# Patient Record
Sex: FEMALE | Race: WHITE | Employment: PART TIME | URBAN - METROPOLITAN AREA
[De-identification: names, ages, dates, MRNs, and addresses within clinical notes are randomized per-mention and may not be internally consistent; named-entity substitution may affect disease eponyms.]

---

## 2017-11-01 PROBLEM — E04.9 THYROID ENLARGEMENT: Status: ACTIVE | Noted: 2017-11-01

## 2018-08-23 ENCOUNTER — TELEPHONE (OUTPATIENT)
Dept: INTERNAL MEDICINE | Age: 23
End: 2018-08-23
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
## 2018-10-02 ENCOUNTER — HOSPITAL ENCOUNTER (EMERGENCY)
Age: 23
Discharge: HOME | End: 2018-10-02

## 2018-10-02 VITALS
DIASTOLIC BLOOD PRESSURE: 75 MMHG | TEMPERATURE: 97.2 F | BODY MASS INDEX: 39.93 KG/M2 | HEIGHT: 62 IN | WEIGHT: 217 LBS | RESPIRATION RATE: 16 BRPM | HEART RATE: 91 BPM | OXYGEN SATURATION: 98 % | SYSTOLIC BLOOD PRESSURE: 145 MMHG

## 2018-10-02 DIAGNOSIS — T14.8XXA MUSCLE STRAIN: ICD-10-CM

## 2018-10-02 DIAGNOSIS — M79.604 RIGHT LEG PAIN: Primary | ICD-10-CM

## 2018-10-02 PROCEDURE — 6370000000 HC RX 637 (ALT 250 FOR IP): Performed by: NURSE PRACTITIONER

## 2018-10-02 PROCEDURE — 99282 EMERGENCY DEPT VISIT SF MDM: CPT

## 2018-10-02 RX ORDER — CYCLOBENZAPRINE HCL 10 MG
10 TABLET ORAL ONCE
Status: COMPLETED | OUTPATIENT
Start: 2018-10-02 | End: 2018-10-02

## 2018-10-02 RX ORDER — ORPHENADRINE CITRATE 100 MG/1
100 TABLET, EXTENDED RELEASE ORAL 2 TIMES DAILY
Qty: 20 TABLET | Refills: 0 | Status: SHIPPED | OUTPATIENT
Start: 2018-10-02 | End: 2018-10-12

## 2018-10-02 RX ORDER — NAPROXEN 500 MG/1
500 TABLET ORAL 2 TIMES DAILY PRN
Qty: 28 TABLET | Refills: 0 | Status: SHIPPED | OUTPATIENT
Start: 2018-10-02 | End: 2019-01-06

## 2018-10-02 RX ORDER — OXYCODONE HYDROCHLORIDE AND ACETAMINOPHEN 5; 325 MG/1; MG/1
1 TABLET ORAL ONCE
Status: COMPLETED | OUTPATIENT
Start: 2018-10-02 | End: 2018-10-02

## 2018-10-02 RX ADMIN — OXYCODONE AND ACETAMINOPHEN 1 TABLET: 5; 325 TABLET ORAL at 19:27

## 2018-10-02 RX ADMIN — CYCLOBENZAPRINE HYDROCHLORIDE 10 MG: 10 TABLET, FILM COATED ORAL at 19:25

## 2018-10-02 ASSESSMENT — PAIN SCALES - GENERAL
PAINLEVEL_OUTOF10: 6
PAINLEVEL_OUTOF10: 5

## 2018-10-02 ASSESSMENT — PAIN DESCRIPTION - DESCRIPTORS: DESCRIPTORS: ACHING

## 2018-10-02 ASSESSMENT — PAIN DESCRIPTION - PAIN TYPE: TYPE: ACUTE PAIN

## 2018-10-02 ASSESSMENT — PAIN DESCRIPTION - LOCATION: LOCATION: LEG

## 2018-10-02 ASSESSMENT — PAIN DESCRIPTION - ORIENTATION: ORIENTATION: RIGHT

## 2018-10-09 ENCOUNTER — HOSPITAL ENCOUNTER (EMERGENCY)
Age: 23
Discharge: HOME | End: 2018-10-09

## 2018-10-09 VITALS
WEIGHT: 217 LBS | SYSTOLIC BLOOD PRESSURE: 159 MMHG | RESPIRATION RATE: 16 BRPM | BODY MASS INDEX: 39.93 KG/M2 | DIASTOLIC BLOOD PRESSURE: 97 MMHG | HEIGHT: 62 IN | HEART RATE: 122 BPM | OXYGEN SATURATION: 98 % | TEMPERATURE: 98.6 F

## 2018-10-09 DIAGNOSIS — J06.9 ACUTE UPPER RESPIRATORY INFECTION: ICD-10-CM

## 2018-10-09 DIAGNOSIS — R11.10 POST-TUSSIVE EMESIS: ICD-10-CM

## 2018-10-09 DIAGNOSIS — J02.9 ACUTE PHARYNGITIS, UNSPECIFIED ETIOLOGY: Primary | ICD-10-CM

## 2018-10-09 LAB — STREP GRP A PCR: NEGATIVE

## 2018-10-09 PROCEDURE — 6370000000 HC RX 637 (ALT 250 FOR IP): Performed by: NURSE PRACTITIONER

## 2018-10-09 PROCEDURE — 87880 STREP A ASSAY W/OPTIC: CPT

## 2018-10-09 PROCEDURE — 99282 EMERGENCY DEPT VISIT SF MDM: CPT

## 2018-10-09 PROCEDURE — 6360000002 HC RX W HCPCS: Performed by: NURSE PRACTITIONER

## 2018-10-09 RX ORDER — IBUPROFEN 800 MG/1
800 TABLET ORAL EVERY 6 HOURS PRN
Qty: 16 TABLET | Refills: 0 | Status: SHIPPED | OUTPATIENT
Start: 2018-10-09 | End: 2019-01-06

## 2018-10-09 RX ORDER — AZITHROMYCIN 250 MG/1
TABLET, FILM COATED ORAL
Qty: 1 PACKET | Refills: 0 | Status: SHIPPED | OUTPATIENT
Start: 2018-10-09 | End: 2018-10-19

## 2018-10-09 RX ORDER — ONDANSETRON 4 MG/1
4 TABLET, ORALLY DISINTEGRATING ORAL ONCE
Status: COMPLETED | OUTPATIENT
Start: 2018-10-09 | End: 2018-10-09

## 2018-10-09 RX ORDER — AZITHROMYCIN 250 MG/1
TABLET, FILM COATED ORAL
Qty: 1 PACKET | Refills: 0 | Status: SHIPPED | OUTPATIENT
Start: 2018-10-09 | End: 2018-10-09

## 2018-10-09 RX ORDER — IBUPROFEN 800 MG/1
800 TABLET ORAL EVERY 6 HOURS PRN
Qty: 16 TABLET | Refills: 0 | Status: SHIPPED | OUTPATIENT
Start: 2018-10-09 | End: 2018-10-09

## 2018-10-09 RX ORDER — ONDANSETRON 4 MG/1
4 TABLET, ORALLY DISINTEGRATING ORAL EVERY 8 HOURS PRN
Qty: 10 TABLET | Refills: 0 | Status: SHIPPED | OUTPATIENT
Start: 2018-10-09 | End: 2019-01-06

## 2018-10-09 RX ORDER — ONDANSETRON 4 MG/1
4 TABLET, ORALLY DISINTEGRATING ORAL EVERY 8 HOURS PRN
Qty: 10 TABLET | Refills: 0 | Status: SHIPPED | OUTPATIENT
Start: 2018-10-09 | End: 2018-10-09

## 2018-10-09 RX ADMIN — ONDANSETRON 4 MG: 4 TABLET, ORALLY DISINTEGRATING ORAL at 14:16

## 2018-10-09 RX ADMIN — IBUPROFEN 800 MG: 200 SUSPENSION ORAL at 14:15

## 2018-10-09 ASSESSMENT — PAIN DESCRIPTION - PROGRESSION: CLINICAL_PROGRESSION: GRADUALLY WORSENING

## 2018-10-09 ASSESSMENT — PAIN DESCRIPTION - LOCATION: LOCATION: THROAT

## 2018-10-09 ASSESSMENT — PAIN DESCRIPTION - PAIN TYPE: TYPE: ACUTE PAIN

## 2018-10-09 ASSESSMENT — PAIN SCALES - GENERAL: PAINLEVEL_OUTOF10: 9

## 2018-10-09 ASSESSMENT — PAIN DESCRIPTION - FREQUENCY: FREQUENCY: CONTINUOUS

## 2018-10-09 ASSESSMENT — PAIN DESCRIPTION - DESCRIPTORS: DESCRIPTORS: ACHING

## 2019-01-06 ENCOUNTER — HOSPITAL ENCOUNTER (EMERGENCY)
Age: 24
Discharge: HOME | End: 2019-01-06
Payer: COMMERCIAL

## 2019-01-06 VITALS
DIASTOLIC BLOOD PRESSURE: 71 MMHG | WEIGHT: 200 LBS | RESPIRATION RATE: 14 BRPM | HEART RATE: 95 BPM | OXYGEN SATURATION: 98 % | BODY MASS INDEX: 36.8 KG/M2 | SYSTOLIC BLOOD PRESSURE: 119 MMHG | TEMPERATURE: 98.2 F | HEIGHT: 62 IN

## 2019-01-06 DIAGNOSIS — N39.0 URINARY TRACT INFECTION WITHOUT HEMATURIA, SITE UNSPECIFIED: ICD-10-CM

## 2019-01-06 DIAGNOSIS — Z32.01 PREGNANCY TEST POSITIVE: Primary | ICD-10-CM

## 2019-01-06 LAB
BACTERIA: ABNORMAL /HPF
BILIRUBIN URINE: NEGATIVE
BLOOD, URINE: NEGATIVE
CLARITY: ABNORMAL
COLOR: YELLOW
EPITHELIAL CELLS, UA: ABNORMAL /HPF
GLUCOSE URINE: NEGATIVE MG/DL
HCG(URINE) PREGNANCY TEST: POSITIVE
KETONES, URINE: NEGATIVE MG/DL
LEUKOCYTE ESTERASE, URINE: ABNORMAL
NITRITE, URINE: NEGATIVE
PH UA: 6.5 (ref 5–9)
PROTEIN UA: NEGATIVE MG/DL
RBC UA: ABNORMAL /HPF (ref 0–2)
SPECIFIC GRAVITY UA: 1.02 (ref 1–1.03)
UROBILINOGEN, URINE: 0.2 E.U./DL
WBC UA: >20 /HPF (ref 0–5)

## 2019-01-06 PROCEDURE — 6370000000 HC RX 637 (ALT 250 FOR IP): Performed by: PHYSICIAN ASSISTANT

## 2019-01-06 PROCEDURE — 81025 URINE PREGNANCY TEST: CPT

## 2019-01-06 PROCEDURE — 81001 URINALYSIS AUTO W/SCOPE: CPT

## 2019-01-06 PROCEDURE — 99284 EMERGENCY DEPT VISIT MOD MDM: CPT

## 2019-01-06 RX ORDER — CEPHALEXIN 500 MG/1
500 CAPSULE ORAL ONCE
Status: COMPLETED | OUTPATIENT
Start: 2019-01-06 | End: 2019-01-06

## 2019-01-06 RX ORDER — CEPHALEXIN 500 MG/1
500 CAPSULE ORAL 3 TIMES DAILY
Qty: 30 CAPSULE | Refills: 0 | Status: SHIPPED | OUTPATIENT
Start: 2019-01-06 | End: 2019-01-16

## 2019-01-06 RX ADMIN — CEPHALEXIN 500 MG: 500 CAPSULE ORAL at 18:41

## 2019-01-06 ASSESSMENT — PAIN SCALES - GENERAL: PAINLEVEL_OUTOF10: 4

## 2019-01-06 ASSESSMENT — PAIN DESCRIPTION - LOCATION: LOCATION: ABDOMEN

## 2019-01-06 ASSESSMENT — PAIN DESCRIPTION - PAIN TYPE: TYPE: ACUTE PAIN

## 2019-01-06 ASSESSMENT — PAIN DESCRIPTION - DESCRIPTORS: DESCRIPTORS: ACHING

## 2019-01-06 ASSESSMENT — PAIN DESCRIPTION - FREQUENCY: FREQUENCY: CONTINUOUS

## 2019-02-04 ENCOUNTER — HOSPITAL ENCOUNTER (OUTPATIENT)
Age: 24
Discharge: HOME | End: 2019-02-06
Payer: COMMERCIAL

## 2019-02-04 ENCOUNTER — INITIAL PRENATAL (OUTPATIENT)
Dept: OBGYN | Age: 24
End: 2019-02-04
Payer: COMMERCIAL

## 2019-02-04 VITALS
DIASTOLIC BLOOD PRESSURE: 72 MMHG | SYSTOLIC BLOOD PRESSURE: 121 MMHG | HEART RATE: 103 BPM | WEIGHT: 208 LBS | BODY MASS INDEX: 38.04 KG/M2

## 2019-02-04 DIAGNOSIS — Z34.91 PRENATAL CARE IN FIRST TRIMESTER: ICD-10-CM

## 2019-02-04 DIAGNOSIS — Z34.91 PRENATAL CARE IN FIRST TRIMESTER: Primary | ICD-10-CM

## 2019-02-04 LAB
AMPHETAMINE SCREEN, URINE: NOT DETECTED
BARBITURATE SCREEN URINE: NOT DETECTED
BENZODIAZEPINE SCREEN, URINE: NOT DETECTED
CANNABINOID SCREEN URINE: NOT DETECTED
COCAINE METABOLITE SCREEN URINE: NOT DETECTED
CONTROL: NORMAL
HCT VFR BLD CALC: 34.7 % (ref 34–48)
HEMOGLOBIN: 11.1 G/DL (ref 11.5–15.5)
MCH RBC QN AUTO: 28.3 PG (ref 26–35)
MCHC RBC AUTO-ENTMCNC: 32 % (ref 32–34.5)
MCV RBC AUTO: 88.5 FL (ref 80–99.9)
METHADONE SCREEN, URINE: NOT DETECTED
OPIATE SCREEN URINE: NOT DETECTED
PDW BLD-RTO: 12.8 FL (ref 11.5–15)
PHENCYCLIDINE SCREEN URINE: NOT DETECTED
PLATELET # BLD: 83 E9/L (ref 130–450)
PLATELET CONFIRMATION: NORMAL
PMV BLD AUTO: 12.5 FL (ref 7–12)
PREGNANCY TEST URINE, POC: NORMAL
PROPOXYPHENE SCREEN: NOT DETECTED
RBC # BLD: 3.92 E12/L (ref 3.5–5.5)
WBC # BLD: 6.1 E9/L (ref 4.5–11.5)

## 2019-02-04 PROCEDURE — 86592 SYPHILIS TEST NON-TREP QUAL: CPT

## 2019-02-04 PROCEDURE — 83020 HEMOGLOBIN ELECTROPHORESIS: CPT

## 2019-02-04 PROCEDURE — 81220 CFTR GENE COM VARIANTS: CPT

## 2019-02-04 PROCEDURE — 99203 OFFICE O/P NEW LOW 30 MIN: CPT | Performed by: NURSE PRACTITIONER

## 2019-02-04 PROCEDURE — 86901 BLOOD TYPING SEROLOGIC RH(D): CPT

## 2019-02-04 PROCEDURE — 86850 RBC ANTIBODY SCREEN: CPT

## 2019-02-04 PROCEDURE — 87088 URINE BACTERIA CULTURE: CPT

## 2019-02-04 PROCEDURE — 36415 COLL VENOUS BLD VENIPUNCTURE: CPT | Performed by: NURSE PRACTITIONER

## 2019-02-04 PROCEDURE — 83021 HEMOGLOBIN CHROMOTOGRAPHY: CPT

## 2019-02-04 PROCEDURE — 80307 DRUG TEST PRSMV CHEM ANLYZR: CPT

## 2019-02-04 PROCEDURE — 85027 COMPLETE CBC AUTOMATED: CPT

## 2019-02-04 PROCEDURE — G0123 SCREEN CERV/VAG THIN LAYER: HCPCS

## 2019-02-04 PROCEDURE — 86762 RUBELLA ANTIBODY: CPT

## 2019-02-04 PROCEDURE — 86703 HIV-1/HIV-2 1 RESULT ANTBDY: CPT

## 2019-02-04 PROCEDURE — 81025 URINE PREGNANCY TEST: CPT | Performed by: NURSE PRACTITIONER

## 2019-02-04 PROCEDURE — 86900 BLOOD TYPING SEROLOGIC ABO: CPT

## 2019-02-04 PROCEDURE — 87340 HEPATITIS B SURFACE AG IA: CPT

## 2019-02-04 PROCEDURE — 36415 COLL VENOUS BLD VENIPUNCTURE: CPT

## 2019-02-04 PROCEDURE — 87624 HPV HI-RISK TYP POOLED RSLT: CPT

## 2019-02-05 LAB
ABO/RH: NORMAL
ANTIBODY SCREEN: NORMAL
HEPATITIS B SURFACE ANTIGEN INTERPRETATION: NORMAL
HIV-1 AND HIV-2 ANTIBODIES: NORMAL
RPR: NORMAL
RUBELLA ANTIBODY IGG: NORMAL
URINE CULTURE, ROUTINE: NORMAL

## 2019-02-05 PROCEDURE — 87491 CHLMYD TRACH DNA AMP PROBE: CPT

## 2019-02-05 PROCEDURE — 87591 N.GONORRHOEAE DNA AMP PROB: CPT

## 2019-02-07 ENCOUNTER — HOSPITAL ENCOUNTER (OUTPATIENT)
Dept: ULTRASOUND IMAGING | Age: 24
Discharge: HOME | End: 2019-02-09
Payer: COMMERCIAL

## 2019-02-07 DIAGNOSIS — Z34.90 PREGNANCY, UNSPECIFIED GESTATIONAL AGE: ICD-10-CM

## 2019-02-07 DIAGNOSIS — Z34.90 PREGNANCY, UNSPECIFIED GESTATIONAL AGE: Primary | ICD-10-CM

## 2019-02-07 DIAGNOSIS — Z34.91 PRENATAL CARE IN FIRST TRIMESTER: ICD-10-CM

## 2019-02-07 LAB
N GONORRHOEAE AMPLIFIED DET: ABNORMAL
ORGANISM: ABNORMAL

## 2019-02-07 PROCEDURE — 76805 OB US >/= 14 WKS SNGL FETUS: CPT

## 2019-02-07 PROCEDURE — 76817 TRANSVAGINAL US OBSTETRIC: CPT

## 2019-02-11 PROBLEM — R87.612 LOW GRADE SQUAMOUS INTRAEPITHELIAL LESION (LGSIL) AT RISK FOR HIGH GRADE SQUAMOUS INTRAEPITHELIAL LESION (HGSIL) ON CYTOLOGIC SMEAR OF CERVIX: Status: ACTIVE | Noted: 2019-02-11

## 2019-02-12 ENCOUNTER — ROUTINE PRENATAL (OUTPATIENT)
Dept: OBGYN | Age: 24
End: 2019-02-12
Payer: COMMERCIAL

## 2019-02-12 VITALS
HEART RATE: 97 BPM | WEIGHT: 211 LBS | DIASTOLIC BLOOD PRESSURE: 74 MMHG | SYSTOLIC BLOOD PRESSURE: 124 MMHG | BODY MASS INDEX: 38.59 KG/M2

## 2019-02-12 DIAGNOSIS — Z34.92 PRENATAL CARE IN SECOND TRIMESTER: ICD-10-CM

## 2019-02-12 DIAGNOSIS — A64 STD (FEMALE): Primary | ICD-10-CM

## 2019-02-12 LAB
CYSTIC FIBROSIS 165 VARIANTS INTERP: NORMAL
CYSTIC FIBROSIS 5T VARIANT: NORMAL
CYSTIC FIBROSIS ALLELE 1: NEGATIVE
CYSTIC FIBROSIS ALLELE 2: NEGATIVE
GLUCOSE URINE, POC: NORMAL
PROTEIN UA: NEGATIVE

## 2019-02-12 PROCEDURE — 81002 URINALYSIS NONAUTO W/O SCOPE: CPT | Performed by: NURSE PRACTITIONER

## 2019-02-12 PROCEDURE — 99213 OFFICE O/P EST LOW 20 MIN: CPT | Performed by: NURSE PRACTITIONER

## 2019-02-12 PROCEDURE — 6370000000 HC RX 637 (ALT 250 FOR IP)

## 2019-02-12 RX ORDER — AZITHROMYCIN 250 MG/1
1000 TABLET, FILM COATED ORAL ONCE
Status: COMPLETED | OUTPATIENT
Start: 2019-02-12 | End: 2019-02-12

## 2019-02-12 RX ADMIN — AZITHROMYCIN 1000 MG: 250 TABLET, FILM COATED ORAL at 10:00

## 2019-02-13 LAB
Lab: NORMAL
REPORT: NORMAL
THIS TEST SENT TO: NORMAL

## 2019-02-14 LAB
CORRESPONDING PAP CASE #: NORMAL
HPV, HIGH RISK: POSITIVE

## 2019-02-16 ENCOUNTER — HOSPITAL ENCOUNTER (EMERGENCY)
Age: 24
Discharge: HOME | End: 2019-02-16
Attending: EMERGENCY MEDICINE
Payer: COMMERCIAL

## 2019-02-16 ENCOUNTER — APPOINTMENT (OUTPATIENT)
Dept: ULTRASOUND IMAGING | Age: 24
End: 2019-02-16
Payer: COMMERCIAL

## 2019-02-16 VITALS
OXYGEN SATURATION: 94 % | SYSTOLIC BLOOD PRESSURE: 103 MMHG | WEIGHT: 200 LBS | HEIGHT: 62 IN | HEART RATE: 95 BPM | RESPIRATION RATE: 16 BRPM | TEMPERATURE: 98.5 F | DIASTOLIC BLOOD PRESSURE: 51 MMHG | BODY MASS INDEX: 36.8 KG/M2

## 2019-02-16 DIAGNOSIS — R30.0 DYSURIA: ICD-10-CM

## 2019-02-16 DIAGNOSIS — O23.42 URINARY TRACT INFECTION IN MOTHER DURING SECOND TRIMESTER OF PREGNANCY: Primary | ICD-10-CM

## 2019-02-16 DIAGNOSIS — R10.9 BILATERAL FLANK PAIN: ICD-10-CM

## 2019-02-16 LAB
ALBUMIN SERPL-MCNC: 3.6 G/DL (ref 3.5–5.2)
ALP BLD-CCNC: 77 U/L (ref 35–104)
ALT SERPL-CCNC: 28 U/L (ref 0–32)
ANION GAP SERPL CALCULATED.3IONS-SCNC: 10 MMOL/L (ref 7–16)
AST SERPL-CCNC: 24 U/L (ref 0–31)
BACTERIA: ABNORMAL /HPF
BASOPHILS ABSOLUTE: 0.01 E9/L (ref 0–0.2)
BASOPHILS RELATIVE PERCENT: 0.1 % (ref 0–2)
BILIRUB SERPL-MCNC: 0.6 MG/DL (ref 0–1.2)
BILIRUBIN URINE: NEGATIVE
BLOOD, URINE: NEGATIVE
BUN BLDV-MCNC: 8 MG/DL (ref 6–20)
CALCIUM SERPL-MCNC: 8.9 MG/DL (ref 8.6–10.2)
CHLORIDE BLD-SCNC: 102 MMOL/L (ref 98–107)
CLARITY: CLEAR
CO2: 22 MMOL/L (ref 22–29)
COLOR: YELLOW
CREAT SERPL-MCNC: 0.4 MG/DL (ref 0.5–1)
EOSINOPHILS ABSOLUTE: 0.05 E9/L (ref 0.05–0.5)
EOSINOPHILS RELATIVE PERCENT: 0.5 % (ref 0–6)
EPITHELIAL CELLS, UA: ABNORMAL /HPF
GFR AFRICAN AMERICAN: >60
GFR NON-AFRICAN AMERICAN: >60 ML/MIN/1.73
GLUCOSE BLD-MCNC: 114 MG/DL (ref 74–99)
GLUCOSE URINE: NEGATIVE MG/DL
HCT VFR BLD CALC: 33.2 % (ref 34–48)
HEMOGLOBIN: 11.1 G/DL (ref 11.5–15.5)
IMMATURE GRANULOCYTES #: 0.02 E9/L
IMMATURE GRANULOCYTES %: 0.2 % (ref 0–5)
KETONES, URINE: NEGATIVE MG/DL
LACTIC ACID: 1.1 MMOL/L (ref 0.5–2.2)
LEUKOCYTE ESTERASE, URINE: ABNORMAL
LYMPHOCYTES ABSOLUTE: 2.39 E9/L (ref 1.5–4)
LYMPHOCYTES RELATIVE PERCENT: 26.3 % (ref 20–42)
MCH RBC QN AUTO: 29.3 PG (ref 26–35)
MCHC RBC AUTO-ENTMCNC: 33.4 % (ref 32–34.5)
MCV RBC AUTO: 87.6 FL (ref 80–99.9)
MONOCYTES ABSOLUTE: 0.65 E9/L (ref 0.1–0.95)
MONOCYTES RELATIVE PERCENT: 7.1 % (ref 2–12)
NEUTROPHILS ABSOLUTE: 5.98 E9/L (ref 1.8–7.3)
NEUTROPHILS RELATIVE PERCENT: 65.8 % (ref 43–80)
NITRITE, URINE: NEGATIVE
PDW BLD-RTO: 12.7 FL (ref 11.5–15)
PH UA: 7.5 (ref 5–9)
PLATELET # BLD: 175 E9/L (ref 130–450)
PMV BLD AUTO: 11.7 FL (ref 7–12)
POTASSIUM SERPL-SCNC: 4.1 MMOL/L (ref 3.5–5)
PROTEIN UA: NEGATIVE MG/DL
RBC # BLD: 3.79 E12/L (ref 3.5–5.5)
RBC UA: ABNORMAL /HPF (ref 0–2)
SODIUM BLD-SCNC: 134 MMOL/L (ref 132–146)
SPECIFIC GRAVITY UA: 1.01 (ref 1–1.03)
TOTAL PROTEIN: 6.6 G/DL (ref 6.4–8.3)
UROBILINOGEN, URINE: 1 E.U./DL
WBC # BLD: 9.1 E9/L (ref 4.5–11.5)
WBC UA: ABNORMAL /HPF (ref 0–5)

## 2019-02-16 PROCEDURE — 6360000002 HC RX W HCPCS: Performed by: NURSE PRACTITIONER

## 2019-02-16 PROCEDURE — 99283 EMERGENCY DEPT VISIT LOW MDM: CPT

## 2019-02-16 PROCEDURE — 96375 TX/PRO/DX INJ NEW DRUG ADDON: CPT

## 2019-02-16 PROCEDURE — 87040 BLOOD CULTURE FOR BACTERIA: CPT

## 2019-02-16 PROCEDURE — 96365 THER/PROPH/DIAG IV INF INIT: CPT

## 2019-02-16 PROCEDURE — 81001 URINALYSIS AUTO W/SCOPE: CPT

## 2019-02-16 PROCEDURE — 85025 COMPLETE CBC W/AUTO DIFF WBC: CPT

## 2019-02-16 PROCEDURE — 87088 URINE BACTERIA CULTURE: CPT

## 2019-02-16 PROCEDURE — 2580000003 HC RX 258: Performed by: NURSE PRACTITIONER

## 2019-02-16 PROCEDURE — 83605 ASSAY OF LACTIC ACID: CPT

## 2019-02-16 PROCEDURE — 76775 US EXAM ABDO BACK WALL LIM: CPT

## 2019-02-16 PROCEDURE — 80053 COMPREHEN METABOLIC PANEL: CPT

## 2019-02-16 RX ORDER — METOCLOPRAMIDE HYDROCHLORIDE 5 MG/ML
10 INJECTION INTRAMUSCULAR; INTRAVENOUS ONCE
Status: COMPLETED | OUTPATIENT
Start: 2019-02-16 | End: 2019-02-16

## 2019-02-16 RX ORDER — 0.9 % SODIUM CHLORIDE 0.9 %
30 INTRAVENOUS SOLUTION INTRAVENOUS ONCE
Status: COMPLETED | OUTPATIENT
Start: 2019-02-16 | End: 2019-02-16

## 2019-02-16 RX ORDER — CEPHALEXIN 500 MG/1
500 CAPSULE ORAL 3 TIMES DAILY
Qty: 21 CAPSULE | Refills: 0 | Status: SHIPPED | OUTPATIENT
Start: 2019-02-16 | End: 2019-02-23

## 2019-02-16 RX ORDER — ACETAMINOPHEN 500 MG
500 TABLET ORAL EVERY 6 HOURS PRN
Qty: 20 TABLET | Refills: 0 | Status: SHIPPED | OUTPATIENT
Start: 2019-02-16 | End: 2019-03-14

## 2019-02-16 RX ADMIN — SODIUM CHLORIDE 2721 ML: 9 INJECTION, SOLUTION INTRAVENOUS at 17:55

## 2019-02-16 RX ADMIN — METOCLOPRAMIDE 10 MG: 5 INJECTION, SOLUTION INTRAMUSCULAR; INTRAVENOUS at 18:27

## 2019-02-16 RX ADMIN — CEFTRIAXONE 1 G: 1 INJECTION, POWDER, FOR SOLUTION INTRAMUSCULAR; INTRAVENOUS at 18:31

## 2019-02-16 ASSESSMENT — PAIN SCALES - GENERAL: PAINLEVEL_OUTOF10: 7

## 2019-02-18 LAB — URINE CULTURE, ROUTINE: NORMAL

## 2019-02-21 LAB
BLOOD CULTURE, ROUTINE: NORMAL
CULTURE, BLOOD 2: NORMAL

## 2019-03-04 ENCOUNTER — ROUTINE PRENATAL (OUTPATIENT)
Dept: OBGYN | Age: 24
End: 2019-03-04
Payer: COMMERCIAL

## 2019-03-04 VITALS
TEMPERATURE: 97.9 F | SYSTOLIC BLOOD PRESSURE: 115 MMHG | WEIGHT: 209 LBS | BODY MASS INDEX: 38.46 KG/M2 | DIASTOLIC BLOOD PRESSURE: 70 MMHG | HEIGHT: 62 IN | HEART RATE: 100 BPM | RESPIRATION RATE: 14 BRPM

## 2019-03-04 DIAGNOSIS — Z34.92 PRENATAL CARE IN SECOND TRIMESTER: Primary | ICD-10-CM

## 2019-03-04 LAB
GLUCOSE URINE, POC: NEGATIVE
PROTEIN UA: NEGATIVE

## 2019-03-04 PROCEDURE — 81002 URINALYSIS NONAUTO W/O SCOPE: CPT | Performed by: NURSE PRACTITIONER

## 2019-03-04 PROCEDURE — 99212 OFFICE O/P EST SF 10 MIN: CPT | Performed by: NURSE PRACTITIONER

## 2019-03-14 ENCOUNTER — ROUTINE PRENATAL (OUTPATIENT)
Dept: OBGYN CLINIC | Age: 24
End: 2019-03-14
Payer: COMMERCIAL

## 2019-03-14 VITALS
DIASTOLIC BLOOD PRESSURE: 70 MMHG | HEIGHT: 62 IN | WEIGHT: 207 LBS | BODY MASS INDEX: 38.09 KG/M2 | SYSTOLIC BLOOD PRESSURE: 116 MMHG | HEART RATE: 81 BPM

## 2019-03-14 DIAGNOSIS — O44.42 LOW LYING PLACENTA NOS OR WITHOUT HEMORRHAGE, SECOND TRIMESTER: ICD-10-CM

## 2019-03-14 DIAGNOSIS — Z03.75 SUSPECTED SHORTENING OF CERVIX NOT FOUND: ICD-10-CM

## 2019-03-14 DIAGNOSIS — O99.212 OBESITY AFFECTING PREGNANCY IN SECOND TRIMESTER: Primary | ICD-10-CM

## 2019-03-14 DIAGNOSIS — Z36.89 ENCOUNTER FOR FETAL ANATOMIC SURVEY: ICD-10-CM

## 2019-03-14 DIAGNOSIS — O98.812 CHLAMYDIA INFECTION COMPLICATING PREGNANCY, SECOND TRIMESTER: ICD-10-CM

## 2019-03-14 DIAGNOSIS — Z13.79 ENCOUNTER FOR OTHER SCREENING FOR GENETIC AND CHROMOSOMAL ANOMALIES: ICD-10-CM

## 2019-03-14 DIAGNOSIS — Z3A.20 20 WEEKS GESTATION OF PREGNANCY: ICD-10-CM

## 2019-03-14 DIAGNOSIS — A74.9 CHLAMYDIA INFECTION COMPLICATING PREGNANCY, SECOND TRIMESTER: ICD-10-CM

## 2019-03-14 LAB
GLUCOSE URINE, POC: NORMAL
PROTEIN UA: NEGATIVE

## 2019-03-14 PROCEDURE — 81002 URINALYSIS NONAUTO W/O SCOPE: CPT | Performed by: OBSTETRICS & GYNECOLOGY

## 2019-03-14 PROCEDURE — 99201 HC NEW PT, E/M LEVEL 1: CPT | Performed by: OBSTETRICS & GYNECOLOGY

## 2019-03-14 PROCEDURE — 76817 TRANSVAGINAL US OBSTETRIC: CPT | Performed by: OBSTETRICS & GYNECOLOGY

## 2019-03-14 PROCEDURE — G8427 DOCREV CUR MEDS BY ELIG CLIN: HCPCS | Performed by: OBSTETRICS & GYNECOLOGY

## 2019-03-14 PROCEDURE — G8417 CALC BMI ABV UP PARAM F/U: HCPCS | Performed by: OBSTETRICS & GYNECOLOGY

## 2019-03-14 PROCEDURE — 99213 OFFICE O/P EST LOW 20 MIN: CPT | Performed by: OBSTETRICS & GYNECOLOGY

## 2019-03-14 PROCEDURE — 1036F TOBACCO NON-USER: CPT | Performed by: OBSTETRICS & GYNECOLOGY

## 2019-03-14 PROCEDURE — G8484 FLU IMMUNIZE NO ADMIN: HCPCS | Performed by: OBSTETRICS & GYNECOLOGY

## 2019-03-14 PROCEDURE — 76811 OB US DETAILED SNGL FETUS: CPT | Performed by: OBSTETRICS & GYNECOLOGY

## 2019-03-14 PROCEDURE — 36415 COLL VENOUS BLD VENIPUNCTURE: CPT

## 2019-03-25 ENCOUNTER — TELEPHONE (OUTPATIENT)
Dept: OBGYN CLINIC | Age: 24
End: 2019-03-25

## 2019-04-05 ENCOUNTER — TELEPHONE (OUTPATIENT)
Dept: OBGYN | Age: 24
End: 2019-04-05

## 2019-04-08 ENCOUNTER — ROUTINE PRENATAL (OUTPATIENT)
Dept: OBGYN | Age: 24
End: 2019-04-08
Payer: COMMERCIAL

## 2019-04-08 VITALS
BODY MASS INDEX: 38.96 KG/M2 | HEART RATE: 121 BPM | WEIGHT: 213 LBS | DIASTOLIC BLOOD PRESSURE: 75 MMHG | SYSTOLIC BLOOD PRESSURE: 111 MMHG

## 2019-04-08 DIAGNOSIS — O99.019 ANEMIA DURING PREGNANCY: ICD-10-CM

## 2019-04-08 DIAGNOSIS — Z34.92 PRENATAL CARE IN SECOND TRIMESTER: Primary | ICD-10-CM

## 2019-04-08 LAB
GLUCOSE URINE, POC: NEGATIVE
PROTEIN UA: ABNORMAL

## 2019-04-08 PROCEDURE — 81002 URINALYSIS NONAUTO W/O SCOPE: CPT | Performed by: NURSE PRACTITIONER

## 2019-04-08 PROCEDURE — 99212 OFFICE O/P EST SF 10 MIN: CPT | Performed by: NURSE PRACTITIONER

## 2019-04-08 RX ORDER — FERROUS SULFATE 325(65) MG
325 TABLET ORAL
Qty: 30 TABLET | Refills: 5 | Status: SHIPPED | OUTPATIENT
Start: 2019-04-08 | End: 2019-08-30 | Stop reason: ALTCHOICE

## 2019-04-08 NOTE — PROGRESS NOTES
Pt attended CenteringPregnancy session 1. Topics discussed included harmful things in pregnancy, pregnancy pains including morning sickness. PT scored a 0 on the [place]. Made Provider, Nurse, and LISW aware of score. PT said this was better than expected and happy to be at [facility]. Pt denied any needs or concerns at present. Encouraged to contact this worker as needed.

## 2019-04-08 NOTE — PROGRESS NOTES
Centering Pregnancy Session    CC: Centering Pregnancy Visit    George Lagunas is a 21 y.o. female who presents today for a prenatal visit at 19w6d with Centering Pregnancy Session 1.    OB History    Para Term  AB Living   1             SAB TAB Ectopic Molar Multiple Live Births                    # Outcome Date GA Lbr Chava/2nd Weight Sex Delivery Anes PTL Lv   1 Current                Subjective:    Patient is doing well. She denies   - vaginal bleeding  - gush of fluid  - regular uterine contractions  - abdominal pain    She states that fetal movement has been sufficient     Patient is taking prenatal vitamin. Complaints include: none    Other medications include:   Current Outpatient Medications   Medication Sig Dispense Refill    ferrous sulfate 325 (65 Fe) MG tablet Take 1 tablet by mouth daily (with breakfast) 30 tablet 5    Prenatal Multivit-Min-Fe-FA (PRE-RENEE PO) Take by mouth daily       No current facility-administered medications for this visit. Objective: Well appearing, friendly  Fetal Heart Rate: 160 pbm  Fundal Height: 23 cm      Assessment:    1. George Lagunas is a 21 y.o. female  2.   3. 23w5d  4. Anemia in pregnancy: Hb 11.1 g/dL 19    Plan:    1. Centering Session 1 topics discussed included  - Nutrition during pregnancy  - Common discomforts of pregnancy  - Hazards during pregnancy  - Second trimester warning signs    2. Pregnancy in the second trimester  - POCT urine for glucose and protein     3. Anemia in pregnancy  - ferrous sulfate 325 mg daily       Patient advised to return for next centering pregnancy session in 2 weeks, earlier if needed. She was instructed to call if she has any increased vaginal discharge, vaginal bleeding, contractions, abdominal pain, back pain or any new significant symptoms prior to her next visit. Phone number given.      Lizett Treviño M.D.   PGY - 2   5:43 PM   19

## 2019-04-18 ENCOUNTER — TELEPHONE (OUTPATIENT)
Dept: OBGYN | Age: 24
End: 2019-04-18
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
## 2019-04-22 ENCOUNTER — ROUTINE PRENATAL (OUTPATIENT)
Dept: OBGYN | Age: 24
End: 2019-04-22
Payer: COMMERCIAL

## 2019-04-22 VITALS
HEART RATE: 116 BPM | SYSTOLIC BLOOD PRESSURE: 130 MMHG | BODY MASS INDEX: 39.51 KG/M2 | DIASTOLIC BLOOD PRESSURE: 81 MMHG | WEIGHT: 216 LBS

## 2019-04-22 DIAGNOSIS — O99.019 ANEMIA DURING PREGNANCY: ICD-10-CM

## 2019-04-22 DIAGNOSIS — Z34.92 PRENATAL CARE IN SECOND TRIMESTER: Primary | ICD-10-CM

## 2019-04-22 LAB
GLUCOSE URINE, POC: NEGATIVE
PROTEIN UA: NEGATIVE

## 2019-04-22 PROCEDURE — 81002 URINALYSIS NONAUTO W/O SCOPE: CPT | Performed by: NURSE PRACTITIONER

## 2019-04-22 PROCEDURE — 99212 OFFICE O/P EST SF 10 MIN: CPT | Performed by: NURSE PRACTITIONER
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
## 2019-05-06 ENCOUNTER — ROUTINE PRENATAL (OUTPATIENT)
Dept: OBGYN | Age: 24
End: 2019-05-06
Payer: COMMERCIAL

## 2019-05-06 ENCOUNTER — HOSPITAL ENCOUNTER (OUTPATIENT)
Age: 24
Discharge: HOME | End: 2019-05-08
Payer: COMMERCIAL

## 2019-05-06 VITALS
SYSTOLIC BLOOD PRESSURE: 131 MMHG | DIASTOLIC BLOOD PRESSURE: 86 MMHG | WEIGHT: 218 LBS | BODY MASS INDEX: 39.87 KG/M2 | HEART RATE: 120 BPM

## 2019-05-06 DIAGNOSIS — Z34.92 PRENATAL CARE IN SECOND TRIMESTER: ICD-10-CM

## 2019-05-06 DIAGNOSIS — O98.812: ICD-10-CM

## 2019-05-06 DIAGNOSIS — Z34.92 PRENATAL CARE IN SECOND TRIMESTER: Primary | ICD-10-CM

## 2019-05-06 DIAGNOSIS — A74.9: ICD-10-CM

## 2019-05-06 LAB
HCT VFR BLD CALC: 34.7 % (ref 34–48)
HEMOGLOBIN: 11.5 G/DL (ref 11.5–15.5)
MCH RBC QN AUTO: 29.2 PG (ref 26–35)
MCHC RBC AUTO-ENTMCNC: 33.1 % (ref 32–34.5)
MCV RBC AUTO: 88.1 FL (ref 80–99.9)
PDW BLD-RTO: 12.7 FL (ref 11.5–15)
PLATELET # BLD: 184 E9/L (ref 130–450)
PMV BLD AUTO: 11.5 FL (ref 7–12)
RBC # BLD: 3.94 E12/L (ref 3.5–5.5)
WBC # BLD: 7.2 E9/L (ref 4.5–11.5)

## 2019-05-06 PROCEDURE — 36415 COLL VENOUS BLD VENIPUNCTURE: CPT

## 2019-05-06 PROCEDURE — 36415 COLL VENOUS BLD VENIPUNCTURE: CPT | Performed by: NURSE PRACTITIONER

## 2019-05-06 PROCEDURE — 87591 N.GONORRHOEAE DNA AMP PROB: CPT

## 2019-05-06 PROCEDURE — 87491 CHLMYD TRACH DNA AMP PROBE: CPT

## 2019-05-06 PROCEDURE — 90715 TDAP VACCINE 7 YRS/> IM: CPT

## 2019-05-06 PROCEDURE — G8427 DOCREV CUR MEDS BY ELIG CLIN: HCPCS | Performed by: NURSE PRACTITIONER

## 2019-05-06 PROCEDURE — 99212 OFFICE O/P EST SF 10 MIN: CPT | Performed by: NURSE PRACTITIONER

## 2019-05-06 PROCEDURE — 85027 COMPLETE CBC AUTOMATED: CPT

## 2019-05-06 PROCEDURE — 1036F TOBACCO NON-USER: CPT | Performed by: NURSE PRACTITIONER

## 2019-05-06 PROCEDURE — 6360000002 HC RX W HCPCS

## 2019-05-06 PROCEDURE — 86850 RBC ANTIBODY SCREEN: CPT

## 2019-05-06 PROCEDURE — 90471 IMMUNIZATION ADMIN: CPT

## 2019-05-06 PROCEDURE — G8417 CALC BMI ABV UP PARAM F/U: HCPCS | Performed by: NURSE PRACTITIONER

## 2019-05-06 NOTE — PROGRESS NOTES
Centering Pregnancy Session    CC: Centering Pregnancy Visit    George Lagunas is a 21 y.o. female who presents today for a prenatal visit at 33w11d with Centering Pregnancy Session #3. OB History    Para Term  AB Living   1             SAB TAB Ectopic Molar Multiple Live Births                    # Outcome Date GA Lbr Chava/2nd Weight Sex Delivery Anes PTL Lv   1 Current                Subjective:    Patient is doing well. She denies   - vaginal bleeding  - gush of fluid  - regular uterine contractions  - abdominal pain    She states that fetal movement has been sufficient     Patient is taking prenatal vitamin. Complaints include: none    Other medications include:   Current Outpatient Medications   Medication Sig Dispense Refill    ferrous sulfate 325 (65 Fe) MG tablet Take 1 tablet by mouth daily (with breakfast) 30 tablet 5    Prenatal Multivit-Min-Fe-FA (PRE- PO) Take by mouth daily       No current facility-administered medications for this visit. Objective:    Patient appears well, friendly  Fetal Heart Rate: 140  Fundal Height: 30.5      Assessment:    1. George Lagunas is a 21 y.o. female  2.   3. 27w5d          Plan:    1. Centering Pregnancy Session #3 ; topics discussed included  - breastfeeding    2. Pregnancy in the 2nd trimester  - Tdap vaccine  - CBC, Reji, 1 h GTT, Gc/chlamydia cultures    Patient advised to return for next centering pregnancy session in 2 weeks, earlier if needed. She was instructed to call if she has any increased vaginal discharge, vaginal bleeding, contractions, abdominal pain, back pain or any new significant symptoms prior to her next visit. Phone number given.        Zuleima Arevalo M.D.   PGY - 2    5:55 PM   19

## 2019-05-07 LAB — ANTIBODY SCREEN: NORMAL
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
## 2019-05-10 LAB
CHLAMYDIA TRACHOMATIS AMPLIFIED DET: NORMAL
N GONORRHOEAE AMPLIFIED DET: NORMAL

## 2019-05-17 ENCOUNTER — TELEPHONE (OUTPATIENT)
Dept: OBGYN | Age: 24
End: 2019-05-17

## 2019-05-20 ENCOUNTER — ROUTINE PRENATAL (OUTPATIENT)
Dept: OBGYN | Age: 24
End: 2019-05-20
Payer: COMMERCIAL

## 2019-05-20 DIAGNOSIS — Z34.93 PRENATAL CARE IN THIRD TRIMESTER: Primary | ICD-10-CM

## 2019-05-20 DIAGNOSIS — O99.019 ANEMIA DURING PREGNANCY: ICD-10-CM

## 2019-05-20 LAB
GLUCOSE URINE, POC: NEGATIVE
PROTEIN UA: ABNORMAL

## 2019-05-20 PROCEDURE — G8417 CALC BMI ABV UP PARAM F/U: HCPCS | Performed by: NURSE PRACTITIONER

## 2019-05-20 PROCEDURE — 99212 OFFICE O/P EST SF 10 MIN: CPT | Performed by: NURSE PRACTITIONER

## 2019-05-20 PROCEDURE — G8427 DOCREV CUR MEDS BY ELIG CLIN: HCPCS | Performed by: NURSE PRACTITIONER

## 2019-05-20 PROCEDURE — 81002 URINALYSIS NONAUTO W/O SCOPE: CPT | Performed by: NURSE PRACTITIONER

## 2019-05-20 PROCEDURE — 1036F TOBACCO NON-USER: CPT | Performed by: NURSE PRACTITIONER
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
## 2019-05-21 VITALS
BODY MASS INDEX: 40.24 KG/M2 | DIASTOLIC BLOOD PRESSURE: 87 MMHG | WEIGHT: 220 LBS | HEART RATE: 106 BPM | SYSTOLIC BLOOD PRESSURE: 124 MMHG

## 2019-05-29 ENCOUNTER — ROUTINE PRENATAL (OUTPATIENT)
Dept: OBGYN CLINIC | Age: 24
End: 2019-05-29
Payer: COMMERCIAL

## 2019-05-29 VITALS
SYSTOLIC BLOOD PRESSURE: 118 MMHG | DIASTOLIC BLOOD PRESSURE: 69 MMHG | WEIGHT: 221 LBS | HEART RATE: 103 BPM | BODY MASS INDEX: 40.42 KG/M2

## 2019-05-29 DIAGNOSIS — Z3A.31 31 WEEKS GESTATION OF PREGNANCY: ICD-10-CM

## 2019-05-29 DIAGNOSIS — O44.42 LOW LYING PLACENTA NOS OR WITHOUT HEMORRHAGE, SECOND TRIMESTER: ICD-10-CM

## 2019-05-29 DIAGNOSIS — A74.9 CHLAMYDIA INFECTION AFFECTING PREGNANCY IN THIRD TRIMESTER: ICD-10-CM

## 2019-05-29 DIAGNOSIS — Z03.72 SUSPECTED PLACENTAL PROBLEM NOT FOUND: ICD-10-CM

## 2019-05-29 DIAGNOSIS — E66.01 MATERNAL MORBID OBESITY IN THIRD TRIMESTER, ANTEPARTUM (HCC): Primary | ICD-10-CM

## 2019-05-29 DIAGNOSIS — O99.213 MATERNAL MORBID OBESITY IN THIRD TRIMESTER, ANTEPARTUM (HCC): Primary | ICD-10-CM

## 2019-05-29 DIAGNOSIS — Z36.89 ENCOUNTER FOR FETAL ANATOMIC SURVEY: ICD-10-CM

## 2019-05-29 DIAGNOSIS — O98.813 CHLAMYDIA INFECTION AFFECTING PREGNANCY IN THIRD TRIMESTER: ICD-10-CM

## 2019-05-29 LAB
GLUCOSE URINE, POC: NORMAL
PROTEIN UA: POSITIVE

## 2019-05-29 PROCEDURE — 76805 OB US >/= 14 WKS SNGL FETUS: CPT | Performed by: OBSTETRICS & GYNECOLOGY

## 2019-05-29 PROCEDURE — 76819 FETAL BIOPHYS PROFIL W/O NST: CPT | Performed by: OBSTETRICS & GYNECOLOGY

## 2019-05-29 PROCEDURE — 81002 URINALYSIS NONAUTO W/O SCOPE: CPT | Performed by: OBSTETRICS & GYNECOLOGY

## 2019-05-29 PROCEDURE — 99213 OFFICE O/P EST LOW 20 MIN: CPT | Performed by: OBSTETRICS & GYNECOLOGY

## 2019-05-29 PROCEDURE — G8427 DOCREV CUR MEDS BY ELIG CLIN: HCPCS | Performed by: OBSTETRICS & GYNECOLOGY

## 2019-05-29 PROCEDURE — G8417 CALC BMI ABV UP PARAM F/U: HCPCS | Performed by: OBSTETRICS & GYNECOLOGY

## 2019-05-29 PROCEDURE — 76817 TRANSVAGINAL US OBSTETRIC: CPT | Performed by: OBSTETRICS & GYNECOLOGY

## 2019-05-29 PROCEDURE — 99211 OFF/OP EST MAY X REQ PHY/QHP: CPT | Performed by: OBSTETRICS & GYNECOLOGY

## 2019-05-29 PROCEDURE — 1036F TOBACCO NON-USER: CPT | Performed by: OBSTETRICS & GYNECOLOGY

## 2019-05-29 NOTE — PROGRESS NOTES
No c/o. Good fertal movement. Kick counts encouraged. Denies bleeding,.lof,ctx. All questions answered+ information confirmed by pt
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
## 2019-05-29 NOTE — PATIENT INSTRUCTIONS
Call your primary obstetrician with bleeding, leaking of fluid, abdominal tenderness, headache, blurry vision, epigastric pain and increased urinary frequency. Any questions contact Arnold at [phone]. If you are experiencing an emergency and need immediate help, call 911 or go to go emergency room or labor and delivery. Do kick counts after dinner. Call your primary obstetrician if less than 10 kicks in 2 hours after dinner. Call your primary obstetrician with bleeding, leaking of fluid, abdominal tenderness, headache, blurry vision, epigastric pain and increased urinary frequency. if you are sick, not feeling well or have an infectious process going on please reschedule your appointment by calling [phone]. Also if any family members are not feeling well, please do not bring them to your appointment. We appreciate your cooperation. We are doing this in order to protect our pregnant mothers+ their babies. Patient Education        Weeks 30 to 28 of Your Pregnancy: Care Instructions  Your Care Instructions    You have made it to the final months of your pregnancy. By now, your baby is really starting to look like a baby, with hair and plump skin. As you enter the final weeks of pregnancy, the reality of having a baby may start to set in. This is the time to settle on a name, get your household in order, set up a safe nursery, and find quality  if needed. Doing these things in advance will allow you to focus on caring for and enjoying your new baby. You may also want to have a tour of your hospital's labor and delivery unit to get a better idea of what to expect while you are in the hospital.  During these last months, it is very important to take good care of yourself and pay attention to what your body needs. If your doctor says it is okay for you to work, don't push yourself too hard. Use the tips provided in this care sheet to ease heartburn and care for varicose veins.   If you haven't already had the Tdap shot during this pregnancy, talk to your doctor about getting it. It will help protect your  against pertussis infection. Follow-up care is a key part of your treatment and safety. Be sure to make and go to all appointments, and call your doctor if you are having problems. It's also a good idea to know your test results and keep a list of the medicines you take. How can you care for yourself at home? Pay attention to your baby's movements  · You should feel your baby move several times every day. · Your baby now turns less, and kicks and jabs more. · Your baby sleeps 20 to 45 minutes at a time and is more active at certain times of day. · If your doctor wants you to count your baby's kicks:  ? Empty your bladder, and lie on your side or relax in a comfortable chair. ? Write down your start time. ? Pay attention only to your baby's movements. Count any movement except hiccups. ? After you have counted 10 movements, write down your stop time. ? Write down how many minutes it took for your baby to move 10 times. ? If an hour goes by and you have not recorded 10 movements, have something to eat or drink and then count for another hour. If you do not record 10 movements in either hour, call your doctor. Ease heartburn  · Eat small, frequent meals. · Do not eat chocolate, peppermint, or very spicy foods. Avoid drinks with caffeine, such as coffee, tea, and sodas. · Avoid bending over or lying down after meals. · Talk a short walk after you eat. · If heartburn is a problem at night, do not eat for 2 hours before bedtime. · Take antacids like Mylanta, Maalox, Rolaids, or Tums. Do not take antacids that have sodium bicarbonate. Care for varicose veins  · Varicose veins are blood vessels that stretch out with the extra blood during pregnancy. Your legs may ache or throb. Most varicose veins will go away after the birth. · Avoid standing for long periods of time.  Sit with your legs feet.  ? New vision problems (such as dimness or blurring). ? A severe headache. · You have a sudden release of fluid from your vagina. (You think your water broke.)  · You think that you may be in labor. This means that you've had at least 4 contractions within 20 minutes or at least 8 contractions in an hour. · You notice that your baby has stopped moving or is moving much less than normal.  · You have symptoms of a urinary tract infection. These may include:  ? Pain or burning when you urinate. ? A frequent need to urinate without being able to pass much urine. ? Pain in the flank, which is just below the rib cage and above the waist on either side of the back. ? Blood in your urine. Watch closely for changes in your health, and be sure to contact your doctor if:  · You have vaginal discharge that smells bad. · You have skin changes, such as:  ? A rash. ? Itching. ? Yellow color to your skin. · You have other concerns about your pregnancy. If you have labor signs at 37 weeks or more  If you have signs of labor at 37 weeks or more, your doctor may tell you to call when your labor becomes more active. Symptoms of active labor include:  · Contractions that are regular. · Contractions that are less than 5 minutes apart. · Contractions that are hard to talk through. Follow-up care is a key part of your treatment and safety. Be sure to make and go to all appointments, and call your doctor if you are having problems. It's also a good idea to know your test results and keep a list of the medicines you take. Where can you learn more? Go to https://amy.[employer]. org and sign in to your [employer] account. Enter  in the [employer] box to learn more about \"Learning About When to Call Your Doctor During Pregnancy (After 20 Weeks). \"     If you do not have an account, please click on the \"Sign Up Now\" link.   Current as of: September 5, 2018  Content Version: 12.0  © 0706-7079 Healthwise, Incorporated. Care instructions adapted under license by [facility] ([facility]). If you have questions about a medical condition or this instruction, always ask your healthcare professional. [address] any warranty or liability for your use of this information. Patient Education        Counting Your Baby's Kicks: Care Instructions  Your Care Instructions    Counting your baby's kicks is one way your doctor can tell that your baby is healthy. Most women--especially in a first pregnancy--feel their baby move for the first time between 16 and 22 weeks. The movement may feel like flutters rather than kicks. Your baby may move more at certain times of the day. When you are active, you may notice less kicking than when you are resting. At your prenatal visits, your doctor will ask whether the baby is active. In your last trimester, your doctor may ask you to count the number of times you feel your baby move. Follow-up care is a key part of your treatment and safety. Be sure to make and go to all appointments, and call your doctor if you are having problems. It's also a good idea to know your test results and keep a list of the medicines you take. How do you count fetal kicks? · A common method of checking your baby's movement is to count the number of kicks or moves you feel in 1 hour. Ten movements (such as kicks, flutters, or rolls) in 1 hour are normal. Some doctors suggest that you count in the morning until you get to 10 movements. Then you can quit for that day and start again the next day. · Pick your baby's most active time of day to count. This may be any time from morning to evening. · If you do not feel 10 movements in an hour, your baby may be sleeping. Wait for the next hour and count again. When should you call for help?   Call your doctor now or seek immediate medical care if:    · You noticed that your baby has stopped moving or is moving much less than normal.
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
## 2019-05-30 ENCOUNTER — TELEPHONE (OUTPATIENT)
Dept: OBGYN | Age: 24
End: 2019-05-30

## 2019-05-30 NOTE — TELEPHONE ENCOUNTER
Attempted to call patient to ask about her 1 hour GTT test.  Need to know if and when she had this completed.

## 2019-05-31 ENCOUNTER — TELEPHONE (OUTPATIENT)
Dept: OBGYN | Age: 24
End: 2019-05-31

## 2019-05-31 NOTE — TELEPHONE ENCOUNTER
Reminded Pt of centering appointment Monday. Pt had asked if centering was at normal site or new site. This CHW confirmed it will be at our normal site.

## 2019-06-03 ENCOUNTER — ROUTINE PRENATAL (OUTPATIENT)
Dept: OBGYN | Age: 24
End: 2019-06-03
Payer: COMMERCIAL

## 2019-06-03 ENCOUNTER — APPOINTMENT (OUTPATIENT)
Dept: ULTRASOUND IMAGING | Age: 24
End: 2019-06-03
Payer: COMMERCIAL

## 2019-06-03 ENCOUNTER — HOSPITAL ENCOUNTER (EMERGENCY)
Age: 24
Discharge: HOME | End: 2019-06-03
Attending: EMERGENCY MEDICINE
Payer: COMMERCIAL

## 2019-06-03 VITALS
SYSTOLIC BLOOD PRESSURE: 130 MMHG | WEIGHT: 218 LBS | HEART RATE: 100 BPM | DIASTOLIC BLOOD PRESSURE: 85 MMHG | BODY MASS INDEX: 39.87 KG/M2

## 2019-06-03 VITALS
TEMPERATURE: 98.3 F | HEIGHT: 63 IN | SYSTOLIC BLOOD PRESSURE: 104 MMHG | RESPIRATION RATE: 18 BRPM | OXYGEN SATURATION: 98 % | HEART RATE: 98 BPM | BODY MASS INDEX: 38.62 KG/M2 | DIASTOLIC BLOOD PRESSURE: 66 MMHG | WEIGHT: 218 LBS

## 2019-06-03 DIAGNOSIS — Z3A.31 31 WEEKS GESTATION OF PREGNANCY: ICD-10-CM

## 2019-06-03 DIAGNOSIS — E05.90 HYPERTHYROIDISM AFFECTING PREGNANCY IN THIRD TRIMESTER: ICD-10-CM

## 2019-06-03 DIAGNOSIS — E05.90 HYPERTHYROIDISM: ICD-10-CM

## 2019-06-03 DIAGNOSIS — E04.1 THYROID NODULE: Primary | ICD-10-CM

## 2019-06-03 DIAGNOSIS — Z34.93 PRENATAL CARE IN THIRD TRIMESTER: Primary | ICD-10-CM

## 2019-06-03 DIAGNOSIS — O99.283 HYPERTHYROIDISM AFFECTING PREGNANCY IN THIRD TRIMESTER: ICD-10-CM

## 2019-06-03 LAB
ANION GAP SERPL CALCULATED.3IONS-SCNC: 14 MMOL/L (ref 7–16)
BUN BLDV-MCNC: 9 MG/DL (ref 6–20)
CALCIUM SERPL-MCNC: 8.9 MG/DL (ref 8.6–10.2)
CHLORIDE BLD-SCNC: 98 MMOL/L (ref 98–107)
CO2: 22 MMOL/L (ref 22–29)
CREAT SERPL-MCNC: 0.4 MG/DL (ref 0.5–1)
GFR AFRICAN AMERICAN: >60
GFR NON-AFRICAN AMERICAN: >60 ML/MIN/1.73
GLUCOSE BLD-MCNC: 90 MG/DL (ref 74–99)
GLUCOSE URINE, POC: NORMAL
HCT VFR BLD CALC: 35.7 % (ref 34–48)
HEMOGLOBIN: 11.9 G/DL (ref 11.5–15.5)
MCH RBC QN AUTO: 29.2 PG (ref 26–35)
MCHC RBC AUTO-ENTMCNC: 33.3 % (ref 32–34.5)
MCV RBC AUTO: 87.5 FL (ref 80–99.9)
PDW BLD-RTO: 12.8 FL (ref 11.5–15)
PLATELET # BLD: 205 E9/L (ref 130–450)
PMV BLD AUTO: 11.1 FL (ref 7–12)
POTASSIUM REFLEX MAGNESIUM: 4.1 MMOL/L (ref 3.5–5)
PROTEIN UA: NORMAL
RBC # BLD: 4.08 E12/L (ref 3.5–5.5)
SODIUM BLD-SCNC: 134 MMOL/L (ref 132–146)
T4 TOTAL: 17.2 MCG/DL (ref 4.5–11.7)
TSH SERPL DL<=0.05 MIU/L-ACNC: <0.005 UIU/ML (ref 0.27–4.2)
WBC # BLD: 11.6 E9/L (ref 4.5–11.5)

## 2019-06-03 PROCEDURE — 99283 EMERGENCY DEPT VISIT LOW MDM: CPT

## 2019-06-03 PROCEDURE — 84443 ASSAY THYROID STIM HORMONE: CPT

## 2019-06-03 PROCEDURE — 99213 OFFICE O/P EST LOW 20 MIN: CPT | Performed by: NURSE PRACTITIONER

## 2019-06-03 PROCEDURE — 99212 OFFICE O/P EST SF 10 MIN: CPT | Performed by: NURSE PRACTITIONER

## 2019-06-03 PROCEDURE — 2580000003 HC RX 258: Performed by: STUDENT IN AN ORGANIZED HEALTH CARE EDUCATION/TRAINING PROGRAM

## 2019-06-03 PROCEDURE — 76536 US EXAM OF HEAD AND NECK: CPT

## 2019-06-03 PROCEDURE — 80048 BASIC METABOLIC PNL TOTAL CA: CPT

## 2019-06-03 PROCEDURE — G8417 CALC BMI ABV UP PARAM F/U: HCPCS | Performed by: NURSE PRACTITIONER

## 2019-06-03 PROCEDURE — 84436 ASSAY OF TOTAL THYROXINE: CPT

## 2019-06-03 PROCEDURE — G8427 DOCREV CUR MEDS BY ELIG CLIN: HCPCS | Performed by: NURSE PRACTITIONER

## 2019-06-03 PROCEDURE — 85027 COMPLETE CBC AUTOMATED: CPT

## 2019-06-03 PROCEDURE — 81002 URINALYSIS NONAUTO W/O SCOPE: CPT | Performed by: NURSE PRACTITIONER

## 2019-06-03 PROCEDURE — 1036F TOBACCO NON-USER: CPT | Performed by: NURSE PRACTITIONER

## 2019-06-03 RX ORDER — 0.9 % SODIUM CHLORIDE 0.9 %
1000 INTRAVENOUS SOLUTION INTRAVENOUS ONCE
Status: COMPLETED | OUTPATIENT
Start: 2019-06-03 | End: 2019-06-03

## 2019-06-03 RX ADMIN — SODIUM CHLORIDE 1000 ML: 9 INJECTION, SOLUTION INTRAVENOUS at 16:38

## 2019-06-03 ASSESSMENT — PAIN DESCRIPTION - FREQUENCY: FREQUENCY: CONTINUOUS

## 2019-06-03 ASSESSMENT — ENCOUNTER SYMPTOMS
WHEEZING: 0
TROUBLE SWALLOWING: 0
EYE REDNESS: 0
VOMITING: 0
BACK PAIN: 0
ABDOMINAL PAIN: 0
NAUSEA: 0
SORE THROAT: 0
COUGH: 0
SHORTNESS OF BREATH: 0
STRIDOR: 0
DIARRHEA: 0

## 2019-06-03 ASSESSMENT — PAIN DESCRIPTION - PAIN TYPE: TYPE: ACUTE PAIN

## 2019-06-03 ASSESSMENT — PAIN DESCRIPTION - ORIENTATION: ORIENTATION: RIGHT

## 2019-06-03 ASSESSMENT — PAIN - FUNCTIONAL ASSESSMENT: PAIN_FUNCTIONAL_ASSESSMENT: ACTIVITIES ARE NOT PREVENTED

## 2019-06-03 ASSESSMENT — PAIN SCALES - GENERAL: PAINLEVEL_OUTOF10: 7

## 2019-06-03 ASSESSMENT — PAIN DESCRIPTION - DESCRIPTORS: DESCRIPTORS: STABBING;PRESSURE

## 2019-06-03 ASSESSMENT — PAIN DESCRIPTION - LOCATION: LOCATION: NECK

## 2019-06-03 ASSESSMENT — PAIN DESCRIPTION - ONSET: ONSET: SUDDEN

## 2019-06-03 NOTE — PROGRESS NOTES
Here for centering pregnancy session 5. Participated well. Appointment card given for next time. Urine dip stick done. Discharge instructions given by hyun cuellar cnp. Patient c/o of neck swelling thyroid area and hurts to touch. . Difficulty swallowing. Per hyun cuellar REPEAT GTT held today due to patient having difficulty swallowing. patient wants to stay for centering session patient instructed to go to [facility] emergency room to have that checked. Dr Devante Quiñones fcalled and informed ED doctor there and gave report to them. patient agreed and and will go to [facility] emergency.

## 2019-06-03 NOTE — PROGRESS NOTES
Pt attended CenteringPregnancy session 5. Topics discussed included what did you eat for breakfast dinner and lunch, how healthy is it?; What did you hear about pregnancy that isn't healthy? Plans for during labor, activities to do, birth balls and exercises you can do in pregnancy. Encouraged to contact this worker as needed.

## 2019-06-03 NOTE — ED NOTES
Presents with c/c of increased size and pain with thyroid mass that patient has had since 2017. Able to speak and swallow, c/o pain with swallowing.   Pt did not follow-up after initial diagnosis     Isma Villa RN  06/03/19 0685
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
## 2019-06-03 NOTE — PATIENT INSTRUCTIONS
Return in 2 weeks for next centering session 6. Go to [address] [place] [address] emergency immediately after this session to have neck swelling checked out. Discharged by Bib Black cnp.

## 2019-06-03 NOTE — PROGRESS NOTES
Centering Pregnancy Session    CC: Centering Pregnancy Visit    Geoff Boyle is a 21 y.o. female who presents today for a prenatal visit at 35w11d with Centering Pregnancy Session 5.    OB History    Para Term  AB Living   1             SAB TAB Ectopic Molar Multiple Live Births                    # Outcome Date GA Lbr Chava/2nd Weight Sex Delivery Anes PTL Lv   1 Current                Subjective:    Patient is doing well. She does complain of some right sided neck pain and swelling. She states she has noticed this before and had an unltrasound done a year ago. She states she never received a call regarding this. She states it has been bothering her for the past 2 days. It has gotten to the point that she is having a difficult time swallowing and feels pressure in her neck. She denies   - vaginal bleeding  - gush of fluid  - regular uterine contractions  - abdominal pain    She states that fetal movement has been sufficient     Patient is taking prenatal vitamin. Other medications include:   Current Outpatient Medications   Medication Sig Dispense Refill    ferrous sulfate 325 (65 Fe) MG tablet Take 1 tablet by mouth daily (with breakfast) 30 tablet 5    Prenatal Multivit-Min-Fe-FA (PRE- PO) Take by mouth daily       No current facility-administered medications for this visit. Objective:  Visible mass of right half of thyroid. Tender to palpation. Tenderness to neck muscles. No erythema. No trauma. Patient appears well, friendly  Fetal Heart Rate: 156  Fundal Height: 35 cm     Ref. Range 6/3/2019 13:30   Protein, UA Latest Ref Range: Negative  trace   Glucose, UA POC Unknown neg       Assessment:  1. Geoff Boyle is a 21 y.o. female  2.   3. 31w5d  4. Neck swelling     Plan:    1. Centering Pregnancy Session 5 ; topics discussed included  - nutrition  - labor & delivery  - pediatric care    2. Pregnancy in the 3rd trimester  - POCT urine for glucose and protein     3.  Neck

## 2019-06-04 ENCOUNTER — TELEPHONE (OUTPATIENT)
Dept: FAMILY MEDICINE CLINIC | Age: 24
End: 2019-06-04

## 2019-06-04 ENCOUNTER — TELEPHONE (OUTPATIENT)
Dept: ENDOCRINOLOGY | Age: 24
End: 2019-06-04

## 2019-06-04 DIAGNOSIS — E05.90 HYPERTHYROIDISM AFFECTING PREGNANCY IN THIRD TRIMESTER: Primary | ICD-10-CM

## 2019-06-04 DIAGNOSIS — O99.283 HYPERTHYROIDISM AFFECTING PREGNANCY IN THIRD TRIMESTER: Primary | ICD-10-CM

## 2019-06-04 NOTE — TELEPHONE ENCOUNTER
Patient case discussed with Dr. Nima Dukes (Endocrinology). He is willing to see the patient this week. Information sent through [facility] serve to Dr. Nima Dukes for scheduling. He requested additional labs be drawn. Orders placed.
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
## 2019-06-04 NOTE — PROGRESS NOTES
Pt seen in the ED. Found to be hyperthyroid. TSH <0.005 ulU/mL, Free T4 17.2 mcg/dL. She was given a referral to Endocrinology. She already sees Dr. Wes Chirinos ([facility]). Pt called me this morning stating she needs a referral for Endocrine from her PCP. I will place referral to Dr. Fly Bethea. Also, I will order thyroid uptake scan to differentiate between thyroiditis and graves disease.      6/4/19  8:40 AM  Brigid Gibson

## 2019-06-05 ENCOUNTER — HOSPITAL ENCOUNTER (OUTPATIENT)
Age: 24
Discharge: HOME | End: 2019-06-05
Payer: COMMERCIAL

## 2019-06-05 DIAGNOSIS — E05.90 HYPERTHYROIDISM AFFECTING PREGNANCY IN THIRD TRIMESTER: ICD-10-CM

## 2019-06-05 DIAGNOSIS — O99.283 HYPERTHYROIDISM AFFECTING PREGNANCY IN THIRD TRIMESTER: ICD-10-CM

## 2019-06-05 LAB
T3 TOTAL: 383.3 NG/DL (ref 80–200)
T4 FREE: 2.24 NG/DL (ref 0.93–1.7)
TSH SERPL DL<=0.05 MIU/L-ACNC: <0.005 UIU/ML (ref 0.27–4.2)

## 2019-06-05 PROCEDURE — 84443 ASSAY THYROID STIM HORMONE: CPT

## 2019-06-05 PROCEDURE — 84439 ASSAY OF FREE THYROXINE: CPT

## 2019-06-05 PROCEDURE — 84480 ASSAY TRIIODOTHYRONINE (T3): CPT

## 2019-06-05 PROCEDURE — 86800 THYROGLOBULIN ANTIBODY: CPT

## 2019-06-05 PROCEDURE — 86376 MICROSOMAL ANTIBODY EACH: CPT

## 2019-06-05 PROCEDURE — 84445 ASSAY OF TSI GLOBULIN: CPT

## 2019-06-05 PROCEDURE — 36415 COLL VENOUS BLD VENIPUNCTURE: CPT

## 2019-06-05 NOTE — TELEPHONE ENCOUNTER
Spoke with patient this morning, advised to have labs done. She has not yet received call from Dr. Milly Stafford office regarding scheduling. She was advised that she will see him next week. He will then decide if she needs the scan but she is scheduled for it at this time.  She stated she would go have the blood drawn this afternoon at the [facility].

## 2019-06-06 ENCOUNTER — TELEPHONE (OUTPATIENT)
Dept: ENDOCRINOLOGY | Age: 24
End: 2019-06-06

## 2019-06-06 DIAGNOSIS — E05.80 OTHER THYROTOXICOSIS WITHOUT THYROTOXIC CRISIS OR STORM: Primary | ICD-10-CM

## 2019-06-06 RX ORDER — METHIMAZOLE 5 MG/1
5 TABLET ORAL DAILY
Qty: 30 TABLET | Refills: 1 | Status: SHIPPED | OUTPATIENT
Start: 2019-06-06 | End: 2019-08-06

## 2019-06-07 LAB
THYROGLOBULIN AB: <0.9 IU/ML (ref 0–4)
THYROID PEROXIDASE (TPO) ABS: 0.8 IU/ML (ref 0–9)

## 2019-06-09 LAB — THYROID STIMULATING IMMUNOGLOBULIN: 96 %

## 2019-06-12 ENCOUNTER — TELEPHONE (OUTPATIENT)
Dept: ENDOCRINOLOGY | Age: 24
End: 2019-06-12

## 2019-06-12 NOTE — TELEPHONE ENCOUNTER
----- Message from Sakshi Burch MD sent at 6/11/2019  8:57 PM EDT -----  Ms. Roselyn Jeffers missed her appt on Monday. Please [facility].

## 2019-06-17 ENCOUNTER — HOSPITAL ENCOUNTER (OUTPATIENT)
Age: 24
Discharge: HOME | End: 2019-06-17
Payer: COMMERCIAL

## 2019-06-17 ENCOUNTER — ROUTINE PRENATAL (OUTPATIENT)
Dept: OBGYN | Age: 24
End: 2019-06-17
Payer: COMMERCIAL

## 2019-06-17 VITALS
DIASTOLIC BLOOD PRESSURE: 65 MMHG | WEIGHT: 222.6 LBS | HEART RATE: 107 BPM | BODY MASS INDEX: 39.43 KG/M2 | SYSTOLIC BLOOD PRESSURE: 119 MMHG

## 2019-06-17 DIAGNOSIS — Z34.93 PRENATAL CARE IN THIRD TRIMESTER: Primary | ICD-10-CM

## 2019-06-17 LAB
GLUCOSE TOLERANCE SCREEN 50G: 158 MG/DL (ref 70–140)
GLUCOSE URINE, POC: NEGATIVE
PROTEIN UA: ABNORMAL

## 2019-06-17 PROCEDURE — 99212 OFFICE O/P EST SF 10 MIN: CPT | Performed by: NURSE PRACTITIONER

## 2019-06-17 PROCEDURE — G8417 CALC BMI ABV UP PARAM F/U: HCPCS | Performed by: NURSE PRACTITIONER

## 2019-06-17 PROCEDURE — 82950 GLUCOSE TEST: CPT

## 2019-06-17 PROCEDURE — 81002 URINALYSIS NONAUTO W/O SCOPE: CPT | Performed by: NURSE PRACTITIONER

## 2019-06-17 PROCEDURE — 99213 OFFICE O/P EST LOW 20 MIN: CPT | Performed by: NURSE PRACTITIONER

## 2019-06-17 PROCEDURE — 36415 COLL VENOUS BLD VENIPUNCTURE: CPT

## 2019-06-17 PROCEDURE — 1036F TOBACCO NON-USER: CPT | Performed by: NURSE PRACTITIONER

## 2019-06-17 PROCEDURE — G8427 DOCREV CUR MEDS BY ELIG CLIN: HCPCS | Performed by: NURSE PRACTITIONER

## 2019-06-17 PROCEDURE — 36415 COLL VENOUS BLD VENIPUNCTURE: CPT | Performed by: NURSE PRACTITIONER

## 2019-06-17 NOTE — PROGRESS NOTES
Centering Pregnancy Session    CC: Centering Pregnancy Visit    Paulette Del Cid is a 21 y.o. female who presents today for a prenatal visit at 32w6d with Centering Pregnancy Session 6.    OB History    Para Term  AB Living   1             SAB TAB Ectopic Molar Multiple Live Births                    # Outcome Date GA Lbr Chava/2nd Weight Sex Delivery Anes PTL Lv   1 Current                Subjective:    Patient is doing well. She denies   - vaginal bleeding  - gush of fluid  - regular uterine contractions  - abdominal pain    She has an appointment scheduled with Endocrinology tomorrow. She has been taking methimazole 5 mg daily and has been tolerating this well. Her throat no longer hurts. She states that fetal movement has been sufficient     Patient is taking prenatal vitamin. Complaints include: none, neck feels much improved    Other medications include:   Current Outpatient Medications   Medication Sig Dispense Refill    methimazole (TAPAZOLE) 5 MG tablet Take 1 tablet by mouth daily 30 tablet 1    ferrous sulfate 325 (65 Fe) MG tablet Take 1 tablet by mouth daily (with breakfast) 30 tablet 5    Prenatal Multivit-Min-Fe-FA (PRE- PO) Take by mouth daily       No current facility-administered medications for this visit. Objective:    Patient appears well, friendly  Fetal Heart Rate: 175   Fundal Height: 35 cm       Assessment:    1. Paulette Del Cid is a 21 y.o. female  2.   3. 33w5d  4. Hyperthyroidism     Plan:  1. Centering Pregnancy Session 6 ; topics discussed included  - labor and delivery  - relaxation techniques  - safe sleep     2. Pregnancy in the 3 trimester  - POCT urine for glucose and protein: albumin trace, glucose neg    3. Hyperthyroidism  - follow up with endocrinology  - feeling well  - slightly tachy  - tolerating medication      Patient advised to return for next centering pregnancy session in 2 weeks, earlier if needed.  She was instructed to call if she
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
## 2019-06-18 ENCOUNTER — OFFICE VISIT (OUTPATIENT)
Dept: ENDOCRINOLOGY | Age: 24
End: 2019-06-18
Payer: COMMERCIAL

## 2019-06-18 VITALS
RESPIRATION RATE: 16 BRPM | HEIGHT: 63 IN | OXYGEN SATURATION: 98 % | WEIGHT: 223.4 LBS | DIASTOLIC BLOOD PRESSURE: 68 MMHG | BODY MASS INDEX: 39.58 KG/M2 | HEART RATE: 106 BPM | SYSTOLIC BLOOD PRESSURE: 118 MMHG

## 2019-06-18 DIAGNOSIS — E04.2 MULTINODULAR GOITER: ICD-10-CM

## 2019-06-18 DIAGNOSIS — E05.90 HYPERTHYROIDISM: Primary | ICD-10-CM

## 2019-06-18 PROCEDURE — 99205 OFFICE O/P NEW HI 60 MIN: CPT | Performed by: INTERNAL MEDICINE

## 2019-06-18 PROCEDURE — G8417 CALC BMI ABV UP PARAM F/U: HCPCS | Performed by: INTERNAL MEDICINE

## 2019-06-18 PROCEDURE — G8427 DOCREV CUR MEDS BY ELIG CLIN: HCPCS | Performed by: INTERNAL MEDICINE

## 2019-06-18 PROCEDURE — 1036F TOBACCO NON-USER: CPT | Performed by: INTERNAL MEDICINE

## 2019-06-18 NOTE — PROGRESS NOTES
[address] [place] [address] Department of Endocrinology Diabetes and Metabolism   [address] [facility] [address]   Phone: [phone]  Fax: [phone]    Date of Service: 6/18/2019    Medical Records Reviewed:   I personally reviewed and summarized previous records     Referring physician: Adrian Saravia MD  Provider: Richard Valerio MD    Reason for the visit:  Hyperthyroidism, MNG     History of Present Illness: The history is provided by the patient. No  was used. Accuracy of the patient data is excellent. Danisha Roca is a very pleasant 21 y.o. female seen in the clinic today for evaluation and management of hyperthyroidism     Danisha Roca was was found to have thyroid nodules on physical examination in 11/2017   Thyroid US 11/2017, I have reviewed images myself   The right lobe of the thyroid is heterogeneous in echotexture  Rt lobe measures 5.1 x 2.5 x 3.2 cm --> heterogeneous nodule  measures 4.2 x 2.3 x 2.8 cm  Lt lobe measures 3.3 x 1.2 x 1.0 cm --> heterogeneous nodule measures 0.3 x 0.4 x 0.5 cm   Isthmus measures 2.5 mm --> mo nodules     Thyroid function test 11/2017 showed hyperthyroidism   Component 11/1/2017   TSH <0.005 (L)   T4 Free 1.43    T3, Free 3.9      Pt didn't receive any treatment for hyperthyroidism    Now she is 36 weeks pregnant   Recent labs 6/5/2019 showed that she is thyrotoxic and started on Methimazole 5 mg daily   Component 6/5/2019   TPO-Ab  0.0 - 9.0 IU/mL 0.8   Thyroglobulin Ab 0.0 - 4.0 IU/mL <0.9   T3, Total 383.3 (H)    TSI    <=122 % 96   T4 Free  0.93 - 1.70 ng/dL 2.24 (H)   TSH 0.270 - 4.200 uIU/mL <0.005 (L)      Recent thyroid US 6/3/2019, I have reviewed images myself   Rt lobe measures 5.4 x 3.3 x 4.4 cm --> there is large nodule measuring 4 x 2.7 x 2.2 cm  Lt measures 2.9 x 0.8 x 0.8 cm --> small nodule stable small nodule measuring 4 x 3 x 4 mm.   Isthmus unremarkable             Today, she feels better, denied any palpitations, nervousness, anxiety, irritability, tremor, sweating, heat intolerance    PAST MEDICAL HISTORY   Past Medical History:   Diagnosis Date    Seizures ([facility] [address]     as young but placed on no treatment    Thyroid disease     Trauma      PAST SURGICAL HISTORY   Past Surgical History:   Procedure Laterality Date    FOOT SURGERY      LARYNGOSCOPY       SOCIAL HISTORY   Social History     Socioeconomic History    Marital status: Single     Spouse name: Not on file    Number of children: Not on file    Years of education: Not on file    Highest education level: Not on file   Occupational History    Not on file   Social Needs    Financial resource strain: Not on file    Food insecurity:     Worry: Not on file     Inability: Not on file    Transportation needs:     Medical: Not on file     Non-medical: Not on file   Tobacco Use    Smoking status: Never Smoker    Smokeless tobacco: Never Used   Substance and Sexual Activity    Alcohol use: No    Drug use: No    Sexual activity: Yes     Partners: Male   Lifestyle    Physical activity:     Days per week: Not on file     Minutes per session: Not on file    Stress: Not on file   Relationships    Social connections:     Talks on phone: Not on file     Gets together: Not on file     Attends Yazidism service: Not on file     Active member of club or organization: Not on file     Attends meetings of clubs or organizations: Not on file     Relationship status: Not on file    Intimate partner violence:     Fear of current or ex partner: Not on file     Emotionally abused: Not on file     Physically abused: Not on file     Forced sexual activity: Not on file   Other Topics Concern    Not on file   Social History Narrative    Not on file     FAMILY HISTORY   Family History   Problem Relation Age of Onset    Thyroid Cancer Neg Hx      ALLERGIES AND DRUG REACTIONS   No Known Allergies    CURRENT MEDICATIONS   Current Outpatient Medications   Medication Sig Dispense Refill    methimazole (TAPAZOLE) 5 MG tablet Take 1 tablet by mouth daily 30 tablet 1    ferrous sulfate 325 (65 Fe) MG tablet Take 1 tablet by mouth daily (with breakfast) 30 tablet 5    Prenatal Multivit-Min-Fe-FA (PRE-RENEE PO) Take by mouth daily       No current facility-administered medications for this visit. Review of Systems  Constitutional: Pregnant   HEENT: No blurred vision, No sore throat, no ear pain, no hair loss  Neck: denied any neck swelling, difficulty swallowing,   Cadrdiopulomary: No CP, SOB or palpitation, No orthopnea or PND. No cough or wheezing. GI: No N/V/D, no constipation, No abdominal pain, no melena or hematochezia   : Denied any dysuria, hematuria, flank pain, discharge, or incontinence. Skin: denied any rash, ulcer, Hirsute, or hyperpigmentation. MSK: denied any joint deformity, joint pain/swelling, muscle pain, or back pain. Neuro: no numbess, no tingling, no weakness,     OBJECTIVE    /68 (Site: Right Upper Arm, Position: Sitting, Cuff Size: Medium Adult)   Pulse 106   Resp 16   Ht 5' 3\" (1.6 m)   Wt 223 lb 6.4 oz (101.3 kg)   SpO2 98%   BMI 39.57 kg/m²   BP Readings from Last 4 Encounters:   19 118/68   19 119/65   19 104/66   19 130/85     Wt Readings from Last 6 Encounters:   19 223 lb 6.4 oz (101.3 kg)   19 222 lb 9.6 oz (101 kg)   19 218 lb (98.9 kg)   19 218 lb (98.9 kg)   19 221 lb (100.2 kg)   19 220 lb (99.8 kg)       Physical examination:  General: awake alert, oriented x3, no abnormal position or movements. 36 weeks pregnant   HEENT: normocephalic non traumatic, no exophthalmos, no lid lag, no lid retraction   Neck: supple, no LN enlargement, + thyromegaly, Rt thyroid nodule palpable, no JVD. Pulm: Clear equal air entry no added sounds, no wheezing or rhonchi    CVS: S1 + S2, no murmur, no heave.  Dorsalis pedis pulse palpable   Abd: soft lax, no tenderness, no organomegaly, audible bowel sounds. Pregnant   Skin: warm, no lesions, no rash. No palmar erythema, no onycholysis, no pretibial Myxoedema, no acropachy   Musculoskeletal: No back tenderness, no kyphosis/scoliosis    Neuro: CN intact, sensation notmal , muscle power normal. No tremors   Psych: normal mood, and affect    Review of Laboratory Data:  I have reviewed the following:  Lab Results   Component Value Date/Time    WBC 11.6 (H) 06/03/2019 01:25 PM    RBC 4.08 06/03/2019 01:25 PM    HGB 11.9 06/03/2019 01:25 PM    HCT 35.7 06/03/2019 01:25 PM    MCV 87.5 06/03/2019 01:25 PM    MCH 29.2 06/03/2019 01:25 PM    MCHC 33.3 06/03/2019 01:25 PM    RDW 12.8 06/03/2019 01:25 PM     06/03/2019 01:25 PM    MPV 11.1 06/03/2019 01:25 PM      Lab Results   Component Value Date/Time     06/03/2019 01:25 PM    K 4.1 06/03/2019 01:25 PM    CO2 22 06/03/2019 01:25 PM    BUN 9 06/03/2019 01:25 PM    CREATININE 0.4 (L) 06/03/2019 01:25 PM    CALCIUM 8.9 06/03/2019 01:25 PM    LABGLOM >60 06/03/2019 01:25 PM    GFRAA >60 06/03/2019 01:25 PM      Lab Results   Component Value Date/Time    TSH <0.005 (L) 06/05/2019 01:50 PM    T4FREE 2.24 (H) 06/05/2019 01:50 PM    A2OIOKE 17.2 (H) 06/03/2019 01:25 PM    FT3 3.9 11/01/2017 12:35 PM    V5GMAKP 383.30 (H) 06/05/2019 01:50 PM    TSI 96 06/05/2019 01:50 PM    TPOABS 0.8 06/05/2019 01:50 PM    THGAB <0.9 06/05/2019 01:50 PM     Lab Results   Component Value Date    GLUCOSE 158 06/17/2019    GLUCOSE 90 06/03/2019     No results found for: CHOL, TRIG, HDL  No results found for: VITD25    Medical Records/Labs/Images Review:   I personally reviewed and summarized previous records   All labs and imaging were reviewed independently    [address] [facility], a 21 y.o.-old female seen in for management of following issues      Hyperthyroidism with large thyroid nodules   · Work up consistent with toxic multinodular goiter  · Pt now 36 weeks gestation.    · Will Continue
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
## 2019-06-26 ENCOUNTER — ROUTINE PRENATAL (OUTPATIENT)
Dept: OBGYN CLINIC | Age: 24
End: 2019-06-26
Payer: COMMERCIAL

## 2019-06-26 VITALS
SYSTOLIC BLOOD PRESSURE: 122 MMHG | BODY MASS INDEX: 39.68 KG/M2 | DIASTOLIC BLOOD PRESSURE: 82 MMHG | HEART RATE: 98 BPM | WEIGHT: 224 LBS

## 2019-06-26 DIAGNOSIS — O99.283 HYPERTHYROIDISM COMPLICATING PREGNANCY, THIRD TRIMESTER: ICD-10-CM

## 2019-06-26 DIAGNOSIS — O98.813 CHLAMYDIA INFECTION AFFECTING PREGNANCY IN THIRD TRIMESTER: ICD-10-CM

## 2019-06-26 DIAGNOSIS — O99.213 MATERNAL MORBID OBESITY IN THIRD TRIMESTER, ANTEPARTUM (HCC): Primary | ICD-10-CM

## 2019-06-26 DIAGNOSIS — A74.9 CHLAMYDIA INFECTION AFFECTING PREGNANCY IN THIRD TRIMESTER: ICD-10-CM

## 2019-06-26 DIAGNOSIS — Z3A.35 35 WEEKS GESTATION OF PREGNANCY: ICD-10-CM

## 2019-06-26 DIAGNOSIS — E04.2 MULTINODULAR GOITER: ICD-10-CM

## 2019-06-26 DIAGNOSIS — E66.01 MATERNAL MORBID OBESITY IN THIRD TRIMESTER, ANTEPARTUM (HCC): Primary | ICD-10-CM

## 2019-06-26 DIAGNOSIS — E05.90 HYPERTHYROIDISM COMPLICATING PREGNANCY, THIRD TRIMESTER: ICD-10-CM

## 2019-06-26 LAB
GLUCOSE URINE, POC: NORMAL
PROTEIN UA: NEGATIVE

## 2019-06-26 PROCEDURE — 99213 OFFICE O/P EST LOW 20 MIN: CPT | Performed by: OBSTETRICS & GYNECOLOGY

## 2019-06-26 PROCEDURE — G8417 CALC BMI ABV UP PARAM F/U: HCPCS | Performed by: OBSTETRICS & GYNECOLOGY

## 2019-06-26 PROCEDURE — 1036F TOBACCO NON-USER: CPT | Performed by: OBSTETRICS & GYNECOLOGY

## 2019-06-26 PROCEDURE — G8427 DOCREV CUR MEDS BY ELIG CLIN: HCPCS | Performed by: OBSTETRICS & GYNECOLOGY

## 2019-06-26 PROCEDURE — 81002 URINALYSIS NONAUTO W/O SCOPE: CPT | Performed by: OBSTETRICS & GYNECOLOGY

## 2019-06-26 PROCEDURE — 76816 OB US FOLLOW-UP PER FETUS: CPT | Performed by: OBSTETRICS & GYNECOLOGY

## 2019-06-26 PROCEDURE — 99211 OFF/OP EST MAY X REQ PHY/QHP: CPT | Performed by: OBSTETRICS & GYNECOLOGY

## 2019-06-26 PROCEDURE — 76819 FETAL BIOPHYS PROFIL W/O NST: CPT | Performed by: OBSTETRICS & GYNECOLOGY

## 2019-06-26 NOTE — LETTER
19     RE:  Art Last   : 1995   AGE: 21 y.o. REFERRING PHYSICIAN:  Emily Chi M.D. Dear   Mrs. Art Last a 21 y.o.  Cortes Shah  is seen today on follow up in our office. REASON FOR APPOINTMENT:  Follow up on a pregnant patient with hyperthyroidism, nodular goiter, obesity and history of chlamydia infection early during pregnancy. MEDICATIONS:    Prenatal Vitamins once per day   Methimazole 5 mg p.o. per day. Ferrous sulfate 325 mg p.o. per day. INTERVAL HISTORY:  Mrs Art Last was evaluated in the emergency room of [facility] in [place] [address] on 6/3/2019 with a neck mass. She was diagnosed to have a multinodular goiter and hyperthyroidism, was seen by endocrinologist and is receiving treatment with methimazole 500 mg p.o. once per day. When seen today in our office she had no complaints, no palpitations no diarrhea no weight loss. PHYSICAL EXAMINATION:  General Appearance:  Healthy looking, alert, no acute distress. Eyes:     No pallor, no icterus, no photophobia. Ears:     No ear drainage. Nose:     No nasal drainage, no paranasal sinus tenderness. Throat:   Mucosa moist, no oral thrush, no exudate. (5 cm right neck nodule)  Neck:     No nuchal rigidity. Back:     No CVA tenderness. Abdomen:    Soft nontender. Extremities:    No pretibial pitting edema, no calf muscle tenderness. Skin:     No rashes, no lesions. BP: 122/82 Weight: 224 lb (101.6 kg)   Pulse: 98     Body mass index is 39.68 kg/m². Urine dipstick:  Glucose : Negative   Albumin:  Negative       An ultrasound evaluation was done in our office today. Please refer to the enclosed copy of the ultrasound report for further information. Chart and Lab Work Review:  I reviewed with the patient the result of the:    · Elevated 1 hour 50 g Glucola test (positive screen for gestational diabetes)  Results for Jhoana Benoit (MRN [id number])    Ref.  Range 2019 15:00 Findings:       The right lobe of thyroid measures 5.4 x 3.3 x 4.4 cm       There is a mass in the right lobe of thyroid measuring 4 x 2.7 x 2.2   cm. This is not significantly changed       The left lobe of thyroid measures 2.9 x 0.8 x 0.8 cm.       There is a hypoechoic nodule measuring 4 x 3 x 4 mm.           Impression   Dominant proximally 4 cm mass with multiple cystic components in the   right lobe of the thyroid when compared to previous study this is not   significantly changed       TI-RADS 3:  Probably benign nodules (< 5% risk of malignancy)         IMPRESSION:  1. A  35w0d  intrauterine gestation. 2. Hyperthyroidism. 3. Maternal multinodular goiter on treatment with methimazole, 5 mg p.o. per day  4. Maternal morbid obesity, with elevated 1 hour 50 g Glucola test..  5. Previous Low-lying placenta, resolved. 6. Chlamydia infection during pregnancy(treated), she said that her boyfriend was also treated. 7. UTI during pregnancy (treated)    RECOMMENDATIONS/PLAN:  I discussed with the patient the following points:    1. The benefits and limitations of ultrasound in prenatal diagnosis. Some defects might not always be seen by ultrasound. Estimated incidence of these defects in the general population is 2- 4%. 2. No structural  anomalies are noted. Only genetic amniocentesis can rule out fetal chromosome anomalies. Normal ultrasound does not. 3. The size of her baby is appropriate for gestational age. 4. Obesity is assosiated with an increased risk of developing gestational diabetes, and disturbance in the growth of her baby, such as Large for dates and small for Dates. Gestational diabetes should be ruled out with a 2-hour glucose tolerance test (ordered today). 5. Hyperthyroidism was confirmed on repeat testing in our office. 6. Treatment with methimazole is necessary to prevent development of thyroid crisis, and adverse fetal outcomes. 7. The two most serious maternal complications of untreated hyperthyroidism are heart failure and thyroid storm, a medical emergency associated with a maternal mortality rate of 25% even with appropriate management. 8. Hyperthyroidism  is associated with adverse outcomes including miscarriage, growth restriction,  labor, placental abruption, pregnancy-induced hypertension, preeclampsia, infection, and increased  mortality. 9. Maternal  Chlamydia infection increases the likelihood of PPROM and  delivery, and serious infections in the  resulting in severe life threatening pneumonia, meningitis, blindness and mental retardation. She should abstain from sexual relations for remainder of pregnancy to prevent another infection with a sexually transmitted disease. .  10. Fetal well-being was confirmed today. The amount of fluid around baby is normal.  The Biophysical profile score of 8/8 is reassuring, and the umbilical artery Doppler studies are normal.  11. She should monitor fetal well-being at home by counting movements after dinner. Her baby should  move 10 times in 2 hours; otherwise, she should call your office immediately. She is also to call, if she develops any headaches, blurred vision, abdominal pain, bleeding, or spotting, which are signs of preeclampsia. 12. She is to continue to follow with you in your office for ongoing obstetric care. 13. She should be monitored with nonstress test every 3 to 4 days and with biophysical profile once a week for remainder of pregnancy. Thank you again, doctor, for allowing us to be of service to your patient. If I can be of further assistance, please do not hesitate to call.       Sincerely,        Ish Nazario M.D., [address] [facility]    Current encounter billing:  [place] OFFICE OUTPATIENT VISIT 15 MINUTES [[id number]]  US OB Follow Up Transabdominal Approach [YMS092 Custom]  US Fetal Biophysical Profile WO Non Stress Testing [12320 Custom] **This report has been created using voice recognition software.  It may contain minor errors     which are inherent in voice recognition technology**

## 2019-06-26 NOTE — PATIENT INSTRUCTIONS
doctor about getting it. It will help protect your  against pertussis infection. In the 36th week, most women have a test for group B streptococcus (GBS). GBS is a common bacteria that can live in the vagina and rectum. It can make your baby sick after birth. If you test positive, you will get antibiotics during labor. The medicine will keep your baby from getting the bacteria. Follow-up care is a key part of your treatment and safety. Be sure to make and go to all appointments, and call your doctor if you are having problems. It's also a good idea to know your test results and keep a list of the medicines you take. How can you care for yourself at home? Learn about pain relief choices  · Pain is different for every woman. Talk with your doctor about your feelings about pain. · You can choose from several types of pain relief. These include medicine or breathing techniques, as well as comfort measures. You can use more than one option. · If you choose to have pain medicine during labor, talk to your doctor about your options. Some medicines lower anxiety and help with some of the pain. Others make your lower body numb so that you won't feel pain. · Be sure to tell your doctor about your pain medicine choice before you start labor or very early in your labor. You may be able to change your mind as labor progresses. · Rarely, a woman is put to sleep by medicine given through a mask or an IV. Labor and delivery  · The first stage of labor has three parts: early, active, and transition. ? Most women have early labor at home. You can stay busy or rest, eat light snacks, drink clear fluids, and start counting contractions. ? When talking during a contraction gets hard, you may be moving to active labor. During active labor, you should head for the hospital if you are not there already. ? You are in active labor when contractions come every 3 to 4 minutes and last about 60 seconds.  Your cervix is opening more rapidly. ? If your water breaks, contractions will come faster and stronger. ? During transition, your cervix is stretching, and contractions are coming more rapidly. ? You may want to push, but your cervix might not be ready. Your doctor will tell you when to push. · The second stage starts when your cervix is completely opened and you are ready to push. ? Contractions are very strong to push the baby down the birth canal.  ? You will feel the urge to push. You may feel like you need to have a bowel movement. ? You may be coached to push with contractions. These contractions will be very strong, but you will not have them as often. You can get a little rest between contractions. ? You may be emotional and irritable. You may not be aware of what is going on around you.  ? One last push, and your baby is born. · The third stage is when a few more contractions push out the placenta. This may take 30 minutes or less. · The fourth stage is the welcome recovery. You may feel overwhelmed with emotions and exhausted but alert. This is a good time to start breastfeeding. Where can you learn more? Go to https://Kommerstate.ru.Katalyst Network. org and sign in to your Desecuritrex account. Enter U988 in the Incentient box to learn more about \"Weeks 34 to 36 of Your Pregnancy: Care Instructions. \"     If you do not have an account, please click on the \"Sign Up Now\" link. Current as of: September 5, 2018  Content Version: 12.0  © 7317-6984 Healthwise, Incorporated. Care instructions adapted under license by [facility] ([facility]). If you have questions about a medical condition or this instruction, always ask your healthcare professional. [place] [address] any warranty or liability for your use of this information.          Patient Education        Learning About When to Call Your Doctor During Pregnancy (After 20 Weeks)  Your Care Instructions  It's common to have concerns about what might be a problem during pregnancy. Although most pregnant women don't have any serious problems, it's important to know when to call your doctor if you have certain symptoms or signs of labor. These are general suggestions. Your doctor may give you some more information about when to call. When to call your doctor (after 20 weeks)  Call 911 anytime you think you may need emergency care. For example, call if:  · You have severe vaginal bleeding. · You have sudden, severe pain in your belly. · You passed out (lost consciousness). · You have a seizure. · You see or feel the umbilical cord. · You think you are about to deliver your baby and can't make it safely to the hospital.  Call your doctor now or seek immediate medical care if:  · You have vaginal bleeding. · You have belly pain. · You have a fever. · You have symptoms of preeclampsia, such as:  ? Sudden swelling of your face, hands, or feet. ? New vision problems (such as dimness or blurring). ? A severe headache. · You have a sudden release of fluid from your vagina. (You think your water broke.)  · You think that you may be in labor. This means that you've had at least 4 contractions within 20 minutes or at least 8 contractions in an hour. · You notice that your baby has stopped moving or is moving much less than normal.  · You have symptoms of a urinary tract infection. These may include:  ? Pain or burning when you urinate. ? A frequent need to urinate without being able to pass much urine. ? Pain in the flank, which is just below the rib cage and above the waist on either side of the back. ? Blood in your urine. Watch closely for changes in your health, and be sure to contact your doctor if:  · You have vaginal discharge that smells bad. · You have skin changes, such as:  ? A rash. ? Itching. ? Yellow color to your skin. · You have other concerns about your pregnancy.   If you have labor signs at 37 weeks or more  If you have signs of labor at 40 weeks or more, your doctor may tell you to call when your labor becomes more active. Symptoms of active labor include:  · Contractions that are regular. · Contractions that are less than 5 minutes apart. · Contractions that are hard to talk through. Follow-up care is a key part of your treatment and safety. Be sure to make and go to all appointments, and call your doctor if you are having problems. It's also a good idea to know your test results and keep a list of the medicines you take. Where can you learn more? Go to https://chpejessyeweb.mPortico. org and sign in to your 500Friends account. Enter  in the Dine perfect box to learn more about \"Learning About When to Call Your Doctor During Pregnancy (After 20 Weeks). \"     If you do not have an account, please click on the \"Sign Up Now\" link. Current as of: September 5, 2018  Content Version: 12.0  © 2096-1842 YouNoodle. Care instructions adapted under license by [facility] ([facility]). If you have questions about a medical condition or this instruction, always ask your healthcare professional. [place] [address] any warranty or liability for your use of this information. Patient Education        Counting Your Baby's Kicks: Care Instructions  Your Care Instructions    Counting your baby's kicks is one way your doctor can tell that your baby is healthy. Most women--especially in a first pregnancy--feel their baby move for the first time between 16 and 22 weeks. The movement may feel like flutters rather than kicks. Your baby may move more at certain times of the day. When you are active, you may notice less kicking than when you are resting. At your prenatal visits, your doctor will ask whether the baby is active. In your last trimester, your doctor may ask you to count the number of times you feel your baby move. Follow-up care is a key part of your treatment and safety.  Be sure to make and go to all

## 2019-06-26 NOTE — PROGRESS NOTES
19     RE:  Severino Honeycutt   : 1995   AGE: 21 y.o. REFERRING PHYSICIAN:  Maira Mcmanus M.D. Dear   Mrs. Severino Honeycutt a 21 y.o.  Cindy Whittaker  is seen today on follow up in our office. REASON FOR APPOINTMENT:  Follow up on a pregnant patient with hyperthyroidism, nodular goiter, obesity and history of chlamydia infection early during pregnancy. MEDICATIONS:    Prenatal Vitamins once per day   Methimazole 5 mg p.o. per day. Ferrous sulfate 325 mg p.o. per day. INTERVAL HISTORY:  Mrs Severino Honeycutt was evaluated in the emergency room of [facility] in [facility] on 6/3/2019 with a neck mass. She was diagnosed to have a multinodular goiter and hyperthyroidism, was seen by endocrinologist and is receiving treatment with methimazole 500 mg p.o. once per day. When seen today in our office she had no complaints, no palpitations no diarrhea no weight loss. PHYSICAL EXAMINATION:  General Appearance:  Healthy looking, alert, no acute distress. Eyes:     No pallor, no icterus, no photophobia. Ears:     No ear drainage. Nose:     No nasal drainage, no paranasal sinus tenderness. Throat:   Mucosa moist, no oral thrush, no exudate. (5 cm right neck nodule)  Neck:     No nuchal rigidity. Back:     No CVA tenderness. Abdomen:    Soft nontender. Extremities:    No pretibial pitting edema, no calf muscle tenderness. Skin:     No rashes, no lesions. BP: 122/82 Weight: 224 lb (101.6 kg)   Pulse: 98     Body mass index is 39.68 kg/m². Urine dipstick:  Glucose : Negative   Albumin:  Negative       An ultrasound evaluation was done in our office today. Please refer to the enclosed copy of the ultrasound report for further information. Chart and Lab Work Review:  I reviewed with the patient the result of the:    · Elevated 1 hour 50 g Glucola test (positive screen for gestational diabetes)  Results for [facility] (MRN [id number])    Ref.  Range 2019 15:00   Glucose tolerance screen 50g Latest Ref Range: 70 - 140 mg/dL 158 (H)       · Endocrinology consultation (note by Dr. Caleb Gillespie dated 2019):    ASSESSMENT & RECOMMENDATIONS   Marcin Wilkerson, a 21 y.o.-old female seen in for management of following issues       Hyperthyroidism with large thyroid nodules   · Work up consistent with toxic multinodular goiter  · Pt now 36 weeks gestation. · Will Continue Methimazole 5 mg daily, check Level now and adjust dose if needed   · After delivery she will need thyroid uptake and scan and also with such a large thyroid nodule she will need Rt lobectomy at one point after delivery      Bone health/vitD deficiency    · Discussed the effect of hyperthyroidism on bone health  · Encourage taking vitD 2000 iu/day over the counter     Return in about 2 months (around 2019) for Hyperthyroidism .     The above issues were reviewed with the patient who understood and agreed with the plan.     Thank you for allowing us to participate in the care of this patient. Please do not hesitate to contact us with any additional questions.     Diagnosis Orders   1. Hyperthyroidism  TSH without Reflex     T4, Free     T3   2.  Multinodular goiter  TSH without Reflex     T4, Free     T3      Thalia Stewart MD  Endocrinologist, [facility] and Endocrinology   [address] [facility] [address]   Phone: [phone]  Fax: [phone]  ---------------------------------  Electronically signed by Geneva Echeverria MD  on 19 at 1:42 PM       · Thyroid ultrasound that showed a 4 cm nodule:    Read Date Phone Pager   Ary Ilan Zion 3, 2019 [phone]    Radiation Dose Estimates     No radiation information found for this patient   Narrative   Patient MRN: [id number]   : 1995   Age:  21 years   Gender: Female   Order Date: 6/3/2019 5:45 PM   Exam: US HEAD NECK SOFT TISSUE THYROID   Number of Images: 45 views   Indication:   thyroid mass, painful    Comparison: None.       Findings:       The right lobe of thyroid measures 5.4 x 3.3 x 4.4 cm       There is a mass in the right lobe of thyroid measuring 4 x 2.7 x 2.2   cm. This is not significantly changed       The left lobe of thyroid measures 2.9 x 0.8 x 0.8 cm.       There is a hypoechoic nodule measuring 4 x 3 x 4 mm.           Impression   Dominant proximally 4 cm mass with multiple cystic components in the   right lobe of the thyroid when compared to previous study this is not   significantly changed       TI-RADS 3:  Probably benign nodules (< 5% risk of malignancy)         IMPRESSION:  1. A  35w0d  intrauterine gestation. 2. Hyperthyroidism. 3. Maternal multinodular goiter on treatment with methimazole, 5 mg p.o. per day  4. Maternal morbid obesity, with elevated 1 hour 50 g Glucola test..  5. Previous Low-lying placenta, resolved. 6. Chlamydia infection during pregnancy(treated), she said that her boyfriend was also treated. 7. UTI during pregnancy (treated)    RECOMMENDATIONS/PLAN:  I discussed with the patient the following points:    1. The benefits and limitations of ultrasound in prenatal diagnosis. Some defects might not always be seen by ultrasound. Estimated incidence of these defects in the general population is 2- 4%. 2. No structural  anomalies are noted. Only genetic amniocentesis can rule out fetal chromosome anomalies. Normal ultrasound does not. 3. The size of her baby is appropriate for gestational age. 4. Obesity is assosiated with an increased risk of developing gestational diabetes, and disturbance in the growth of her baby, such as Large for dates and small for Dates. Gestational diabetes should be ruled out with a 2-hour glucose tolerance test (ordered today). 5. Hyperthyroidism was confirmed on repeat testing in our office. 6. Treatment with methimazole is necessary to prevent development of thyroid crisis, and adverse fetal outcomes.   7. The two most serious maternal complications of untreated hyperthyroidism are heart failure and thyroid storm, a medical emergency associated with a maternal mortality rate of 25% even with appropriate management. 8. Hyperthyroidism  is associated with adverse outcomes including miscarriage, growth restriction,  labor, placental abruption, pregnancy-induced hypertension, preeclampsia, infection, and increased  mortality. 9. Maternal  Chlamydia infection increases the likelihood of PPROM and  delivery, and serious infections in the  resulting in severe life threatening pneumonia, meningitis, blindness and mental retardation. She should abstain from sexual relations for remainder of pregnancy to prevent another infection with a sexually transmitted disease. .  10. Fetal well-being was confirmed today. The amount of fluid around baby is normal.  The Biophysical profile score of 8/8 is reassuring, and the umbilical artery Doppler studies are normal.  11. She should monitor fetal well-being at home by counting movements after dinner. Her baby should  move 10 times in 2 hours; otherwise, she should call your office immediately. She is also to call, if she develops any headaches, blurred vision, abdominal pain, bleeding, or spotting, which are signs of preeclampsia. 12. She is to continue to follow with you in your office for ongoing obstetric care. 13. She should be monitored with nonstress test every 3 to 4 days and with biophysical profile once a week for remainder of pregnancy. Thank you again, doctor, for allowing us to be of service to your patient. If I can be of further assistance, please do not hesitate to call. Sincerely,        Carissa Holland M.D., [address] [facility]    Current encounter billing:  [place] OFFICE OUTPATIENT VISIT 15 MINUTES [[id number]]  US OB Follow Up Transabdominal Approach [XTV510 Custom]  US Fetal Biophysical Profile WO Non Stress Testing [25365 Custom]    **This report has been created using voice recognition software.  It may contain minor errors     which are inherent in voice recognition technology**

## 2019-06-27 ENCOUNTER — TELEPHONE (OUTPATIENT)
Dept: OBGYN | Age: 24
End: 2019-06-27

## 2019-06-27 ENCOUNTER — HOSPITAL ENCOUNTER (OUTPATIENT)
Age: 24
Setting detail: OBSERVATION
Discharge: HOME | End: 2019-06-28
Attending: OBSTETRICS & GYNECOLOGY | Admitting: OBSTETRICS & GYNECOLOGY
Payer: COMMERCIAL

## 2019-06-27 VITALS
TEMPERATURE: 98.7 F | HEIGHT: 64 IN | SYSTOLIC BLOOD PRESSURE: 129 MMHG | DIASTOLIC BLOOD PRESSURE: 73 MMHG | BODY MASS INDEX: 38.24 KG/M2 | RESPIRATION RATE: 16 BRPM | WEIGHT: 224 LBS | HEART RATE: 104 BPM

## 2019-06-27 PROBLEM — O42.90 ANTEPARTUM PREMATURE RUPTURE OF MEMBRANE: Status: ACTIVE | Noted: 2019-06-27

## 2019-06-27 LAB — AMNISURE PATIENT RESULT: NEGATIVE

## 2019-06-27 PROCEDURE — 81001 URINALYSIS AUTO W/SCOPE: CPT

## 2019-06-27 ASSESSMENT — PAIN SCALES - GENERAL: PAINLEVEL_OUTOF10: 4

## 2019-06-27 ASSESSMENT — PAIN DESCRIPTION - ORIENTATION: ORIENTATION: LOWER

## 2019-06-27 ASSESSMENT — PAIN DESCRIPTION - LOCATION: LOCATION: ABDOMEN

## 2019-06-27 ASSESSMENT — PAIN DESCRIPTION - PROGRESSION: CLINICAL_PROGRESSION: GRADUALLY WORSENING

## 2019-06-27 ASSESSMENT — PAIN DESCRIPTION - ONSET: ONSET: GRADUAL

## 2019-06-27 ASSESSMENT — PAIN DESCRIPTION - FREQUENCY: FREQUENCY: INTERMITTENT

## 2019-06-27 ASSESSMENT — PAIN DESCRIPTION - DESCRIPTORS: DESCRIPTORS: SHARP

## 2019-06-27 ASSESSMENT — PAIN DESCRIPTION - PAIN TYPE: TYPE: ACUTE PAIN

## 2019-06-28 ENCOUNTER — TELEPHONE (OUTPATIENT)
Dept: OBGYN CLINIC | Age: 24
End: 2019-06-28

## 2019-06-28 LAB
BACTERIA: ABNORMAL /HPF
BILIRUBIN URINE: NEGATIVE
BLOOD, URINE: NEGATIVE
CLARITY: ABNORMAL
COLOR: YELLOW
CRYSTALS, UA: ABNORMAL
EPITHELIAL CELLS, UA: ABNORMAL /HPF
GLUCOSE URINE: NEGATIVE MG/DL
KETONES, URINE: NEGATIVE MG/DL
LEUKOCYTE ESTERASE, URINE: ABNORMAL
NITRITE, URINE: NEGATIVE
PH UA: 6 (ref 5–9)
PROTEIN UA: NEGATIVE MG/DL
RBC UA: ABNORMAL /HPF (ref 0–2)
SPECIFIC GRAVITY UA: >=1.03 (ref 1–1.03)
UROBILINOGEN, URINE: 1 E.U./DL
WBC UA: ABNORMAL /HPF (ref 0–5)

## 2019-06-28 PROCEDURE — 84112 EVAL AMNIOTIC FLUID PROTEIN: CPT

## 2019-06-28 PROCEDURE — G0378 HOSPITAL OBSERVATION PER HR: HCPCS

## 2019-06-28 NOTE — PROGRESS NOTES
Discharge instructions reviewed with pt. All questions answered. She verbalized understanding. Pt leaves unit ambulatory with all personal belongings.

## 2019-06-28 NOTE — PROGRESS NOTES
Fetal monitoring reassuring. FHR baseline 140  Moderate variability  Accelerations presents  Decelerations not present  Contractions q5 min but patient is feeling a \"tummy ache\" at most    Cervical check  Dilation 2 cm external, 1 cm internal   50% effacement  -3 station    Amnisure negative    Urinalysis unremarkable except small leuk, likely contamination rather than indication of UTI. Pt currently asymptomatic. Results reassuring. Membranes likely intact. [id number] M[address]ya Neil for discharge. Follow up with Cent[facility]. Call with any concerns.      Jessica Hutchins MD  06/28/19  12:40 AM

## 2019-06-28 NOTE — TELEPHONE ENCOUNTER
Notified patient she has been scheduled for NST's/bpp appointments at [place] [address] through end of August.    She should have NST at [facility] on the [facility] that she has centering appointments as long as there is a physician on those days and to ask about this at her appointment there on July 1st.     LM on VM at [facility] to add NST to her Monday appointments if possible and if not to notify MFM so she can be scheduled at our office.

## 2019-07-01 ENCOUNTER — HOSPITAL ENCOUNTER (OUTPATIENT)
Age: 24
Discharge: HOME | End: 2019-07-03
Payer: COMMERCIAL

## 2019-07-01 ENCOUNTER — ROUTINE PRENATAL (OUTPATIENT)
Dept: OBGYN | Age: 24
End: 2019-07-01
Payer: COMMERCIAL

## 2019-07-01 ENCOUNTER — TELEPHONE (OUTPATIENT)
Dept: OBGYN | Age: 24
End: 2019-07-01

## 2019-07-01 VITALS
WEIGHT: 224 LBS | BODY MASS INDEX: 38.45 KG/M2 | DIASTOLIC BLOOD PRESSURE: 66 MMHG | HEART RATE: 85 BPM | SYSTOLIC BLOOD PRESSURE: 122 MMHG

## 2019-07-01 DIAGNOSIS — O99.283 HYPERTHYROIDISM AFFECTING PREGNANCY IN THIRD TRIMESTER: ICD-10-CM

## 2019-07-01 DIAGNOSIS — Z34.93 PRENATAL CARE IN THIRD TRIMESTER: Primary | ICD-10-CM

## 2019-07-01 DIAGNOSIS — E05.90 HYPERTHYROIDISM AFFECTING PREGNANCY IN THIRD TRIMESTER: ICD-10-CM

## 2019-07-01 DIAGNOSIS — Z34.93 PRENATAL CARE IN THIRD TRIMESTER: ICD-10-CM

## 2019-07-01 LAB
GLUCOSE URINE, POC: NORMAL
PROTEIN UA: NEGATIVE

## 2019-07-01 PROCEDURE — G8427 DOCREV CUR MEDS BY ELIG CLIN: HCPCS | Performed by: NURSE PRACTITIONER

## 2019-07-01 PROCEDURE — 81002 URINALYSIS NONAUTO W/O SCOPE: CPT | Performed by: NURSE PRACTITIONER

## 2019-07-01 PROCEDURE — 1036F TOBACCO NON-USER: CPT | Performed by: NURSE PRACTITIONER

## 2019-07-01 PROCEDURE — 87491 CHLMYD TRACH DNA AMP PROBE: CPT

## 2019-07-01 PROCEDURE — G8417 CALC BMI ABV UP PARAM F/U: HCPCS | Performed by: NURSE PRACTITIONER

## 2019-07-01 PROCEDURE — 87081 CULTURE SCREEN ONLY: CPT

## 2019-07-01 PROCEDURE — 59025 FETAL NON-STRESS TEST: CPT | Performed by: NURSE PRACTITIONER

## 2019-07-01 PROCEDURE — 99212 OFFICE O/P EST SF 10 MIN: CPT | Performed by: NURSE PRACTITIONER

## 2019-07-01 PROCEDURE — 87591 N.GONORRHOEAE DNA AMP PROB: CPT

## 2019-07-01 PROCEDURE — 99213 OFFICE O/P EST LOW 20 MIN: CPT | Performed by: NURSE PRACTITIONER

## 2019-07-01 NOTE — PROGRESS NOTES
Here for NST before her Centering pregnancy session today for her hyperthyroidism. NON STRESS TEST INTERPRETATION    19    RE:  Sparkle Tate   : 1995   AGE: 21 y.o. GESTATIONAL AGE:  35w5d    DIAGNOSIS:   Hyperthyroidism     35 week IUP    INDICATION:  Hyperthyroidism    TIME ON:  13:00      TIME OFF:  13:30      RESULT:   REACTIVE      FHR Baseline Rate:   150 bpm    PERIODIC CHANGES:    · Accelerations present, variability moderate, mild variable deceleration with a mild contraction./    COMMENTS:      She is to continue having NST's every 3-4 days, and BPP with umbilical artery doppler studies once per week. Has NST scheduled Friday at [facility] and will schedule back here next Monday.         Edson Suarez MD

## 2019-07-01 NOTE — PROGRESS NOTES
Here for centering pregnancy and NST. Participated well. PLACED ON EFM at 1:00pm for nst.  Denies bleeding leaking of fluid or cramping. Patient states her NST'S usually shows some contractions but she does not feel them. nst finished at 1330 and taken off EFM. nst reviewed by dr Socrates Joy see his notes. States NST reactive. Discharge instructions given by hyun cuellar cnp. Specimen urine clean catch obtained for GC and chlamydia and sent to lab. Specimen for GBS obtained and sent to lab.

## 2019-07-04 ENCOUNTER — HOSPITAL ENCOUNTER (OUTPATIENT)
Age: 24
Discharge: HOME | End: 2019-07-04
Attending: OBSTETRICS & GYNECOLOGY | Admitting: OBSTETRICS & GYNECOLOGY
Payer: COMMERCIAL

## 2019-07-04 VITALS
TEMPERATURE: 98.5 F | WEIGHT: 224 LBS | RESPIRATION RATE: 16 BRPM | HEIGHT: 64 IN | BODY MASS INDEX: 38.24 KG/M2 | DIASTOLIC BLOOD PRESSURE: 63 MMHG | SYSTOLIC BLOOD PRESSURE: 115 MMHG | HEART RATE: 98 BPM

## 2019-07-04 PROBLEM — O36.8190 ABSENCE OF FETAL MOVEMENT: Status: ACTIVE | Noted: 2019-07-04

## 2019-07-04 LAB — GROUP B STREP CULTURE: NORMAL

## 2019-07-04 PROCEDURE — 99211 OFF/OP EST MAY X REQ PHY/QHP: CPT

## 2019-07-04 PROCEDURE — 59025 FETAL NON-STRESS TEST: CPT

## 2019-07-04 PROCEDURE — G0378 HOSPITAL OBSERVATION PER HR: HCPCS

## 2019-07-04 PROCEDURE — G0379 DIRECT REFER HOSPITAL OBSERV: HCPCS

## 2019-07-05 ENCOUNTER — ROUTINE PRENATAL (OUTPATIENT)
Dept: OBGYN CLINIC | Age: 24
End: 2019-07-05
Payer: COMMERCIAL

## 2019-07-05 VITALS
WEIGHT: 224.4 LBS | BODY MASS INDEX: 38.31 KG/M2 | HEIGHT: 64 IN | SYSTOLIC BLOOD PRESSURE: 114 MMHG | DIASTOLIC BLOOD PRESSURE: 72 MMHG | HEART RATE: 92 BPM

## 2019-07-05 DIAGNOSIS — O99.283 HYPERTHYROIDISM AFFECTING PREGNANCY IN THIRD TRIMESTER: ICD-10-CM

## 2019-07-05 DIAGNOSIS — Z3A.36 36 WEEKS GESTATION OF PREGNANCY: ICD-10-CM

## 2019-07-05 DIAGNOSIS — E05.90 HYPERTHYROIDISM AFFECTING PREGNANCY IN THIRD TRIMESTER: ICD-10-CM

## 2019-07-05 DIAGNOSIS — E04.2 MULTINODULAR GOITER: ICD-10-CM

## 2019-07-05 LAB
C. TRACHOMATIS DNA ,URINE: NEGATIVE
N. GONORRHOEAE DNA, URINE: NEGATIVE
SOURCE: NORMAL

## 2019-07-05 PROCEDURE — G8427 DOCREV CUR MEDS BY ELIG CLIN: HCPCS | Performed by: OBSTETRICS & GYNECOLOGY

## 2019-07-05 PROCEDURE — 76818 FETAL BIOPHYS PROFILE W/NST: CPT | Performed by: OBSTETRICS & GYNECOLOGY

## 2019-07-05 PROCEDURE — 81002 URINALYSIS NONAUTO W/O SCOPE: CPT | Performed by: OBSTETRICS & GYNECOLOGY

## 2019-07-05 PROCEDURE — 99214 OFFICE O/P EST MOD 30 MIN: CPT | Performed by: OBSTETRICS & GYNECOLOGY

## 2019-07-05 PROCEDURE — G8417 CALC BMI ABV UP PARAM F/U: HCPCS | Performed by: OBSTETRICS & GYNECOLOGY

## 2019-07-05 PROCEDURE — 99211 OFF/OP EST MAY X REQ PHY/QHP: CPT | Performed by: OBSTETRICS & GYNECOLOGY

## 2019-07-05 PROCEDURE — 1036F TOBACCO NON-USER: CPT | Performed by: OBSTETRICS & GYNECOLOGY

## 2019-07-05 NOTE — PATIENT INSTRUCTIONS
more rapidly. ? If your water breaks, contractions will come faster and stronger. ? During transition, your cervix is stretching, and contractions are coming more rapidly. ? You may want to push, but your cervix might not be ready. Your doctor will tell you when to push. · The second stage starts when your cervix is completely opened and you are ready to push. ? Contractions are very strong to push the baby down the birth canal.  ? You will feel the urge to push. You may feel like you need to have a bowel movement. ? You may be coached to push with contractions. These contractions will be very strong, but you will not have them as often. You can get a little rest between contractions. ? You may be emotional and irritable. You may not be aware of what is going on around you.  ? One last push, and your baby is born. · The third stage is when a few more contractions push out the placenta. This may take 30 minutes or less. · The fourth stage is the welcome recovery. You may feel overwhelmed with emotions and exhausted but alert. This is a good time to start breastfeeding. Where can you learn more? Go to [email or web address]. org and sign in to your [employer] account. Enter U518 in the [facility] box to learn more about \"Weeks 34 to 36 of Your Pregnancy: Care Instructions. \"     If you do not have an account, please click on the \"Sign Up Now\" link. Current as of: September 5, 2018  Content Version: 12.0  © 6889-7251 Healthwise, Incorporated. Care instructions adapted under license by [facility] [address] If you have questions about a medical condition or this instruction, always ask your healthcare professional. [place] [address] any warranty or liability for your use of this information.          Patient Education        Learning About When to Call Your Doctor During Pregnancy (After 20 Weeks)  Your Care Instructions  It's common to have concerns about what might be a

## 2019-07-05 NOTE — LETTER
19    MD Dawn BatistaHenry [facility] Hospitalbrandon [facility]  [place], [address] [place] [address]     RE:  Chari Montelongo  : 1995   AGE: 21 y.o. This report has been created using voice recognition software. It may contain errors which are inherent in voice recognition technology. Dear Dr. Geneva Ellis:    I saw your patient Flores Bang today for the following indications:    Patient Active Problem List   Diagnosis    Low grade squamous intraepithelial lesion (LGSIL) at risk for high grade squamous intraepithelial lesion (HGSIL) on cytologic smear of cervix    Hyperthyroidism affecting pregnancy in third trimester    Multinodular goiter     As you know, your patient is a 21 y.o. female, who is G1(0). She has an Estimated Date of Delivery: 19 based on her previous ultrasound assessment. She is currently 36 weeks to days gestation based on that assessment. The patient follows with Dr. Sydnie Del Toro in our office for evaluation and management secondary to hyperthyroidism in pregnancy with a multinodular goiter. She takes methimazole for management of this problem. Her thyroid function has been controlled using this medication. She is to continue to follow with her endocrinologist, Dr. Marquetta Klinefelter, for ongoing evaluation and management of her multinodular goiter. The patient has had no new problems develop since her last visit. She stated that her fetal movement has been good. She said no vaginal bleeding or leaking of amniotic fluid. A fetal ultrasound assessment was performed. A living raphael intrauterine fetus was identified in the cephalic presentation, with normal fetal heart motion and normal fetal motion noted. The placenta was on the fundal surface of the uterus and grade 1. The amniotic fluid index was 13.2 cm. The estimated fetal weight is at the 70th growth percentile. The biophysical profile and cord Doppler studies were both reassuring.

## 2019-07-05 NOTE — PROGRESS NOTES
Nst completed. Baseline 150 with moderate variabilty+ accelelrations.  irreg ctx noted Reactive per dr Beny Culver

## 2019-07-08 ENCOUNTER — ROUTINE PRENATAL (OUTPATIENT)
Dept: OBGYN | Age: 24
End: 2019-07-08
Payer: COMMERCIAL

## 2019-07-08 VITALS — SYSTOLIC BLOOD PRESSURE: 122 MMHG | DIASTOLIC BLOOD PRESSURE: 66 MMHG | BODY MASS INDEX: 38.79 KG/M2 | WEIGHT: 226 LBS

## 2019-07-08 DIAGNOSIS — O99.283 HYPERTHYROIDISM AFFECTING PREGNANCY IN THIRD TRIMESTER: ICD-10-CM

## 2019-07-08 DIAGNOSIS — Z34.93 PRENATAL CARE IN THIRD TRIMESTER: Primary | ICD-10-CM

## 2019-07-08 DIAGNOSIS — E05.90 HYPERTHYROIDISM AFFECTING PREGNANCY IN THIRD TRIMESTER: ICD-10-CM

## 2019-07-08 LAB
GLUCOSE URINE, POC: NORMAL
PROTEIN UA: NEGATIVE

## 2019-07-08 PROCEDURE — G8417 CALC BMI ABV UP PARAM F/U: HCPCS | Performed by: OBSTETRICS & GYNECOLOGY

## 2019-07-08 PROCEDURE — 1036F TOBACCO NON-USER: CPT | Performed by: OBSTETRICS & GYNECOLOGY

## 2019-07-08 PROCEDURE — 81002 URINALYSIS NONAUTO W/O SCOPE: CPT | Performed by: OBSTETRICS & GYNECOLOGY

## 2019-07-08 PROCEDURE — 59025 FETAL NON-STRESS TEST: CPT | Performed by: OBSTETRICS & GYNECOLOGY

## 2019-07-08 PROCEDURE — 99212 OFFICE O/P EST SF 10 MIN: CPT | Performed by: OBSTETRICS & GYNECOLOGY

## 2019-07-08 PROCEDURE — G8427 DOCREV CUR MEDS BY ELIG CLIN: HCPCS | Performed by: OBSTETRICS & GYNECOLOGY

## 2019-07-08 PROCEDURE — 99213 OFFICE O/P EST LOW 20 MIN: CPT | Performed by: OBSTETRICS & GYNECOLOGY

## 2019-07-11 ENCOUNTER — TELEPHONE (OUTPATIENT)
Dept: OBGYN | Age: 24
End: 2019-07-11

## 2019-07-15 ENCOUNTER — ROUTINE PRENATAL (OUTPATIENT)
Dept: OBGYN | Age: 24
End: 2019-07-15
Payer: COMMERCIAL

## 2019-07-15 ENCOUNTER — HOSPITAL ENCOUNTER (OUTPATIENT)
Age: 24
Discharge: HOME | End: 2019-07-17
Payer: COMMERCIAL

## 2019-07-15 VITALS
WEIGHT: 228 LBS | DIASTOLIC BLOOD PRESSURE: 84 MMHG | BODY MASS INDEX: 39.14 KG/M2 | SYSTOLIC BLOOD PRESSURE: 131 MMHG | HEART RATE: 100 BPM

## 2019-07-15 DIAGNOSIS — Z34.93 PRENATAL CARE IN THIRD TRIMESTER: Primary | ICD-10-CM

## 2019-07-15 DIAGNOSIS — R73.09 ELEVATED GLUCOSE TOLERANCE TEST: ICD-10-CM

## 2019-07-15 DIAGNOSIS — O99.283 HYPERTHYROIDISM AFFECTING PREGNANCY IN THIRD TRIMESTER: ICD-10-CM

## 2019-07-15 DIAGNOSIS — E05.90 HYPERTHYROIDISM AFFECTING PREGNANCY IN THIRD TRIMESTER: ICD-10-CM

## 2019-07-15 LAB
GLUCOSE URINE, POC: NORMAL
HBA1C MFR BLD: 5.3 % (ref 4–5.6)
PROTEIN UA: NEGATIVE

## 2019-07-15 PROCEDURE — 99212 OFFICE O/P EST SF 10 MIN: CPT | Performed by: NURSE PRACTITIONER

## 2019-07-15 PROCEDURE — G8417 CALC BMI ABV UP PARAM F/U: HCPCS | Performed by: NURSE PRACTITIONER

## 2019-07-15 PROCEDURE — G8427 DOCREV CUR MEDS BY ELIG CLIN: HCPCS | Performed by: NURSE PRACTITIONER

## 2019-07-15 PROCEDURE — 59025 FETAL NON-STRESS TEST: CPT | Performed by: NURSE PRACTITIONER

## 2019-07-15 PROCEDURE — 81002 URINALYSIS NONAUTO W/O SCOPE: CPT | Performed by: NURSE PRACTITIONER

## 2019-07-15 PROCEDURE — 36415 COLL VENOUS BLD VENIPUNCTURE: CPT | Performed by: NURSE PRACTITIONER

## 2019-07-15 PROCEDURE — 99213 OFFICE O/P EST LOW 20 MIN: CPT | Performed by: NURSE PRACTITIONER

## 2019-07-15 PROCEDURE — 83036 HEMOGLOBIN GLYCOSYLATED A1C: CPT

## 2019-07-15 PROCEDURE — 59025 FETAL NON-STRESS TEST: CPT | Performed by: OBSTETRICS & GYNECOLOGY

## 2019-07-15 PROCEDURE — 1036F TOBACCO NON-USER: CPT | Performed by: NURSE PRACTITIONER

## 2019-07-18 ENCOUNTER — ROUTINE PRENATAL (OUTPATIENT)
Dept: OBGYN CLINIC | Age: 24
End: 2019-07-18
Payer: COMMERCIAL

## 2019-07-18 VITALS
WEIGHT: 231 LBS | DIASTOLIC BLOOD PRESSURE: 87 MMHG | BODY MASS INDEX: 39.65 KG/M2 | HEART RATE: 83 BPM | SYSTOLIC BLOOD PRESSURE: 118 MMHG

## 2019-07-18 DIAGNOSIS — O98.811 CHLAMYDIA INFECTION AFFECTING PREGNANCY IN FIRST TRIMESTER: ICD-10-CM

## 2019-07-18 DIAGNOSIS — O99.213 OBESITY AFFECTING PREGNANCY IN THIRD TRIMESTER: ICD-10-CM

## 2019-07-18 DIAGNOSIS — O99.283 HYPERTHYROIDISM AFFECTING PREGNANCY IN THIRD TRIMESTER: Primary | ICD-10-CM

## 2019-07-18 DIAGNOSIS — A74.9 CHLAMYDIA INFECTION AFFECTING PREGNANCY IN FIRST TRIMESTER: ICD-10-CM

## 2019-07-18 DIAGNOSIS — E05.90 HYPERTHYROIDISM AFFECTING PREGNANCY IN THIRD TRIMESTER: Primary | ICD-10-CM

## 2019-07-18 DIAGNOSIS — E04.2 MULTINODULAR GOITER: ICD-10-CM

## 2019-07-18 DIAGNOSIS — Z3A.38 38 WEEKS GESTATION OF PREGNANCY: ICD-10-CM

## 2019-07-18 LAB
GLUCOSE URINE, POC: NEGATIVE
PROTEIN UA: NEGATIVE

## 2019-07-18 PROCEDURE — 81002 URINALYSIS NONAUTO W/O SCOPE: CPT | Performed by: OBSTETRICS & GYNECOLOGY

## 2019-07-18 PROCEDURE — 76818 FETAL BIOPHYS PROFILE W/NST: CPT | Performed by: OBSTETRICS & GYNECOLOGY

## 2019-07-18 PROCEDURE — G8417 CALC BMI ABV UP PARAM F/U: HCPCS | Performed by: OBSTETRICS & GYNECOLOGY

## 2019-07-18 PROCEDURE — 99211 OFF/OP EST MAY X REQ PHY/QHP: CPT | Performed by: OBSTETRICS & GYNECOLOGY

## 2019-07-18 PROCEDURE — 76816 OB US FOLLOW-UP PER FETUS: CPT | Performed by: OBSTETRICS & GYNECOLOGY

## 2019-07-18 PROCEDURE — G8427 DOCREV CUR MEDS BY ELIG CLIN: HCPCS | Performed by: OBSTETRICS & GYNECOLOGY

## 2019-07-18 PROCEDURE — 99213 OFFICE O/P EST LOW 20 MIN: CPT | Performed by: OBSTETRICS & GYNECOLOGY

## 2019-07-18 PROCEDURE — 1036F TOBACCO NON-USER: CPT | Performed by: OBSTETRICS & GYNECOLOGY

## 2019-07-18 NOTE — LETTER
19     RE:  Bessy Huff   : 1995   AGE: 21 y.o. REFERRING PHYSICIAN:  Nasir Burnett M.D. Dear   Mrs. Bessy Huff a 21 y.o.  Bella Pemberton  is seen today on follow up in our office. REASON FOR APPOINTMENT:  Follow up on a pregnant patient with hyperthyroidism, nodular goiter, obesity and history of chlamydia infection early during pregnancy. MEDICATIONS:    Prenatal Vitamins once per day   Methimazole 5 mg p.o. per day. Ferrous sulfate 325 mg p.o. per day. Tylenol for ear and tooth pain as needed.,    INTERVAL HISTORY:  Mrs Bessy Huff had an uneventful course of pregnancy since her last visit to our office. When seen today in our office she had no complaints. PHYSICAL EXAMINATION:  General Appearance:  Healthy looking, alert, no acute distress. Eyes:     No pallor, no icterus, no photophobia. Ears:     No ear drainage. Nose:     No nasal drainage, no paranasal sinus tenderness. Throat:   Mucosa moist, no oral thrush, no exudate. (5 cm right neck nodule)  Neck:     No nuchal rigidity. Back:     No CVA tenderness. Abdomen:    Soft nontender. Extremities:    No pretibial pitting edema, no calf muscle tenderness. Skin:     No rashes, no lesions. BP: 118/87 Weight: 231 lb (104.8 kg)   Pulse: 83     Body mass index is 39.65 kg/m². Urine dipstick:  Glucose : Negative   Albumin:  Negative       An ultrasound evaluation was done in our office today. Please refer to the enclosed copy of the ultrasound report for further information. IMPRESSION:  1. A  38w1d  intrauterine gestation. 2. Hyperthyroidism. 3. Maternal multinodular goiter on treatment with methimazole, 5 mg p.o. per day  4. Maternal morbid obesity, with elevated 1 hour 50 g Glucola test..  5. Previous Low-lying placenta, resolved. 6. Chlamydia infection during pregnancy(treated), she said that her boyfriend was also treated. 7. Poor compliance (did not do the glucose tolerance test yet).     RECOMMENDATIONS/PLAN:

## 2019-07-18 NOTE — PROGRESS NOTES
19     RE:  Riya Cooper   : 1995   AGE: 21 y.o. REFERRING PHYSICIAN:  Juvencio Gamino M.D. Dear   Mrs. Riya Cooper a 21 y.o.  Pastora Nova  is seen today on follow up in our office. REASON FOR APPOINTMENT:  Follow up on a pregnant patient with hyperthyroidism, nodular goiter, obesity and history of chlamydia infection early during pregnancy. MEDICATIONS:    Prenatal Vitamins once per day   Methimazole 5 mg p.o. per day. Ferrous sulfate 325 mg p.o. per day. Tylenol for ear and tooth pain as needed.,    INTERVAL HISTORY:  Mrs Riya Cooper had an uneventful course of pregnancy since her last visit to our office. When seen today in our office she had no complaints. PHYSICAL EXAMINATION:  General Appearance:  Healthy looking, alert, no acute distress. Eyes:     No pallor, no icterus, no photophobia. Ears:     No ear drainage. Nose:     No nasal drainage, no paranasal sinus tenderness. Throat:   Mucosa moist, no oral thrush, no exudate. (5 cm right neck nodule)  Neck:     No nuchal rigidity. Back:     No CVA tenderness. Abdomen:    Soft nontender. Extremities:    No pretibial pitting edema, no calf muscle tenderness. Skin:     No rashes, no lesions. BP: 118/87 Weight: 231 lb (104.8 kg)   Pulse: 83     Body mass index is 39.65 kg/m². Urine dipstick:  Glucose : Negative   Albumin:  Negative       An ultrasound evaluation was done in our office today. Please refer to the enclosed copy of the ultrasound report for further information. IMPRESSION:  1. A  38w1d  intrauterine gestation. 2. Hyperthyroidism. 3. Maternal multinodular goiter on treatment with methimazole, 5 mg p.o. per day  4. Maternal morbid obesity, with elevated 1 hour 50 g Glucola test..  5. Previous Low-lying placenta, resolved. 6. Chlamydia infection during pregnancy(treated), she said that her boyfriend was also treated. 7. Poor compliance (did not do the glucose tolerance test yet).     RECOMMENDATIONS/PLAN:  I

## 2019-07-22 ENCOUNTER — ROUTINE PRENATAL (OUTPATIENT)
Dept: OBGYN | Age: 24
End: 2019-07-22
Payer: COMMERCIAL

## 2019-07-22 VITALS
HEART RATE: 83 BPM | WEIGHT: 231 LBS | SYSTOLIC BLOOD PRESSURE: 114 MMHG | DIASTOLIC BLOOD PRESSURE: 78 MMHG | BODY MASS INDEX: 39.65 KG/M2

## 2019-07-22 DIAGNOSIS — E05.90 HYPERTHYROIDISM AFFECTING PREGNANCY IN THIRD TRIMESTER: ICD-10-CM

## 2019-07-22 DIAGNOSIS — O99.283 HYPERTHYROIDISM AFFECTING PREGNANCY IN THIRD TRIMESTER: ICD-10-CM

## 2019-07-22 DIAGNOSIS — Z34.93 PRENATAL CARE IN THIRD TRIMESTER: Primary | ICD-10-CM

## 2019-07-22 PROCEDURE — 99213 OFFICE O/P EST LOW 20 MIN: CPT | Performed by: OBSTETRICS & GYNECOLOGY

## 2019-07-22 PROCEDURE — G8417 CALC BMI ABV UP PARAM F/U: HCPCS | Performed by: OBSTETRICS & GYNECOLOGY

## 2019-07-22 PROCEDURE — G8427 DOCREV CUR MEDS BY ELIG CLIN: HCPCS | Performed by: OBSTETRICS & GYNECOLOGY

## 2019-07-22 PROCEDURE — 1036F TOBACCO NON-USER: CPT | Performed by: OBSTETRICS & GYNECOLOGY

## 2019-07-22 PROCEDURE — 99212 OFFICE O/P EST SF 10 MIN: CPT | Performed by: OBSTETRICS & GYNECOLOGY

## 2019-07-22 PROCEDURE — 59025 FETAL NON-STRESS TEST: CPT | Performed by: OBSTETRICS & GYNECOLOGY

## 2019-07-23 ENCOUNTER — PREP FOR PROCEDURE (OUTPATIENT)
Dept: OBGYN | Age: 24
End: 2019-07-23

## 2019-07-25 ENCOUNTER — HOSPITAL ENCOUNTER (INPATIENT)
Age: 24
LOS: 2 days | Discharge: HOME | DRG: 560 | End: 2019-07-27
Attending: OBSTETRICS & GYNECOLOGY | Admitting: OBSTETRICS & GYNECOLOGY
Payer: COMMERCIAL

## 2019-07-25 ENCOUNTER — ANESTHESIA (OUTPATIENT)
Dept: LABOR AND DELIVERY | Age: 24
DRG: 560 | End: 2019-07-25
Payer: COMMERCIAL

## 2019-07-25 ENCOUNTER — ANESTHESIA EVENT (OUTPATIENT)
Dept: LABOR AND DELIVERY | Age: 24
DRG: 560 | End: 2019-07-25
Payer: COMMERCIAL

## 2019-07-25 ENCOUNTER — APPOINTMENT (OUTPATIENT)
Dept: LABOR AND DELIVERY | Age: 24
DRG: 560 | End: 2019-07-25
Payer: COMMERCIAL

## 2019-07-25 PROBLEM — Z3A.39 39 WEEKS GESTATION OF PREGNANCY: Status: ACTIVE | Noted: 2019-07-25

## 2019-07-25 LAB
ABO/RH: NORMAL
AMPHETAMINE SCREEN, URINE: NOT DETECTED
ANTIBODY SCREEN: NORMAL
BARBITURATE SCREEN URINE: NOT DETECTED
BENZODIAZEPINE SCREEN, URINE: NOT DETECTED
CANNABINOID SCREEN URINE: NOT DETECTED
COCAINE METABOLITE SCREEN URINE: NOT DETECTED
HCT VFR BLD CALC: 36.6 % (ref 34–48)
HEMOGLOBIN: 12.2 G/DL (ref 11.5–15.5)
MCH RBC QN AUTO: 29.3 PG (ref 26–35)
MCHC RBC AUTO-ENTMCNC: 33.3 % (ref 32–34.5)
MCV RBC AUTO: 88 FL (ref 80–99.9)
METHADONE SCREEN, URINE: NOT DETECTED
OPIATE SCREEN URINE: NOT DETECTED
PDW BLD-RTO: 13.6 FL (ref 11.5–15)
PHENCYCLIDINE SCREEN URINE: NOT DETECTED
PLATELET # BLD: 222 E9/L (ref 130–450)
PMV BLD AUTO: 12.2 FL (ref 7–12)
PROPOXYPHENE SCREEN: NOT DETECTED
RBC # BLD: 4.16 E12/L (ref 3.5–5.5)
WBC # BLD: 11 E9/L (ref 4.5–11.5)

## 2019-07-25 PROCEDURE — 36415 COLL VENOUS BLD VENIPUNCTURE: CPT

## 2019-07-25 PROCEDURE — 6370000000 HC RX 637 (ALT 250 FOR IP): Performed by: FAMILY MEDICINE

## 2019-07-25 PROCEDURE — 2580000003 HC RX 258: Performed by: FAMILY MEDICINE

## 2019-07-25 PROCEDURE — 3700000025 EPIDURAL BLOCK: Performed by: ANESTHESIOLOGY

## 2019-07-25 PROCEDURE — 7200000001 HC VAGINAL DELIVERY

## 2019-07-25 PROCEDURE — 80307 DRUG TEST PRSMV CHEM ANLYZR: CPT

## 2019-07-25 PROCEDURE — 86901 BLOOD TYPING SEROLOGIC RH(D): CPT

## 2019-07-25 PROCEDURE — 86850 RBC ANTIBODY SCREEN: CPT

## 2019-07-25 PROCEDURE — 3E033VJ INTRODUCTION OF OTHER HORMONE INTO PERIPHERAL VEIN, PERCUTANEOUS APPROACH: ICD-10-PCS | Performed by: OBSTETRICS & GYNECOLOGY

## 2019-07-25 PROCEDURE — 2500000003 HC RX 250 WO HCPCS: Performed by: ANESTHESIOLOGY

## 2019-07-25 PROCEDURE — 6360000002 HC RX W HCPCS: Performed by: FAMILY MEDICINE

## 2019-07-25 PROCEDURE — 86900 BLOOD TYPING SEROLOGIC ABO: CPT

## 2019-07-25 PROCEDURE — 1220000000 HC SEMI PRIVATE OB R&B

## 2019-07-25 PROCEDURE — 85027 COMPLETE CBC AUTOMATED: CPT

## 2019-07-25 PROCEDURE — 51701 INSERT BLADDER CATHETER: CPT

## 2019-07-25 RX ORDER — SODIUM CHLORIDE, SODIUM LACTATE, POTASSIUM CHLORIDE, CALCIUM CHLORIDE 600; 310; 30; 20 MG/100ML; MG/100ML; MG/100ML; MG/100ML
INJECTION, SOLUTION INTRAVENOUS CONTINUOUS
Status: DISCONTINUED | OUTPATIENT
Start: 2019-07-25 | End: 2019-07-27 | Stop reason: HOSPADM

## 2019-07-25 RX ORDER — SODIUM CHLORIDE 0.9 % (FLUSH) 0.9 %
10 SYRINGE (ML) INJECTION PRN
Status: DISCONTINUED | OUTPATIENT
Start: 2019-07-25 | End: 2019-07-25 | Stop reason: HOSPADM

## 2019-07-25 RX ORDER — LANOLIN 100 %
OINTMENT (GRAM) TOPICAL PRN
Status: DISCONTINUED | OUTPATIENT
Start: 2019-07-25 | End: 2019-07-27 | Stop reason: HOSPADM

## 2019-07-25 RX ORDER — ACETAMINOPHEN 160 MG/5ML
650 SOLUTION ORAL EVERY 6 HOURS PRN
Status: DISCONTINUED | OUTPATIENT
Start: 2019-07-25 | End: 2019-07-26

## 2019-07-25 RX ORDER — ONDANSETRON 2 MG/ML
4 INJECTION INTRAMUSCULAR; INTRAVENOUS EVERY 6 HOURS PRN
Status: DISCONTINUED | OUTPATIENT
Start: 2019-07-25 | End: 2019-07-25 | Stop reason: SDUPTHER

## 2019-07-25 RX ORDER — SODIUM CHLORIDE 0.9 % (FLUSH) 0.9 %
10 SYRINGE (ML) INJECTION PRN
Status: DISCONTINUED | OUTPATIENT
Start: 2019-07-25 | End: 2019-07-27 | Stop reason: HOSPADM

## 2019-07-25 RX ORDER — SODIUM CHLORIDE 0.9 % (FLUSH) 0.9 %
10 SYRINGE (ML) INJECTION EVERY 12 HOURS SCHEDULED
Status: DISCONTINUED | OUTPATIENT
Start: 2019-07-25 | End: 2019-07-27 | Stop reason: HOSPADM

## 2019-07-25 RX ORDER — ONDANSETRON 4 MG/1
8 TABLET, FILM COATED ORAL EVERY 8 HOURS PRN
Status: DISCONTINUED | OUTPATIENT
Start: 2019-07-25 | End: 2019-07-27 | Stop reason: HOSPADM

## 2019-07-25 RX ORDER — ACETAMINOPHEN 325 MG/1
650 TABLET ORAL EVERY 4 HOURS PRN
Status: DISCONTINUED | OUTPATIENT
Start: 2019-07-25 | End: 2019-07-25

## 2019-07-25 RX ORDER — ACETAMINOPHEN 650 MG
TABLET, EXTENDED RELEASE ORAL
Status: COMPLETED
Start: 2019-07-25 | End: 2019-07-25

## 2019-07-25 RX ORDER — NALBUPHINE HCL 10 MG/ML
5 AMPUL (ML) INJECTION EVERY 4 HOURS PRN
Status: DISCONTINUED | OUTPATIENT
Start: 2019-07-25 | End: 2019-07-25 | Stop reason: HOSPADM

## 2019-07-25 RX ORDER — LIDOCAINE HYDROCHLORIDE 10 MG/ML
INJECTION, SOLUTION INFILTRATION; PERINEURAL
Status: COMPLETED
Start: 2019-07-25 | End: 2019-07-25

## 2019-07-25 RX ORDER — DOCUSATE SODIUM 100 MG/1
100 CAPSULE, LIQUID FILLED ORAL 2 TIMES DAILY
Status: DISCONTINUED | OUTPATIENT
Start: 2019-07-25 | End: 2019-07-25

## 2019-07-25 RX ORDER — IBUPROFEN 800 MG/1
800 TABLET ORAL EVERY 8 HOURS PRN
Status: DISCONTINUED | OUTPATIENT
Start: 2019-07-25 | End: 2019-07-25

## 2019-07-25 RX ORDER — METHIMAZOLE 5 MG/1
5 TABLET ORAL DAILY
Status: DISCONTINUED | OUTPATIENT
Start: 2019-07-26 | End: 2019-07-27 | Stop reason: HOSPADM

## 2019-07-25 RX ORDER — EPHEDRINE SULFATE/0.9% NACL/PF 50 MG/5 ML
5 SYRINGE (ML) INTRAVENOUS PRN
Status: DISCONTINUED | OUTPATIENT
Start: 2019-07-25 | End: 2019-07-27 | Stop reason: HOSPADM

## 2019-07-25 RX ORDER — NALOXONE HYDROCHLORIDE 0.4 MG/ML
0.4 INJECTION, SOLUTION INTRAMUSCULAR; INTRAVENOUS; SUBCUTANEOUS PRN
Status: DISCONTINUED | OUTPATIENT
Start: 2019-07-25 | End: 2019-07-25 | Stop reason: HOSPADM

## 2019-07-25 RX ORDER — ONDANSETRON 2 MG/ML
4 INJECTION INTRAMUSCULAR; INTRAVENOUS EVERY 6 HOURS PRN
Status: DISCONTINUED | OUTPATIENT
Start: 2019-07-25 | End: 2019-07-25 | Stop reason: HOSPADM

## 2019-07-25 RX ADMIN — Medication: at 17:44

## 2019-07-25 RX ADMIN — Medication 5 ML: at 10:37

## 2019-07-25 RX ADMIN — IBUPROFEN 800 MG: 800 TABLET, FILM COATED ORAL at 19:40

## 2019-07-25 RX ADMIN — Medication 15 ML/HR: at 10:40

## 2019-07-25 RX ADMIN — Medication 1 MILLI-UNITS/MIN: at 08:13

## 2019-07-25 RX ADMIN — Medication 5 ML: at 10:38

## 2019-07-25 RX ADMIN — SODIUM CHLORIDE, POTASSIUM CHLORIDE, SODIUM LACTATE AND CALCIUM CHLORIDE: 600; 310; 30; 20 INJECTION, SOLUTION INTRAVENOUS at 10:38

## 2019-07-25 RX ADMIN — Medication 5 ML: at 10:35

## 2019-07-25 RX ADMIN — DOCUSATE SODIUM 100 MG: 50 LIQUID ORAL at 21:59

## 2019-07-25 RX ADMIN — Medication 10 ML: at 21:03

## 2019-07-25 RX ADMIN — SODIUM CHLORIDE, POTASSIUM CHLORIDE, SODIUM LACTATE AND CALCIUM CHLORIDE: 600; 310; 30; 20 INJECTION, SOLUTION INTRAVENOUS at 08:15

## 2019-07-25 ASSESSMENT — PAIN DESCRIPTION - DESCRIPTORS
DESCRIPTORS: CRAMPING
DESCRIPTORS: CRAMPING

## 2019-07-25 ASSESSMENT — PAIN SCALES - GENERAL: PAINLEVEL_OUTOF10: 1

## 2019-07-25 ASSESSMENT — LIFESTYLE VARIABLES: SMOKING_STATUS: 0

## 2019-07-25 NOTE — PROGRESS NOTES
Department of Family Medicine   Spontaneous Vaginal Delivery Note  Resident Physician Note      Pre-delivery Diagnosis:  Induced labor    Post-delivery Diagnosis:  Living  infant(s) and Male    Information for the patient's :  Gasper Garcia [[id number]]      Infant Wt:   7 lbs 8 oz     APGARS:     1 min: 9  5 min: 9     Anesthesia:  epidural anesthesia    Delivery Summary:    Induction with pitocin. Patient pushed for about 20 minutes. Vaginal delivery of a male baby at 17:46 on 2019. Baby was crying shortly after delivery and was placed skin to skin on mother and was assessed my nursing staff. Infant was dried, stimulated and received bulb suction of mouth and nose. Cord clamping was delayed by about 45 seconds. Father of the baby cut the cord. Cord gases and cord blood were obtained. Placenta and membranes were complete and normal and were delivered at 17:52. The cervix, vagina and periurethral area were inspected for tears. No lacerations were found needing repair. Perineum: intact    Complications: mild shoulder dystocia that was resolved with McRobert's maneuver and suprapubic pressure. Mother and Tatianna Power are in good condition. Baby was moving all extremities well.       Estimated blood loss: minimal          Condition:  infant stable to general nursery and mother stable    Blood Type and Rh: A POS    Infant Feeding:    breast    Attending present: Dr. Tessa Smith was present for the entire delivery     [place] [id number] Resident PGY 2  19  6:20 PM

## 2019-07-25 NOTE — H&P
Department of Family Medicine  Resident Physician Obstetrics History and Physical        CHIEF COMPLAINT:  Induction of Labor, ROM @ 4AM    HISTORY OF PRESENT ILLNESS:    The patient is a 21 y.o. female , No LMP recorded. Patient is pregnant. ,  at 39w1d. Pt presents for induction of labor. She started feeling LOF at 4 AM this morning. She has not felt any contractions and has not noticed any bleeding. She has had good fetal movement. Prenatal labs: unremarkable  ABO: A (19)  Rh Factor: positive (19)  HIV: Non-reactive (19)  RPR: Non-reactive (19)  Rubella titer: Immune (19)  Hepatitis B surface antigen: non-reactive (19)  C. Trochomatis: positive (19), negative (19), negative (19)  N. Gonorrhoeae: negative (19)  GBS: negative (19)    OB History        1    Para        Term                AB        Living           SAB        TAB        Ectopic        Molar        Multiple        Live Births                    Estimated Due Date: Estimated Date of Delivery: 19      Pregnancy complicated by:     Hyperthyroidism  Pt was seen at centering pregnancy with a painful thyroid nodule. She had previous work up for this in 2017. Julio Amador was found to have thyroid nodules on physical examination in 2017   Thyroid US 2017: Rt lobe heterogeneous nodule  measures 4.2 x 2.3 x 2.8 cm  TSH on 17 was <0.005  She was not treated for hyperthyroidism at that time   Pt complained of painful neck mass at [facility] pregnancy session on 6/3/19. She was sent to the ED for evaluation. Case was reviewed by Dr. Jax Berumen (Endocrinology) and pt was started on methimazole 5 mg daily. She was seen in the Endocrine office. Pt has been doing well on medication. She will need a thyroid uptake can after delivery as well as rt lobectomy.      Elevated blood glucose  19 1hr- mg/dl  Pt didn't have 2 hour test done due to not being able to find the time known allergies. Review of Systems:     REVIEW OF SYSTEMS:          CONSTITUTIONAL :      No fever, no chills   HEENT :                         No headache, no visual changes  CARDIOVASCULAR :    No pain, no palpitations, no edema   RESPIRATORY :            No pain, no shortness of breath   GASTROINTESTINAL : No N/V, no D/C, No abdominal pain  GENITOURINARY :       No dysuria, hematuria and no incontinence   MUSCULOSKELETAL:  No myalgia, No back pain   NEUROLOGICAL :        No history of seizures. BP Readings from Last 6 Encounters:   07/25/19 119/66   07/22/19 114/78   07/18/19 118/87   07/15/19 131/84   07/08/19 122/66   07/05/19 114/72       PHYSICAL EXAM:    /66   Pulse 96   Temp 98.4 °F (36.9 °C) (Oral)   Resp 16   Ht 5' 4\" (1.626 m)   Wt 231 lb (104.8 kg)   BMI 39.65 kg/m²     General appearance:  awake, alert, cooperative, no apparent distress, and appears stated age  Neurologic:  Awake, alert, oriented to name, place and time.    Lungs:  Respirations easy   Heart:  regular rate and rhythm  Abdomen:   soft, gravid, non-tender, No CVA tenderness  Fetal heart rate:  Baseline Heart Rate 140   Variability moderate   present accelerations   No decelerations  Pelvis:  External Genitalia: no lesions  Cervix:  DILATION:  4 cm  EFFACEMENT:   90%  STATION:  -1    Contractions: 5-6 min   Membranes:  clear fluid, Time of rupture 4 AM on 7/25/19    GBS  neg      Impression:  Latent phase of labor     Discussed with Dr. Rashad Armstrong (House Officer)    Plan:    Continued EFM  Start pitocin   I/Os  LR @125 ml/hr  Type and screen   Clear liquid diet  Stadol 1 mg q3h PRN  Discussed epidural- pt agreeable     Yanelis Spear MD  Family Medicine Resident PGY2  07/25/19   9:49 AM

## 2019-07-26 PROBLEM — Z3A.39 39 WEEKS GESTATION OF PREGNANCY: Status: RESOLVED | Noted: 2019-07-25 | Resolved: 2019-07-26

## 2019-07-26 PROBLEM — E05.90 HYPERTHYROIDISM: Status: ACTIVE | Noted: 2019-07-26

## 2019-07-26 PROBLEM — O99.283 HYPERTHYROIDISM AFFECTING PREGNANCY IN THIRD TRIMESTER: Status: RESOLVED | Noted: 2019-07-05 | Resolved: 2019-07-26

## 2019-07-26 PROBLEM — E05.90 HYPERTHYROIDISM AFFECTING PREGNANCY IN THIRD TRIMESTER: Status: RESOLVED | Noted: 2019-07-05 | Resolved: 2019-07-26

## 2019-07-26 PROCEDURE — 6370000000 HC RX 637 (ALT 250 FOR IP): Performed by: FAMILY MEDICINE

## 2019-07-26 PROCEDURE — 1220000000 HC SEMI PRIVATE OB R&B

## 2019-07-26 RX ORDER — ACETAMINOPHEN 160 MG/5ML
650 SOLUTION ORAL EVERY 6 HOURS PRN
Status: DISCONTINUED | OUTPATIENT
Start: 2019-07-26 | End: 2019-07-27 | Stop reason: HOSPADM

## 2019-07-26 RX ADMIN — BENZOCAINE AND LEVOMENTHOL: 200; 5 SPRAY TOPICAL at 03:06

## 2019-07-26 RX ADMIN — IBUPROFEN 400 MG: 200 SUSPENSION ORAL at 08:59

## 2019-07-26 RX ADMIN — Medication: at 10:15

## 2019-07-26 RX ADMIN — IBUPROFEN 400 MG: 200 SUSPENSION ORAL at 23:38

## 2019-07-26 RX ADMIN — DOCUSATE SODIUM 100 MG: 50 LIQUID ORAL at 09:01

## 2019-07-26 RX ADMIN — IBUPROFEN 400 MG: 200 SUSPENSION ORAL at 17:04

## 2019-07-26 RX ADMIN — METHIMAZOLE 5 MG: 5 TABLET ORAL at 10:15

## 2019-07-26 RX ADMIN — ACETAMINOPHEN 650 MG: 650 SOLUTION ORAL at 02:58

## 2019-07-26 ASSESSMENT — PAIN SCALES - GENERAL
PAINLEVEL_OUTOF10: 0
PAINLEVEL_OUTOF10: 6
PAINLEVEL_OUTOF10: 4
PAINLEVEL_OUTOF10: 5
PAINLEVEL_OUTOF10: 5

## 2019-07-26 ASSESSMENT — PAIN DESCRIPTION - DESCRIPTORS: DESCRIPTORS: CRAMPING;ACHING;DISCOMFORT

## 2019-07-26 ASSESSMENT — ENCOUNTER SYMPTOMS
CONSTIPATION: 0
VOMITING: 0
NAUSEA: 0
SHORTNESS OF BREATH: 0
DIARRHEA: 0
ABDOMINAL PAIN: 0

## 2019-07-26 ASSESSMENT — PAIN - FUNCTIONAL ASSESSMENT: PAIN_FUNCTIONAL_ASSESSMENT: ACTIVITIES ARE NOT PREVENTED

## 2019-07-26 ASSESSMENT — PAIN DESCRIPTION - FREQUENCY: FREQUENCY: INTERMITTENT

## 2019-07-26 ASSESSMENT — PAIN DESCRIPTION - LOCATION: LOCATION: ABDOMEN

## 2019-07-26 ASSESSMENT — PAIN DESCRIPTION - ONSET: ONSET: GRADUAL

## 2019-07-26 ASSESSMENT — PAIN DESCRIPTION - PROGRESSION: CLINICAL_PROGRESSION: GRADUALLY WORSENING

## 2019-07-26 ASSESSMENT — PAIN DESCRIPTION - PAIN TYPE: TYPE: ACUTE PAIN

## 2019-07-26 ASSESSMENT — PAIN DESCRIPTION - ORIENTATION: ORIENTATION: LOWER

## 2019-07-26 NOTE — FLOWSHEET NOTE
Mom asleep in bed with baby in arms. Mom awakened and baby placed in crib. Mom informed of the dangers of sleeping with baby in bed.

## 2019-07-26 NOTE — PROGRESS NOTES
Pt up to bathroom, unable to void at this time, dennis care completed. Ice pack on, ambulating well. Bleeding small amount without clots.

## 2019-07-26 NOTE — PROGRESS NOTES
Post-Partum Note    PPD#1     S:  Patient without complaints. Lochia normal.  Ambulating. O: /67   Pulse 105   Temp 98 °F (36.7 °C) (Oral)   Resp 16   Ht 5' 4\" (1.626 m)   Wt 231 lb (104.8 kg)   SpO2 98%   Breastfeeding? Unknown   BMI 39.65 kg/m²               ABD:    Uterus firm, non-tender. EXT:     No Elidia's. LABS:     Hemoglobin/Hematocrit:    Lab Results   Component Value Date    HGB 12.2 07/25/2019    HCT 36.6 07/25/2019       IMP:  1. PPD#1, status-post vaginal delivery            2. Patient Active Problem List   Diagnosis    Low grade squamous intraepithelial lesion (LGSIL) at risk for high grade squamous intraepithelial lesion (HGSIL) on cytologic smear of cervix    Hyperthyroidism affecting pregnancy in third trimester    Multinodular goiter    39 weeks gestation of pregnancy       Plan: 1.   Routine post-partum care

## 2019-07-26 NOTE — FLOWSHEET NOTE
Ambulated to bathroom. Voided QS. Bleeding minimal.   Pericare done with warm water. Refuses ice pack. Up at heron in room and hallway for walk.

## 2019-07-26 NOTE — FLOWSHEET NOTE
Admitted M/B via w/c from L&D. To bed per self with L&D nurse. Moving legs well. Prn adapter intact left wrist.  Site clear. Oriented to room and surroundings. Instructed not to get OOB without assist the first time. Call light in reach. Nourishment served. Voices no c/o discomfort. States she already had Tdap vaccine and has basinette and pac-n-play at home for baby. Instructed only one person over age 25 to stay at night.

## 2019-07-26 NOTE — LACTATION NOTE
Patient reports baby is nursing well. Instructed on normal infant behavior in the first 12-24 hrs, benefits of skin to skin, rooming-in and avoidance of pacifier use until breastfeeding is well established. Reviewed waking techniques and the importance of frequent feedings- 8-12 times/ 24 hrs. Taught how to recognize feeding cues. Reviewed expected urine/stool output. Encouraged to feed infant as often and for as long as the infant wishes to do so. Has electric breast pump at home. Encouraged to call if lactation assistance is needed.

## 2019-07-27 VITALS
DIASTOLIC BLOOD PRESSURE: 75 MMHG | RESPIRATION RATE: 18 BRPM | TEMPERATURE: 98.6 F | SYSTOLIC BLOOD PRESSURE: 110 MMHG | HEART RATE: 91 BPM | BODY MASS INDEX: 39.44 KG/M2 | OXYGEN SATURATION: 98 % | WEIGHT: 231 LBS | HEIGHT: 64 IN

## 2019-07-27 PROBLEM — Z3A.39 39 WEEKS GESTATION OF PREGNANCY: Status: RESOLVED | Noted: 2019-07-25 | Resolved: 2019-07-27

## 2019-07-27 PROCEDURE — 6370000000 HC RX 637 (ALT 250 FOR IP): Performed by: FAMILY MEDICINE

## 2019-07-27 RX ADMIN — METHIMAZOLE 5 MG: 5 TABLET ORAL at 12:13

## 2019-07-27 RX ADMIN — IBUPROFEN 400 MG: 200 SUSPENSION ORAL at 05:42

## 2019-07-27 RX ADMIN — IBUPROFEN 400 MG: 200 SUSPENSION ORAL at 12:10

## 2019-07-27 ASSESSMENT — ENCOUNTER SYMPTOMS
DIARRHEA: 0
COUGH: 0
CONSTIPATION: 0
ABDOMINAL PAIN: 0
NAUSEA: 0
SHORTNESS OF BREATH: 0
VOMITING: 0

## 2019-07-27 ASSESSMENT — PAIN SCALES - GENERAL
PAINLEVEL_OUTOF10: 0
PAINLEVEL_OUTOF10: 4
PAINLEVEL_OUTOF10: 5

## 2019-07-27 ASSESSMENT — PAIN DESCRIPTION - RADICULAR PAIN: RADICULAR_PAIN: ABSENT

## 2019-07-27 NOTE — PLAN OF CARE
Problem: Discharge Planning:  Goal: Discharged to appropriate level of care  Description  Discharged to appropriate level of care  7/27/2019 0338 by Mani Kirkpatrick RN  Outcome: Ongoing  7/26/2019 2149 by Mani Kirkpatrick RN  Outcome: Ongoing     Problem: Constipation:  Goal: Bowel elimination is within specified parameters  Description  Bowel elimination is within specified parameters  7/27/2019 0338 by Mani Kirkpatrick RN  Outcome: Ongoing  7/26/2019 2149 by Mani Kirkpatrick RN  Outcome: Ongoing     Problem: Mood - Altered:  Goal: Mood stable  Description  Mood stable  7/27/2019 0338 by Mani Kirkpatrick RN  Outcome: Ongoing  7/26/2019 2149 by Mani Kirkpatrick RN  Outcome: Ongoing     Problem: Pain:  Description  Pain management should include both nonpharmacologic and pharmacologic interventions.   Goal: Pain level will decrease  Description  Pain level will decrease  Outcome: Ongoing  Goal: Control of acute pain  Description  Control of acute pain  Outcome: Ongoing  Goal: Control of chronic pain  Description  Control of chronic pain  Outcome: Ongoing

## 2019-07-29 ENCOUNTER — TELEPHONE (OUTPATIENT)
Dept: OBGYN CLINIC | Age: 24
End: 2019-07-29

## 2019-08-30 ENCOUNTER — OFFICE VISIT (OUTPATIENT)
Dept: FAMILY MEDICINE CLINIC | Age: 24
End: 2019-08-30
Payer: COMMERCIAL

## 2019-08-30 VITALS
SYSTOLIC BLOOD PRESSURE: 93 MMHG | DIASTOLIC BLOOD PRESSURE: 59 MMHG | OXYGEN SATURATION: 98 % | BODY MASS INDEX: 36.86 KG/M2 | HEART RATE: 87 BPM | RESPIRATION RATE: 18 BRPM | HEIGHT: 63 IN | WEIGHT: 208 LBS

## 2019-08-30 DIAGNOSIS — E05.90 HYPERTHYROIDISM: Primary | ICD-10-CM

## 2019-08-30 PROCEDURE — 1036F TOBACCO NON-USER: CPT | Performed by: FAMILY MEDICINE

## 2019-08-30 PROCEDURE — G8427 DOCREV CUR MEDS BY ELIG CLIN: HCPCS | Performed by: FAMILY MEDICINE

## 2019-08-30 PROCEDURE — 99203 OFFICE O/P NEW LOW 30 MIN: CPT | Performed by: FAMILY MEDICINE

## 2019-08-30 PROCEDURE — G8417 CALC BMI ABV UP PARAM F/U: HCPCS | Performed by: FAMILY MEDICINE

## 2019-08-30 RX ORDER — METHIMAZOLE 5 MG/1
5 TABLET ORAL DAILY
COMMUNITY
End: 2019-10-28 | Stop reason: CLARIF

## 2019-08-30 ASSESSMENT — PATIENT HEALTH QUESTIONNAIRE - PHQ9
SUM OF ALL RESPONSES TO PHQ QUESTIONS 1-9: 0
SUM OF ALL RESPONSES TO PHQ QUESTIONS 1-9: 0
1. LITTLE INTEREST OR PLEASURE IN DOING THINGS: 0
2. FEELING DOWN, DEPRESSED OR HOPELESS: 0
SUM OF ALL RESPONSES TO PHQ9 QUESTIONS 1 & 2: 0

## 2019-08-30 ASSESSMENT — ENCOUNTER SYMPTOMS
COUGH: 0
CONSTIPATION: 0
VOMITING: 0
ABDOMINAL PAIN: 0
NAUSEA: 0
SHORTNESS OF BREATH: 0
DIARRHEA: 0

## 2019-09-27 ENCOUNTER — HOSPITAL ENCOUNTER (OUTPATIENT)
Age: 24
Discharge: HOME | End: 2019-09-29

## 2019-09-27 ENCOUNTER — OFFICE VISIT (OUTPATIENT)
Dept: FAMILY MEDICINE CLINIC | Age: 24
End: 2019-09-27
Payer: COMMERCIAL

## 2019-09-27 VITALS
HEART RATE: 96 BPM | TEMPERATURE: 97.9 F | OXYGEN SATURATION: 98 % | WEIGHT: 208.2 LBS | BODY MASS INDEX: 36.89 KG/M2 | RESPIRATION RATE: 18 BRPM | DIASTOLIC BLOOD PRESSURE: 51 MMHG | SYSTOLIC BLOOD PRESSURE: 99 MMHG | HEIGHT: 63 IN

## 2019-09-27 DIAGNOSIS — J02.9 PHARYNGITIS, UNSPECIFIED ETIOLOGY: Primary | ICD-10-CM

## 2019-09-27 LAB — S PYO AG THROAT QL: NORMAL

## 2019-09-27 PROCEDURE — G8427 DOCREV CUR MEDS BY ELIG CLIN: HCPCS | Performed by: FAMILY MEDICINE

## 2019-09-27 PROCEDURE — 86735 MUMPS ANTIBODY: CPT

## 2019-09-27 PROCEDURE — 86762 RUBELLA ANTIBODY: CPT

## 2019-09-27 PROCEDURE — 1036F TOBACCO NON-USER: CPT | Performed by: FAMILY MEDICINE

## 2019-09-27 PROCEDURE — 86787 VARICELLA-ZOSTER ANTIBODY: CPT

## 2019-09-27 PROCEDURE — 86765 RUBEOLA ANTIBODY: CPT

## 2019-09-27 PROCEDURE — 87880 STREP A ASSAY W/OPTIC: CPT | Performed by: FAMILY MEDICINE

## 2019-09-27 PROCEDURE — 86706 HEP B SURFACE ANTIBODY: CPT

## 2019-09-27 PROCEDURE — 99212 OFFICE O/P EST SF 10 MIN: CPT | Performed by: FAMILY MEDICINE

## 2019-09-27 PROCEDURE — G8417 CALC BMI ABV UP PARAM F/U: HCPCS | Performed by: FAMILY MEDICINE

## 2019-09-27 ASSESSMENT — ENCOUNTER SYMPTOMS
NAUSEA: 0
SHORTNESS OF BREATH: 0
COUGH: 1
SORE THROAT: 1
ABDOMINAL PAIN: 0
CONSTIPATION: 0
DIARRHEA: 0
VOMITING: 0

## 2019-10-01 LAB
HBV SURFACE AB TITR SER: NORMAL {TITER}
MEASLES IMMUNE (IGG): NORMAL
MUMPS AB IGG: NORMAL
RUBELLA ANTIBODY IGG: NORMAL
VARICELLA-ZOSTER VIRUS AB, IGG: NORMAL

## 2019-10-24 ENCOUNTER — TELEPHONE (OUTPATIENT)
Dept: OBGYN | Age: 24
End: 2019-10-24

## 2019-10-26 LAB
ALBUMIN SERPL-MCNC: 3.9 G/DL (ref 3.5–5.2)
ALP BLD-CCNC: 147 U/L (ref 35–104)
ALT SERPL-CCNC: 88 U/L (ref 0–32)
ANION GAP SERPL CALCULATED.3IONS-SCNC: 9 MMOL/L (ref 7–16)
AST SERPL-CCNC: 39 U/L (ref 0–31)
BACTERIA: ABNORMAL /HPF
BILIRUB SERPL-MCNC: 0.7 MG/DL (ref 0–1.2)
BILIRUBIN URINE: NEGATIVE
BLOOD, URINE: NORMAL
BUN BLDV-MCNC: 12 MG/DL (ref 6–20)
CALCIUM SERPL-MCNC: 9.4 MG/DL (ref 8.6–10.2)
CHLORIDE BLD-SCNC: 100 MMOL/L (ref 98–107)
CLARITY: CLEAR
CO2: 27 MMOL/L (ref 22–29)
COLOR: YELLOW
CREAT SERPL-MCNC: 0.4 MG/DL (ref 0.5–1)
EPITHELIAL CELLS, UA: ABNORMAL /HPF
GFR AFRICAN AMERICAN: >60
GFR NON-AFRICAN AMERICAN: >60 ML/MIN/1.73
GLUCOSE BLD-MCNC: 103 MG/DL (ref 74–99)
GLUCOSE URINE: NEGATIVE MG/DL
HCT VFR BLD CALC: 37.6 % (ref 34–48)
HEMOGLOBIN: 12 G/DL (ref 11.5–15.5)
KETONES, URINE: NEGATIVE MG/DL
LACTIC ACID: 1.1 MMOL/L (ref 0.5–2.2)
LEUKOCYTE ESTERASE, URINE: NEGATIVE
LIPASE: 37 U/L (ref 13–60)
MCH RBC QN AUTO: 28.1 PG (ref 26–35)
MCHC RBC AUTO-ENTMCNC: 31.9 % (ref 32–34.5)
MCV RBC AUTO: 88.1 FL (ref 80–99.9)
NITRITE, URINE: NEGATIVE
PDW BLD-RTO: 12.4 FL (ref 11.5–15)
PH UA: 6 (ref 5–9)
PLATELET # BLD: 258 E9/L (ref 130–450)
PMV BLD AUTO: 10.7 FL (ref 7–12)
POTASSIUM SERPL-SCNC: 3.8 MMOL/L (ref 3.5–5)
PROTEIN UA: NEGATIVE MG/DL
RBC # BLD: 4.27 E12/L (ref 3.5–5.5)
RBC UA: ABNORMAL /HPF (ref 0–2)
SODIUM BLD-SCNC: 136 MMOL/L (ref 132–146)
SPECIFIC GRAVITY UA: 1.02 (ref 1–1.03)
TOTAL PROTEIN: 7.1 G/DL (ref 6.4–8.3)
UROBILINOGEN, URINE: 1 E.U./DL
WBC # BLD: 7.5 E9/L (ref 4.5–11.5)
WBC UA: ABNORMAL /HPF (ref 0–5)

## 2019-10-26 PROCEDURE — 83690 ASSAY OF LIPASE: CPT

## 2019-10-26 PROCEDURE — 36415 COLL VENOUS BLD VENIPUNCTURE: CPT

## 2019-10-26 PROCEDURE — 81001 URINALYSIS AUTO W/SCOPE: CPT

## 2019-10-26 PROCEDURE — 83605 ASSAY OF LACTIC ACID: CPT

## 2019-10-26 PROCEDURE — 81025 URINE PREGNANCY TEST: CPT

## 2019-10-26 PROCEDURE — 85027 COMPLETE CBC AUTOMATED: CPT

## 2019-10-26 PROCEDURE — 80053 COMPREHEN METABOLIC PANEL: CPT

## 2019-10-26 ASSESSMENT — PAIN DESCRIPTION - PAIN TYPE: TYPE: ACUTE PAIN

## 2019-10-26 ASSESSMENT — PAIN SCALES - GENERAL: PAINLEVEL_OUTOF10: 8

## 2019-10-26 ASSESSMENT — PAIN DESCRIPTION - FREQUENCY: FREQUENCY: CONTINUOUS

## 2019-10-26 ASSESSMENT — PAIN DESCRIPTION - LOCATION: LOCATION: ABDOMEN

## 2019-10-26 ASSESSMENT — PAIN DESCRIPTION - DESCRIPTORS: DESCRIPTORS: SHARP

## 2019-10-26 ASSESSMENT — PAIN DESCRIPTION - ORIENTATION: ORIENTATION: RIGHT;LEFT;LOWER

## 2019-10-27 ENCOUNTER — APPOINTMENT (OUTPATIENT)
Dept: CT IMAGING | Age: 24
End: 2019-10-27
Payer: COMMERCIAL

## 2019-10-27 ENCOUNTER — HOSPITAL ENCOUNTER (EMERGENCY)
Age: 24
Discharge: HOME | End: 2019-10-27
Attending: EMERGENCY MEDICINE
Payer: COMMERCIAL

## 2019-10-27 VITALS
TEMPERATURE: 98.6 F | HEIGHT: 63 IN | DIASTOLIC BLOOD PRESSURE: 45 MMHG | HEART RATE: 84 BPM | SYSTOLIC BLOOD PRESSURE: 93 MMHG | BODY MASS INDEX: 35.44 KG/M2 | WEIGHT: 200 LBS | RESPIRATION RATE: 18 BRPM | OXYGEN SATURATION: 99 %

## 2019-10-27 DIAGNOSIS — R10.11 RIGHT UPPER QUADRANT ABDOMINAL PAIN: Primary | ICD-10-CM

## 2019-10-27 LAB — HCG(URINE) PREGNANCY TEST: NEGATIVE

## 2019-10-27 PROCEDURE — 74176 CT ABD & PELVIS W/O CONTRAST: CPT

## 2019-10-27 PROCEDURE — 96374 THER/PROPH/DIAG INJ IV PUSH: CPT

## 2019-10-27 PROCEDURE — 6360000002 HC RX W HCPCS: Performed by: STUDENT IN AN ORGANIZED HEALTH CARE EDUCATION/TRAINING PROGRAM

## 2019-10-27 PROCEDURE — 99284 EMERGENCY DEPT VISIT MOD MDM: CPT

## 2019-10-27 PROCEDURE — 96375 TX/PRO/DX INJ NEW DRUG ADDON: CPT

## 2019-10-27 RX ORDER — ONDANSETRON 2 MG/ML
4 INJECTION INTRAMUSCULAR; INTRAVENOUS ONCE
Status: COMPLETED | OUTPATIENT
Start: 2019-10-27 | End: 2019-10-27

## 2019-10-27 RX ORDER — KETOROLAC TROMETHAMINE 30 MG/ML
15 INJECTION, SOLUTION INTRAMUSCULAR; INTRAVENOUS ONCE
Status: COMPLETED | OUTPATIENT
Start: 2019-10-27 | End: 2019-10-27

## 2019-10-27 RX ADMIN — KETOROLAC TROMETHAMINE 15 MG: 30 INJECTION, SOLUTION INTRAMUSCULAR at 02:49

## 2019-10-27 RX ADMIN — ONDANSETRON 4 MG: 2 INJECTION INTRAMUSCULAR; INTRAVENOUS at 02:49

## 2019-10-27 ASSESSMENT — ENCOUNTER SYMPTOMS
SHORTNESS OF BREATH: 0
VOMITING: 1
NAUSEA: 1
DIARRHEA: 0
ABDOMINAL PAIN: 1
CONSTIPATION: 0
COLOR CHANGE: 0
COUGH: 0
WHEEZING: 0

## 2019-10-27 ASSESSMENT — PAIN SCALES - GENERAL: PAINLEVEL_OUTOF10: 8

## 2019-10-28 ENCOUNTER — ROUTINE PRENATAL (OUTPATIENT)
Dept: OBGYN CLINIC | Age: 24
End: 2019-10-28
Payer: COMMERCIAL

## 2019-10-28 VITALS
SYSTOLIC BLOOD PRESSURE: 118 MMHG | DIASTOLIC BLOOD PRESSURE: 76 MMHG | HEIGHT: 63 IN | BODY MASS INDEX: 37.25 KG/M2 | RESPIRATION RATE: 18 BRPM | HEART RATE: 119 BPM | OXYGEN SATURATION: 98 % | WEIGHT: 210.2 LBS

## 2019-10-28 PROCEDURE — 1036F TOBACCO NON-USER: CPT | Performed by: NURSE PRACTITIONER

## 2019-10-28 PROCEDURE — G8417 CALC BMI ABV UP PARAM F/U: HCPCS | Performed by: NURSE PRACTITIONER

## 2019-10-28 PROCEDURE — G8427 DOCREV CUR MEDS BY ELIG CLIN: HCPCS | Performed by: NURSE PRACTITIONER

## 2019-10-28 PROCEDURE — 99212 OFFICE O/P EST SF 10 MIN: CPT | Performed by: NURSE PRACTITIONER

## 2019-10-28 PROCEDURE — G8484 FLU IMMUNIZE NO ADMIN: HCPCS | Performed by: NURSE PRACTITIONER

## 2020-01-16 ENCOUNTER — TELEPHONE (OUTPATIENT)
Dept: ADMINISTRATIVE | Age: 25
End: 2020-01-16

## 2020-02-27 ENCOUNTER — HOSPITAL ENCOUNTER (EMERGENCY)
Age: 25
Discharge: HOME | End: 2020-02-27
Payer: COMMERCIAL

## 2020-02-27 VITALS
OXYGEN SATURATION: 96 % | HEIGHT: 63 IN | TEMPERATURE: 97.5 F | HEART RATE: 102 BPM | BODY MASS INDEX: 35.44 KG/M2 | SYSTOLIC BLOOD PRESSURE: 145 MMHG | WEIGHT: 200 LBS | RESPIRATION RATE: 16 BRPM | DIASTOLIC BLOOD PRESSURE: 84 MMHG

## 2020-02-27 LAB
T3 FREE: 16 PG/ML (ref 2–4.4)
T4 FREE: 3.9 NG/DL (ref 0.93–1.7)
TSH SERPL DL<=0.05 MIU/L-ACNC: <0.01 UIU/ML (ref 0.27–4.2)

## 2020-02-27 PROCEDURE — 84481 FREE ASSAY (FT-3): CPT

## 2020-02-27 PROCEDURE — 84439 ASSAY OF FREE THYROXINE: CPT

## 2020-02-27 PROCEDURE — 84443 ASSAY THYROID STIM HORMONE: CPT

## 2020-02-27 PROCEDURE — 36415 COLL VENOUS BLD VENIPUNCTURE: CPT

## 2020-02-27 PROCEDURE — 6370000000 HC RX 637 (ALT 250 FOR IP): Performed by: PHYSICIAN ASSISTANT

## 2020-02-27 PROCEDURE — 99283 EMERGENCY DEPT VISIT LOW MDM: CPT

## 2020-02-27 RX ORDER — IBUPROFEN 400 MG/1
600 TABLET ORAL ONCE
Status: COMPLETED | OUTPATIENT
Start: 2020-02-27 | End: 2020-02-27

## 2020-02-27 RX ADMIN — IBUPROFEN 600 MG: 400 TABLET, FILM COATED ORAL at 02:13

## 2020-02-27 ASSESSMENT — PAIN SCALES - GENERAL
PAINLEVEL_OUTOF10: 8
PAINLEVEL_OUTOF10: 8

## 2020-02-27 ASSESSMENT — PAIN DESCRIPTION - PAIN TYPE: TYPE: ACUTE PAIN

## 2020-02-27 ASSESSMENT — PAIN DESCRIPTION - LOCATION: LOCATION: THROAT

## 2020-02-27 ASSESSMENT — PAIN DESCRIPTION - DESCRIPTORS: DESCRIPTORS: DISCOMFORT;SHARP

## 2020-02-27 NOTE — ED PROVIDER NOTES
[facility]       Department of Emergency Medicine   ED  Provider Note  Admit Date/RoomTime: 2/27/2020  1:37 AM  ED Room: [address] [place] [address]    Chief Complaint   Pharyngitis (pt has nodule on her thyroid for over a year. states she woke up this AM around 0700 and states it was painful to swallow.  )    History of Present Illness   Source of history provided by:  patient. History/Exam Limitations: none. Char Arenas is a 25 y.o. old female who has a past medical history of:   Past Medical History:   Diagnosis Date    Hyperthyroidism     Seizures ([address]     as young but placed on no treatment    Trauma     presents to the emergency department by private vehicle, for right sore throat pain, which occured a few day(s) prior to arrival. Associated Signs & Symptoms: known thyroid nodule, is getting better. Since onset the symptoms have been worsening. She is drinking plenty of fluids. Exposed To: Streptococcus: no.                              Infectious Mononucleosis:  no.        Symptoms:  Pain:  Yes. Muffled Voice:  No.                            Hoarse:  No.                            Difficulty with Secretions:  No.  Patient has a known right thyroid nodule which she reports they told her was a hot nodule. She reports that the doctor was treating her for her hyperthyroidism while she was pregnant and she was supposed to follow-up after the baby was born for reevaluation because the medicine they put her on she reports was not something they would want to have kept her on. She reports she is no longer taking that medication or any. She denies shortness of breath, chest pain, palpitations, sweating, nausea or vomiting, skin changes. She moved to [facility] recently and so she finds it hard to get to her primary care who was in [facility]. She was seeing an endocrinologist and chart review shows this was Dr. Aditi del rio.   Note reported that she he expected her to have a right

## 2020-03-14 ENCOUNTER — HOSPITAL ENCOUNTER (EMERGENCY)
Age: 25
Discharge: HOME | End: 2020-03-15
Attending: EMERGENCY MEDICINE
Payer: COMMERCIAL

## 2020-03-14 PROCEDURE — 99283 EMERGENCY DEPT VISIT LOW MDM: CPT

## 2020-03-14 ASSESSMENT — PAIN DESCRIPTION - PROGRESSION: CLINICAL_PROGRESSION: GRADUALLY WORSENING

## 2020-03-14 ASSESSMENT — PAIN DESCRIPTION - LOCATION: LOCATION: THROAT

## 2020-03-14 ASSESSMENT — PAIN DESCRIPTION - ONSET: ONSET: PROGRESSIVE

## 2020-03-14 ASSESSMENT — PAIN DESCRIPTION - DESCRIPTORS: DESCRIPTORS: BURNING

## 2020-03-14 ASSESSMENT — PAIN DESCRIPTION - PAIN TYPE: TYPE: ACUTE PAIN

## 2020-03-14 ASSESSMENT — PAIN DESCRIPTION - ORIENTATION: ORIENTATION: MID

## 2020-03-14 ASSESSMENT — PAIN DESCRIPTION - FREQUENCY: FREQUENCY: CONTINUOUS

## 2020-03-14 ASSESSMENT — PAIN SCALES - GENERAL: PAINLEVEL_OUTOF10: 8

## 2020-03-15 VITALS
DIASTOLIC BLOOD PRESSURE: 54 MMHG | RESPIRATION RATE: 20 BRPM | HEART RATE: 100 BPM | WEIGHT: 200 LBS | TEMPERATURE: 99 F | BODY MASS INDEX: 36.8 KG/M2 | HEIGHT: 62 IN | OXYGEN SATURATION: 96 % | SYSTOLIC BLOOD PRESSURE: 96 MMHG

## 2020-03-15 LAB
ALBUMIN SERPL-MCNC: 3.7 G/DL (ref 3.5–5.2)
ALP BLD-CCNC: 138 U/L (ref 35–104)
ALT SERPL-CCNC: 34 U/L (ref 0–32)
ANION GAP SERPL CALCULATED.3IONS-SCNC: 13 MMOL/L (ref 7–16)
AST SERPL-CCNC: 36 U/L (ref 0–31)
BASOPHILS ABSOLUTE: 0.01 E9/L (ref 0–0.2)
BASOPHILS RELATIVE PERCENT: 0.2 % (ref 0–2)
BILIRUB SERPL-MCNC: 0.6 MG/DL (ref 0–1.2)
BUN BLDV-MCNC: 14 MG/DL (ref 6–20)
CALCIUM SERPL-MCNC: 9.2 MG/DL (ref 8.6–10.2)
CHLORIDE BLD-SCNC: 100 MMOL/L (ref 98–107)
CO2: 25 MMOL/L (ref 22–29)
CREAT SERPL-MCNC: 0.6 MG/DL (ref 0.5–1)
EOSINOPHILS ABSOLUTE: 0 E9/L (ref 0.05–0.5)
EOSINOPHILS RELATIVE PERCENT: 0 % (ref 0–6)
GFR AFRICAN AMERICAN: >60
GFR NON-AFRICAN AMERICAN: >60 ML/MIN/1.73
GLUCOSE BLD-MCNC: 89 MG/DL (ref 74–99)
HCG, URINE, POC: NEGATIVE
HCT VFR BLD CALC: 38.4 % (ref 34–48)
HEMOGLOBIN: 12.2 G/DL (ref 11.5–15.5)
IMMATURE GRANULOCYTES #: 0.01 E9/L
IMMATURE GRANULOCYTES %: 0.2 % (ref 0–5)
INFLUENZA A BY PCR: NOT DETECTED
INFLUENZA B BY PCR: NOT DETECTED
LYMPHOCYTES ABSOLUTE: 2.08 E9/L (ref 1.5–4)
LYMPHOCYTES RELATIVE PERCENT: 33.5 % (ref 20–42)
Lab: NORMAL
MCH RBC QN AUTO: 27 PG (ref 26–35)
MCHC RBC AUTO-ENTMCNC: 31.8 % (ref 32–34.5)
MCV RBC AUTO: 85 FL (ref 80–99.9)
MONOCYTES ABSOLUTE: 0.91 E9/L (ref 0.1–0.95)
MONOCYTES RELATIVE PERCENT: 14.7 % (ref 2–12)
NEGATIVE QC PASS/FAIL: NORMAL
NEUTROPHILS ABSOLUTE: 3.2 E9/L (ref 1.8–7.3)
NEUTROPHILS RELATIVE PERCENT: 51.4 % (ref 43–80)
PDW BLD-RTO: 12.7 FL (ref 11.5–15)
PLATELET # BLD: 188 E9/L (ref 130–450)
PMV BLD AUTO: 11.4 FL (ref 7–12)
POSITIVE QC PASS/FAIL: NORMAL
POTASSIUM SERPL-SCNC: 4.8 MMOL/L (ref 3.5–5)
RBC # BLD: 4.52 E12/L (ref 3.5–5.5)
SODIUM BLD-SCNC: 138 MMOL/L (ref 132–146)
STREP GRP A PCR: NEGATIVE
T4 TOTAL: 12.2 MCG/DL (ref 4.5–11.7)
TOTAL PROTEIN: 7 G/DL (ref 6.4–8.3)
TSH SERPL DL<=0.05 MIU/L-ACNC: 0.01 UIU/ML (ref 0.27–4.2)
WBC # BLD: 6.2 E9/L (ref 4.5–11.5)

## 2020-03-15 PROCEDURE — 87880 STREP A ASSAY W/OPTIC: CPT

## 2020-03-15 PROCEDURE — 6370000000 HC RX 637 (ALT 250 FOR IP): Performed by: NURSE PRACTITIONER

## 2020-03-15 PROCEDURE — 2580000003 HC RX 258: Performed by: NURSE PRACTITIONER

## 2020-03-15 PROCEDURE — 96374 THER/PROPH/DIAG INJ IV PUSH: CPT

## 2020-03-15 PROCEDURE — 84443 ASSAY THYROID STIM HORMONE: CPT

## 2020-03-15 PROCEDURE — 6360000002 HC RX W HCPCS: Performed by: EMERGENCY MEDICINE

## 2020-03-15 PROCEDURE — 84436 ASSAY OF TOTAL THYROXINE: CPT

## 2020-03-15 PROCEDURE — 85025 COMPLETE CBC W/AUTO DIFF WBC: CPT

## 2020-03-15 PROCEDURE — 87502 INFLUENZA DNA AMP PROBE: CPT

## 2020-03-15 PROCEDURE — 80053 COMPREHEN METABOLIC PANEL: CPT

## 2020-03-15 RX ORDER — 0.9 % SODIUM CHLORIDE 0.9 %
1000 INTRAVENOUS SOLUTION INTRAVENOUS ONCE
Status: COMPLETED | OUTPATIENT
Start: 2020-03-15 | End: 2020-03-15

## 2020-03-15 RX ORDER — OSELTAMIVIR PHOSPHATE 6 MG/ML
75 FOR SUSPENSION ORAL 2 TIMES DAILY
Qty: 125 ML | Refills: 0 | Status: SHIPPED | OUTPATIENT
Start: 2020-03-15 | End: 2021-07-27

## 2020-03-15 RX ORDER — KETOROLAC TROMETHAMINE 30 MG/ML
15 INJECTION, SOLUTION INTRAMUSCULAR; INTRAVENOUS ONCE
Status: COMPLETED | OUTPATIENT
Start: 2020-03-15 | End: 2020-03-15

## 2020-03-15 RX ORDER — ACETAMINOPHEN 160 MG/5ML
650 SUSPENSION, ORAL (FINAL DOSE FORM) ORAL EVERY 6 HOURS PRN
Qty: 240 ML | Refills: 3 | Status: SHIPPED | OUTPATIENT
Start: 2020-03-15 | End: 2021-07-27

## 2020-03-15 RX ORDER — IBUPROFEN 800 MG/1
800 TABLET ORAL ONCE
Status: DISCONTINUED | OUTPATIENT
Start: 2020-03-15 | End: 2020-03-15

## 2020-03-15 RX ORDER — ACETAMINOPHEN 500 MG
1000 TABLET ORAL ONCE
Status: COMPLETED | OUTPATIENT
Start: 2020-03-15 | End: 2020-03-15

## 2020-03-15 RX ORDER — BROMPHENIRAMINE MALEATE, PSEUDOEPHEDRINE HYDROCHLORIDE, AND DEXTROMETHORPHAN HYDROBROMIDE 2; 30; 10 MG/5ML; MG/5ML; MG/5ML
5 SYRUP ORAL 4 TIMES DAILY PRN
Qty: 120 ML | Refills: 0 | Status: SHIPPED | OUTPATIENT
Start: 2020-03-15 | End: 2020-03-20

## 2020-03-15 RX ADMIN — ACETAMINOPHEN 1000 MG: 500 TABLET ORAL at 00:40

## 2020-03-15 RX ADMIN — SODIUM CHLORIDE 1000 ML: 9 INJECTION, SOLUTION INTRAVENOUS at 00:35

## 2020-03-15 RX ADMIN — IBUPROFEN 800 MG: 200 SUSPENSION ORAL at 02:06

## 2020-03-15 RX ADMIN — KETOROLAC TROMETHAMINE 15 MG: 30 INJECTION, SOLUTION INTRAMUSCULAR; INTRAVENOUS at 00:41

## 2020-03-15 RX ADMIN — SODIUM CHLORIDE 1000 ML: 9 INJECTION, SOLUTION INTRAVENOUS at 01:34

## 2020-03-15 ASSESSMENT — PAIN SCALES - GENERAL
PAINLEVEL_OUTOF10: 0
PAINLEVEL_OUTOF10: 0
PAINLEVEL_OUTOF10: 8

## 2020-03-15 NOTE — ED PROVIDER NOTES
ED physician  HPI:  3/15/20, Time: 12:07 AM EDT         Pam Siddiqui is a 25 y.o. female presenting to the ED for sore throat. Patient reports having a sore throat over the last 3 days. She denies noticing any fevers, but expressed having chills,  she denies any illness exposure. Patient does have a right thyroid goiter which she reports is being managed by endocrinology. She reports she no longer is on any medication as she does not have Graves' disease. She states that she does have an appointment next month with her endocrinologist.  She is tachycardic on arrival here. Patient reports that she normally is. She denies chest pain she denies shortness of breath she denies abdominal pain denies any nausea, vomiting or diarrhea she also denies any recent long travel as well as no unilateral leg swelling. Patient also denies any throat tightening or drooling. Also no change in voice quality. She denies take anything over-the-counter for symptom relief. Review of Systems:   Pertinent positives and negatives are stated within HPI, all other systems reviewed and are negative.          --------------------------------------------- PAST HISTORY ---------------------------------------------  Past Medical History:  has a past medical history of Hyperthyroidism, Seizures ([facility] [address] and Trauma. Past Surgical History:  has a past surgical history that includes laryngoscopy and Foot surgery. Social History:  reports that she has never smoked. She has never used smokeless tobacco. She reports that she does not drink alcohol or use drugs. Family History: family history includes Diabetes in her maternal grandfather and maternal grandmother; Heart Failure in her maternal grandfather; Other in her mother. The patients home medications have been reviewed. Allergies: Patient has no known allergies.     -------------------------------------------------- RESULTS (L) 0.270 - 4.200 uIU/mL   T4   Result Value Ref Range    T4, Total 12.2 (H) 4.5 - 11.7 mcg/dL   POC Pregnancy Urine   Result Value Ref Range    HCG, Urine, POC Negative Negative    Lot Number [id number]     Positive QC Pass/Fail Pass     Negative QC Pass/Fail Pass        RADIOLOGY:  Interpreted by Radiologist.  No orders to display       ------------------------- NURSING NOTES AND VITALS REVIEWED ---------------------------   The nursing notes within the ED encounter and vital signs as below have been reviewed. /83   Pulse 140   Temp 99.4 °F (37.4 °C)   Resp 16   Ht 5' 2\" (1.575 m)   Wt 200 lb (90.7 kg)   LMP 02/01/2020   SpO2 97%   BMI 36.58 kg/m²   Oxygen Saturation Interpretation: Normal      ---------------------------------------------------PHYSICAL EXAM--------------------------------------      Constitutional/General: Alert and oriented x3, well appearing, non toxic in NAD  Head: Normocephalic and atraumatic  Eyes: PERRL, EOMI  Mouth: Oropharynx erythematous, no exudate. Slight tonsillar swelling, handling secretions, no trismus  Neck: Supple, full ROM, patient with right soft tissue goiter  Pulmonary: Lungs clear to auscultation bilaterally, no wheezes, rales, or rhonchi. Not in respiratory distress  Cardiovascular:  Regular rate and rhythm, no murmurs, gallops, or rubs. 2+ distal pulses  Abdomen: Soft, non tender, non distended,   Extremities: Moves all extremities x 4. Warm and well perfused  Skin: warm and dry without rash  Neurologic: GCS 15, cranial nerves II through XII grossly intact. No acute neurovascular deficit noted. Speech clear and coherent strength strong and equal bilaterally negative for meningeal irritation.   Psych: Normal Affect      ------------------------------ ED COURSE/MEDICAL DECISION MAKING----------------------  Medications   0.9 % sodium chloride bolus (1,000 mLs Intravenous New Bag 3/15/20 0134)   0.9 % sodium chloride bolus (0 mLs Intravenous Stopped 3/15/20

## 2020-07-29 ENCOUNTER — APPOINTMENT (OUTPATIENT)
Dept: GENERAL RADIOLOGY | Age: 25
End: 2020-07-29
Payer: COMMERCIAL

## 2020-07-29 ENCOUNTER — HOSPITAL ENCOUNTER (EMERGENCY)
Age: 25
Discharge: HOME | End: 2020-07-29
Payer: COMMERCIAL

## 2020-07-29 VITALS
RESPIRATION RATE: 16 BRPM | HEART RATE: 109 BPM | HEIGHT: 62 IN | TEMPERATURE: 97.7 F | OXYGEN SATURATION: 98 % | BODY MASS INDEX: 39.56 KG/M2 | WEIGHT: 215 LBS

## 2020-07-29 LAB
ALBUMIN SERPL-MCNC: 3.7 G/DL (ref 3.5–5.2)
ALP BLD-CCNC: 144 U/L (ref 35–104)
ALT SERPL-CCNC: 31 U/L (ref 0–32)
ANION GAP SERPL CALCULATED.3IONS-SCNC: 11 MMOL/L (ref 7–16)
AST SERPL-CCNC: 23 U/L (ref 0–31)
BACTERIA: ABNORMAL /HPF
BASOPHILS ABSOLUTE: 0.01 E9/L (ref 0–0.2)
BASOPHILS RELATIVE PERCENT: 0.2 % (ref 0–2)
BILIRUB SERPL-MCNC: 1.2 MG/DL (ref 0–1.2)
BILIRUBIN URINE: NEGATIVE
BLOOD, URINE: ABNORMAL
BUN BLDV-MCNC: 10 MG/DL (ref 6–20)
CALCIUM SERPL-MCNC: 9.7 MG/DL (ref 8.6–10.2)
CHLORIDE BLD-SCNC: 104 MMOL/L (ref 98–107)
CLARITY: CLEAR
CO2: 25 MMOL/L (ref 22–29)
COLOR: YELLOW
CREAT SERPL-MCNC: 0.4 MG/DL (ref 0.5–1)
EOSINOPHILS ABSOLUTE: 0.04 E9/L (ref 0.05–0.5)
EOSINOPHILS RELATIVE PERCENT: 0.6 % (ref 0–6)
GFR AFRICAN AMERICAN: >60
GFR NON-AFRICAN AMERICAN: >60 ML/MIN/1.73
GLUCOSE BLD-MCNC: 93 MG/DL (ref 74–99)
GLUCOSE URINE: NEGATIVE MG/DL
HCG(URINE) PREGNANCY TEST: NEGATIVE
HCT VFR BLD CALC: 42.5 % (ref 34–48)
HEMOGLOBIN: 13.8 G/DL (ref 11.5–15.5)
IMMATURE GRANULOCYTES #: 0.02 E9/L
IMMATURE GRANULOCYTES %: 0.3 % (ref 0–5)
KETONES, URINE: NEGATIVE MG/DL
LEUKOCYTE ESTERASE, URINE: ABNORMAL
LYMPHOCYTES ABSOLUTE: 2.33 E9/L (ref 1.5–4)
LYMPHOCYTES RELATIVE PERCENT: 37 % (ref 20–42)
MCH RBC QN AUTO: 27.8 PG (ref 26–35)
MCHC RBC AUTO-ENTMCNC: 32.5 % (ref 32–34.5)
MCV RBC AUTO: 85.7 FL (ref 80–99.9)
MONOCYTES ABSOLUTE: 0.64 E9/L (ref 0.1–0.95)
MONOCYTES RELATIVE PERCENT: 10.2 % (ref 2–12)
NEUTROPHILS ABSOLUTE: 3.26 E9/L (ref 1.8–7.3)
NEUTROPHILS RELATIVE PERCENT: 51.7 % (ref 43–80)
NITRITE, URINE: NEGATIVE
PDW BLD-RTO: 12.5 FL (ref 11.5–15)
PH UA: 6 (ref 5–9)
PLATELET # BLD: 267 E9/L (ref 130–450)
PMV BLD AUTO: 10.8 FL (ref 7–12)
POTASSIUM SERPL-SCNC: 4.2 MMOL/L (ref 3.5–5)
PROTEIN UA: NEGATIVE MG/DL
RBC # BLD: 4.96 E12/L (ref 3.5–5.5)
RBC UA: ABNORMAL /HPF (ref 0–2)
REASON FOR REJECTION: NORMAL
REASON FOR REJECTION: NORMAL
REJECTED TEST: NORMAL
REJECTED TEST: NORMAL
RENAL EPITHELIAL, UA: ABNORMAL /HPF
SARS-COV-2, NAAT: NOT DETECTED
SODIUM BLD-SCNC: 140 MMOL/L (ref 132–146)
SPECIFIC GRAVITY UA: >=1.03 (ref 1–1.03)
STREP GRP A PCR: NEGATIVE
TOTAL PROTEIN: 7 G/DL (ref 6.4–8.3)
TSH SERPL DL<=0.05 MIU/L-ACNC: <0.01 UIU/ML (ref 0.27–4.2)
UROBILINOGEN, URINE: 1 E.U./DL
WBC # BLD: 6.3 E9/L (ref 4.5–11.5)
WBC UA: ABNORMAL /HPF (ref 0–5)

## 2020-07-29 PROCEDURE — 71046 X-RAY EXAM CHEST 2 VIEWS: CPT

## 2020-07-29 PROCEDURE — 87880 STREP A ASSAY W/OPTIC: CPT

## 2020-07-29 PROCEDURE — 84443 ASSAY THYROID STIM HORMONE: CPT

## 2020-07-29 PROCEDURE — 81025 URINE PREGNANCY TEST: CPT

## 2020-07-29 PROCEDURE — U0002 COVID-19 LAB TEST NON-CDC: HCPCS

## 2020-07-29 PROCEDURE — 80053 COMPREHEN METABOLIC PANEL: CPT

## 2020-07-29 PROCEDURE — 81001 URINALYSIS AUTO W/SCOPE: CPT

## 2020-07-29 PROCEDURE — 99283 EMERGENCY DEPT VISIT LOW MDM: CPT

## 2020-07-29 PROCEDURE — 85025 COMPLETE CBC W/AUTO DIFF WBC: CPT

## 2020-07-29 RX ORDER — NAPROXEN 500 MG/1
500 TABLET ORAL 2 TIMES DAILY
Qty: 14 TABLET | Refills: 0 | Status: SHIPPED | OUTPATIENT
Start: 2020-07-29 | End: 2021-04-19

## 2020-07-29 RX ORDER — ONDANSETRON 4 MG/1
4 TABLET, FILM COATED ORAL ONCE
Status: DISCONTINUED | OUTPATIENT
Start: 2020-07-29 | End: 2020-07-29 | Stop reason: HOSPADM

## 2020-07-29 RX ORDER — LORATADINE 10 MG/1
10 TABLET ORAL DAILY
Qty: 7 TABLET | Refills: 0 | Status: SHIPPED | OUTPATIENT
Start: 2020-07-29 | End: 2020-08-05

## 2020-07-29 RX ORDER — ONDANSETRON 4 MG/1
4 TABLET, ORALLY DISINTEGRATING ORAL EVERY 8 HOURS PRN
Qty: 10 TABLET | Refills: 0 | Status: SHIPPED | OUTPATIENT
Start: 2020-07-29 | End: 2020-10-03

## 2020-07-29 ASSESSMENT — PAIN DESCRIPTION - LOCATION: LOCATION: ABDOMEN;THROAT

## 2020-07-29 ASSESSMENT — PAIN DESCRIPTION - DESCRIPTORS: DESCRIPTORS: ACHING

## 2020-07-29 ASSESSMENT — PAIN DESCRIPTION - FREQUENCY: FREQUENCY: CONTINUOUS

## 2020-07-29 ASSESSMENT — PAIN SCALES - GENERAL: PAINLEVEL_OUTOF10: 7

## 2020-07-29 NOTE — PROGRESS NOTES
CLINICAL PHARMACY NOTE: MEDS TO [address] Select Patient?: No  Total # of Prescriptions Filled: 3   The following medications were delivered to the patient:  · zofran 4mg  · Naproxen 500mg  · Allergy relief 10mg  Total # of Interventions Completed: 4  Time Spent (min): 45    Additional Documentation:

## 2020-07-29 NOTE — ED PROVIDER NOTES
[facility]       Department of Emergency Medicine   ED  Provider Note  Admit Date/RoomTime: 7/29/2020  1:02 PM  ED Room: Isaura Martinez C/HC  MRN: [id number]  Chief Complaint: Emesis (epigastric pain, since yesterday) and Pharyngitis (with runny nose with dry cough, denies fever)       History of Present Illness   Source of history provided by:  patient. History/Exam Limitations: none. Aayush Burch is a 25 y.o. female who has a past medical history of:   Past Medical History:   Diagnosis Date    Hyperthyroidism     Seizures ([facility] [address]     as young but placed on no treatment    Trauma     presents to the ED complaint of generally not feeling well. Patient states for the past 3 days she has had a head cold. Complains of stuffiness, runny nose. Sore throat. Pain with swallowing. Nonproductive cough. Patient also admits nausea, vomiting and diarrhea. Patient states that she has felt very hot for the past 2 days, but every time she checks her temperature it is normal.  Highest temperature she has had at home was 99 per patient. Patient denies shortness of breath. Denies chest pain. Denies abdominal pain. Patient is a [employer] employee, works in [employer]. 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 Patient has never had coronavirus testing. ROS    Pertinent positives and negatives are stated within HPI, all other systems reviewed and are negative. Past Surgical History:   Procedure Laterality Date    FOOT SURGERY      wart removal    LARYNGOSCOPY     Social History:  reports that she has never smoked. She has never used smokeless tobacco. She reports that she does not drink alcohol or use drugs. Family History: family history includes Diabetes in her maternal grandfather and maternal grandmother; Heart Failure in her maternal grandfather; Other in her mother. Allergies: Patient has no known allergies.     Physical Exam   Oxygen Saturation E12/L    Hemoglobin 13.8 11.5 - 15.5 g/dL    Hematocrit 42.5 34.0 - 48.0 %    MCV 85.7 80.0 - 99.9 fL    MCH 27.8 26.0 - 35.0 pg    MCHC 32.5 32.0 - 34.5 %    RDW 12.5 11.5 - 15.0 fL    Platelets 289 102 - 155 E9/L    MPV 10.8 7.0 - 12.0 fL    Neutrophils % 51.7 43.0 - 80.0 %    Immature Granulocytes % 0.3 0.0 - 5.0 %    Lymphocytes % 37.0 20.0 - 42.0 %    Monocytes % 10.2 2.0 - 12.0 %    Eosinophils % 0.6 0.0 - 6.0 %    Basophils % 0.2 0.0 - 2.0 %    Neutrophils Absolute 3.26 1.80 - 7.30 E9/L    Immature Granulocytes # 0.02 E9/L    Lymphocytes Absolute 2.33 1.50 - 4.00 E9/L    Monocytes Absolute 0.64 0.10 - 0.95 E9/L    Eosinophils Absolute 0.04 (L) 0.05 - 0.50 E9/L    Basophils Absolute 0.01 0.00 - 0.20 E9/L   Urinalysis, reflex to microscopic   Result Value Ref Range    Color, UA Yellow Straw/Yellow    Clarity, UA Clear Clear    Glucose, Ur Negative Negative mg/dL    Bilirubin Urine Negative Negative    Ketones, Urine Negative Negative mg/dL    Specific Gravity, UA >=1.030 1.005 - 1.030    Blood, Urine SMALL (A) Negative    pH, UA 6.0 5.0 - 9.0    Protein, UA Negative Negative mg/dL    Urobilinogen, Urine 1.0 <2.0 E.U./dL    Nitrite, Urine Negative Negative    Leukocyte Esterase, Urine LARGE (A) Negative   Pregnancy, Urine   Result Value Ref Range    HCG(Urine) Pregnancy Test NEGATIVE NEGATIVE   COVID-19   Result Value Ref Range    SARS-CoV-2, NAAT Not Detected Not Detected   Microscopic Urinalysis   Result Value Ref Range    WBC, UA 5-10 (A) 0 - 5 /HPF    RBC, UA 1-3 0 - 2 /HPF    Renal Epithelial, UA FEW /HPF    Bacteria, UA RARE (A) None Seen /HPF   SPECIMEN REJECTION   Result Value Ref Range    Rejected Test rss     Reason for Rejection see below    SPECIMEN REJECTION   Result Value Ref Range    Rejected Test CMPX, TSH     Reason for Rejection see below    Comprehensive metabolic panel   Result Value Ref Range    Sodium 140 132 - 146 mmol/L    Potassium 4.2 3.5 - 5.0 mmol/L    Chloride 104 98 - 107 mmol/L CO2 25 22 - 29 mmol/L    Anion Gap 11 7 - 16 mmol/L    Glucose 93 74 - 99 mg/dL    BUN 10 6 - 20 mg/dL    CREATININE 0.4 (L) 0.5 - 1.0 mg/dL    GFR Non-African American >60 >=60 mL/min/1.73    GFR African American >60     Calcium 9.7 8.6 - 10.2 mg/dL    Total Protein 7.0 6.4 - 8.3 g/dL    Alb 3.7 3.5 - 5.2 g/dL    Total Bilirubin 1.2 0.0 - 1.2 mg/dL    Alkaline Phosphatase 144 (H) 35 - 104 U/L    ALT 31 0 - 32 U/L    AST 23 0 - 31 U/L   TSH WITHOUT REFLEX   Result Value Ref Range    TSH <0.010 (L) 0.270 - 4.200 uIU/mL     Imaging: All Radiology results interpreted by Radiologist unless otherwise noted. XR CHEST (2 VW)   Final Result      1. No acute cardiopulmonary abnormality. 2. Mild leftward deviation of the trachea, likely related to the   enlarged right thyroid nodule as seen on the previous CT. Sonographic   evaluation of the thyroid gland is recommended. ED Course / Medical Decision Making     Medications   ondansetron [facility]) tablet 4 mg (has no administration in time range)        Re-examination:  7/29/20       Time: 1600   improving    Consult(s):   None    Procedure(s):   none    MDM:   Will be treated as a viral URI. Counseling: The emergency provider has spoken with the patient and discussed todays results, in addition to providing specific details for the plan of care and counseling regarding the diagnosis and prognosis. Questions are answered at this time and they are agreeable with the plan. Assessment      1. Viral URI    2. Viral pharyngitis    3.  Intractable vomiting with nausea, unspecified vomiting type      Plan   Discharge to home  Patient condition is good    New Medications     New Prescriptions    LORATADINE (CLARITIN) 10 MG TABLET    Take 1 tablet by mouth daily for 7 days    NAPROXEN (NAPROSYN) 500 MG TABLET    Take 1 tablet by mouth 2 times daily for 7 days    ONDANSETRON (ZOFRAN ODT) 4 MG DISINTEGRATING TABLET    Take 1 tablet by mouth every 8 hours as needed

## 2020-10-03 ENCOUNTER — APPOINTMENT (OUTPATIENT)
Dept: ULTRASOUND IMAGING | Age: 25
End: 2020-10-03
Payer: COMMERCIAL

## 2020-10-03 ENCOUNTER — HOSPITAL ENCOUNTER (EMERGENCY)
Age: 25
Discharge: HOME | End: 2020-10-03
Payer: COMMERCIAL

## 2020-10-03 VITALS
SYSTOLIC BLOOD PRESSURE: 113 MMHG | BODY MASS INDEX: 37.56 KG/M2 | DIASTOLIC BLOOD PRESSURE: 65 MMHG | RESPIRATION RATE: 24 BRPM | TEMPERATURE: 97.8 F | OXYGEN SATURATION: 96 % | HEART RATE: 92 BPM | WEIGHT: 212 LBS | HEIGHT: 63 IN

## 2020-10-03 LAB
ALBUMIN SERPL-MCNC: 3.8 G/DL (ref 3.5–5.2)
ALP BLD-CCNC: 129 U/L (ref 35–104)
ALT SERPL-CCNC: 25 U/L (ref 0–32)
ANION GAP SERPL CALCULATED.3IONS-SCNC: 11 MMOL/L (ref 7–16)
AST SERPL-CCNC: 17 U/L (ref 0–31)
BACTERIA: ABNORMAL /HPF
BASOPHILS ABSOLUTE: 0.01 E9/L (ref 0–0.2)
BASOPHILS RELATIVE PERCENT: 0.1 % (ref 0–2)
BILIRUB SERPL-MCNC: 0.5 MG/DL (ref 0–1.2)
BILIRUBIN URINE: NEGATIVE
BLOOD, URINE: ABNORMAL
BUN BLDV-MCNC: 16 MG/DL (ref 6–20)
CALCIUM SERPL-MCNC: 9 MG/DL (ref 8.6–10.2)
CHLORIDE BLD-SCNC: 102 MMOL/L (ref 98–107)
CLARITY: CLEAR
CO2: 24 MMOL/L (ref 22–29)
COLOR: YELLOW
CREAT SERPL-MCNC: 0.5 MG/DL (ref 0.5–1)
EOSINOPHILS ABSOLUTE: 0.07 E9/L (ref 0.05–0.5)
EOSINOPHILS RELATIVE PERCENT: 0.8 % (ref 0–6)
EPITHELIAL CELLS, UA: ABNORMAL /HPF
GFR AFRICAN AMERICAN: >60
GFR NON-AFRICAN AMERICAN: >60 ML/MIN/1.73
GLUCOSE BLD-MCNC: 99 MG/DL (ref 74–99)
GLUCOSE URINE: NEGATIVE MG/DL
HCG, URINE, POC: NEGATIVE
HCT VFR BLD CALC: 37.2 % (ref 34–48)
HEMOGLOBIN: 12.1 G/DL (ref 11.5–15.5)
IMMATURE GRANULOCYTES #: 0.02 E9/L
IMMATURE GRANULOCYTES %: 0.2 % (ref 0–5)
KETONES, URINE: NEGATIVE MG/DL
LACTIC ACID, SEPSIS: 1.5 MMOL/L (ref 0.5–1.9)
LEUKOCYTE ESTERASE, URINE: ABNORMAL
LIPASE: 30 U/L (ref 13–60)
LYMPHOCYTES ABSOLUTE: 2.94 E9/L (ref 1.5–4)
LYMPHOCYTES RELATIVE PERCENT: 34.6 % (ref 20–42)
Lab: NORMAL
MCH RBC QN AUTO: 27.9 PG (ref 26–35)
MCHC RBC AUTO-ENTMCNC: 32.5 % (ref 32–34.5)
MCV RBC AUTO: 85.9 FL (ref 80–99.9)
MONOCYTES ABSOLUTE: 0.74 E9/L (ref 0.1–0.95)
MONOCYTES RELATIVE PERCENT: 8.7 % (ref 2–12)
NEGATIVE QC PASS/FAIL: NORMAL
NEUTROPHILS ABSOLUTE: 4.72 E9/L (ref 1.8–7.3)
NEUTROPHILS RELATIVE PERCENT: 55.6 % (ref 43–80)
NITRITE, URINE: NEGATIVE
PDW BLD-RTO: 12.4 FL (ref 11.5–15)
PH UA: 7.5 (ref 5–9)
PLATELET # BLD: 246 E9/L (ref 130–450)
PMV BLD AUTO: 10.7 FL (ref 7–12)
POSITIVE QC PASS/FAIL: NORMAL
POTASSIUM SERPL-SCNC: 4.1 MMOL/L (ref 3.5–5)
PROTEIN UA: NEGATIVE MG/DL
RBC # BLD: 4.33 E12/L (ref 3.5–5.5)
RBC UA: ABNORMAL /HPF (ref 0–2)
SODIUM BLD-SCNC: 137 MMOL/L (ref 132–146)
SPECIFIC GRAVITY UA: 1.02 (ref 1–1.03)
TOTAL PROTEIN: 6.7 G/DL (ref 6.4–8.3)
UROBILINOGEN, URINE: 1 E.U./DL
WBC # BLD: 8.5 E9/L (ref 4.5–11.5)
WBC UA: ABNORMAL /HPF (ref 0–5)

## 2020-10-03 PROCEDURE — 99283 EMERGENCY DEPT VISIT LOW MDM: CPT

## 2020-10-03 PROCEDURE — 99282 EMERGENCY DEPT VISIT SF MDM: CPT

## 2020-10-03 PROCEDURE — 83690 ASSAY OF LIPASE: CPT

## 2020-10-03 PROCEDURE — 76705 ECHO EXAM OF ABDOMEN: CPT

## 2020-10-03 PROCEDURE — 85025 COMPLETE CBC W/AUTO DIFF WBC: CPT

## 2020-10-03 PROCEDURE — 80053 COMPREHEN METABOLIC PANEL: CPT

## 2020-10-03 PROCEDURE — 81001 URINALYSIS AUTO W/SCOPE: CPT

## 2020-10-03 PROCEDURE — 2580000003 HC RX 258: Performed by: NURSE PRACTITIONER

## 2020-10-03 PROCEDURE — 6360000002 HC RX W HCPCS: Performed by: NURSE PRACTITIONER

## 2020-10-03 PROCEDURE — 96374 THER/PROPH/DIAG INJ IV PUSH: CPT

## 2020-10-03 PROCEDURE — 83605 ASSAY OF LACTIC ACID: CPT

## 2020-10-03 RX ORDER — CEFDINIR 300 MG/1
300 CAPSULE ORAL 2 TIMES DAILY
Qty: 14 CAPSULE | Refills: 0 | Status: SHIPPED | OUTPATIENT
Start: 2020-10-03 | End: 2020-10-10

## 2020-10-03 RX ORDER — ONDANSETRON 2 MG/ML
4 INJECTION INTRAMUSCULAR; INTRAVENOUS ONCE
Status: COMPLETED | OUTPATIENT
Start: 2020-10-03 | End: 2020-10-03

## 2020-10-03 RX ORDER — 0.9 % SODIUM CHLORIDE 0.9 %
1000 INTRAVENOUS SOLUTION INTRAVENOUS ONCE
Status: COMPLETED | OUTPATIENT
Start: 2020-10-03 | End: 2020-10-03

## 2020-10-03 RX ORDER — ONDANSETRON 4 MG/1
4 TABLET, ORALLY DISINTEGRATING ORAL EVERY 8 HOURS PRN
Qty: 12 TABLET | Refills: 0 | Status: SHIPPED | OUTPATIENT
Start: 2020-10-03 | End: 2021-07-27

## 2020-10-03 RX ADMIN — ONDANSETRON 4 MG: 2 INJECTION INTRAMUSCULAR; INTRAVENOUS at 21:08

## 2020-10-03 RX ADMIN — CEFTRIAXONE SODIUM 1 G: 1 INJECTION, POWDER, FOR SOLUTION INTRAMUSCULAR; INTRAVENOUS at 22:35

## 2020-10-03 RX ADMIN — SODIUM CHLORIDE 1000 ML: 9 INJECTION, SOLUTION INTRAVENOUS at 21:08

## 2020-10-04 NOTE — ED PROVIDER NOTES
[facility]       Department of Emergency Medicine   ED  Provider Note  Admit Date/RoomTime: 10/3/2020  8:51 PM  ED Room: 06/06  Chief Complaint   Abdominal Pain (vomiting early today followed by chest back and stomach pains. )    History of Present Illness   Source of history provided by:  patient. History/Exam Limitations: none. Jeff Barry is a 22 y.o. old female with a past medical history of   Past Medical History:   Diagnosis Date    Hyperthyroidism     Seizures ([address]     as young but placed on no treatment    Trauma      Presents to the emergency department by private vehicle, with complaints of gradual onset nausea and vomiting of dry heaves or bile which began a few hour(s) prior to arrival.  There has been no similar episodes in the past. The symptoms are associated with anorexia, abdominal pain. The symptoms are aggravated by eating and liquids and relieved by nothing. There has been no additional symptoms of fever, chills or URI symptoms. ROS    Pertinent positives and negatives are stated within HPI, all other systems reviewed and are negative. Past Surgical History:   Procedure Laterality Date    FOOT SURGERY      wart removal    LARYNGOSCOPY     Social History:  reports that she has never smoked. She has never used smokeless tobacco. She reports that she does not drink alcohol or use drugs. Family History: family history includes Diabetes in her maternal grandfather and maternal grandmother; Heart Failure in her maternal grandfather; Other in her mother. Allergies: Patient has no known allergies. Physical Exam           ED Triage Vitals   BP Temp Temp src Pulse Resp SpO2 Height Weight   10/03/20 2049 10/03/20 2043 -- 10/03/20 2043 10/03/20 2043 10/03/20 2043 10/03/20 2050 10/03/20 2044   (!) 148/86 97.8 °F (36.6 °C)  103 20 99 % 5' 3\" (1.6 m) 212 lb (96.2 kg)      Oxygen Saturation Interpretation: Normal.    Constitutional:  Alert, development consistent with age.   HEENT: NC/NT. Airway patent. Neck:  Normal ROM. Supple. Respiratory:  Clear to auscultation and breath sounds equal.  CV:  Regular rate and rhythm, normal heart sounds, without pathological murmurs, ectopy, gallops, or rubs. GI:  General Appearance: normal.       Bowel sounds: normal bowel sounds. Distension:  None. Tenderness: mild tenderness is present in the RUQ and in the left flank. Liver: non-tender. Spleen:  non-tender. Pulsatile Mass: absent. Hernia:  no inguinal or femoral hernias noted. Integument:  Normal turgor. Warm, dry, without visible rash, unless noted elsewhere. Lymphatics: No lymphangitis or adenopathy noted. Neurological:  Oriented. Motor functions intact.     Lab / Imaging Results   (All laboratory and radiology results have been personally reviewed by myself)  Labs:  Results for orders placed or performed during the hospital encounter of 10/03/20   CBC auto differential   Result Value Ref Range    WBC 8.5 4.5 - 11.5 E9/L    RBC 4.33 3.50 - 5.50 E12/L    Hemoglobin 12.1 11.5 - 15.5 g/dL    Hematocrit 37.2 34.0 - 48.0 %    MCV 85.9 80.0 - 99.9 fL    MCH 27.9 26.0 - 35.0 pg    MCHC 32.5 32.0 - 34.5 %    RDW 12.4 11.5 - 15.0 fL    Platelets 060 715 - 050 E9/L    MPV 10.7 7.0 - 12.0 fL    Neutrophils % 55.6 43.0 - 80.0 %    Immature Granulocytes % 0.2 0.0 - 5.0 %    Lymphocytes % 34.6 20.0 - 42.0 %    Monocytes % 8.7 2.0 - 12.0 %    Eosinophils % 0.8 0.0 - 6.0 %    Basophils % 0.1 0.0 - 2.0 %    Neutrophils Absolute 4.72 1.80 - 7.30 E9/L    Immature Granulocytes # 0.02 E9/L    Lymphocytes Absolute 2.94 1.50 - 4.00 E9/L    Monocytes Absolute 0.74 0.10 - 0.95 E9/L    Eosinophils Absolute 0.07 0.05 - 0.50 E9/L    Basophils Absolute 0.01 0.00 - 0.20 E9/L   Comprehensive Metabolic Panel   Result Value Ref Range    Sodium 137 132 - 146 mmol/L    Potassium 4.1 3.5 - 5.0 mmol/L    Chloride 102 98 - 107 mmol/L    CO2 24 22 - 29 mmol/L    Anion Gap 11 7 - 16 mmol/L    Glucose 99 74 - 99 mg/dL    BUN 16 6 - 20 mg/dL    CREATININE 0.5 0.5 - 1.0 mg/dL    GFR Non-African American >60 >=60 mL/min/1.73    GFR African American >60     Calcium 9.0 8.6 - 10.2 mg/dL    Total Protein 6.7 6.4 - 8.3 g/dL    Alb 3.8 3.5 - 5.2 g/dL    Total Bilirubin 0.5 0.0 - 1.2 mg/dL    Alkaline Phosphatase 129 (H) 35 - 104 U/L    ALT 25 0 - 32 U/L    AST 17 0 - 31 U/L   Lipase   Result Value Ref Range    Lipase 30 13 - 60 U/L   Urinalysis with Microscopic   Result Value Ref Range    Color, UA Yellow Straw/Yellow    Clarity, UA Clear Clear    Glucose, Ur Negative Negative mg/dL    Bilirubin Urine Negative Negative    Ketones, Urine Negative Negative mg/dL    Specific Gravity, UA 1.020 1.005 - 1.030    Blood, Urine TRACE-INTACT Negative    pH, UA 7.5 5.0 - 9.0    Protein, UA Negative Negative mg/dL    Urobilinogen, Urine 1.0 <2.0 E.U./dL    Nitrite, Urine Negative Negative    Leukocyte Esterase, Urine LARGE (A) Negative    WBC, UA 5-10 (A) 0 - 5 /HPF    RBC, UA 1-3 0 - 2 /HPF    Epithelial Cells, UA MODERATE /HPF    Bacteria, UA MANY (A) None Seen /HPF   Lactate, Sepsis   Result Value Ref Range    Lactic Acid, Sepsis 1.5 0.5 - 1.9 mmol/L   POC Pregnancy Urine Qual   Result Value Ref Range    HCG, Urine, POC Negative Negative    Lot Number [id number]     Positive QC Pass/Fail Pass     Negative QC Pass/Fail Pass      Imaging: All Radiology results interpreted by Radiologist unless otherwise noted. US GALLBLADDER RUQ   Final Result   No evidence of cholelithiasis or cholecystitis. Negative sonographic Fields   sign. Normal extrahepatic common bile duct.            ED Course / Medical Decision Making     Medications   cefTRIAXone (ROCEPHIN) 1 g in sterile water 10 mL IV syringe (has no administration in time range)   0.9 % sodium chloride bolus (0 mLs Intravenous Stopped 10/3/20 2124)   ondansetron (ZOFRAN) injection 4 mg (4 mg Intravenous Given 10/3/20 2108) Consult(s):   none. Procedure(s):   none    MDM:   Patient is well-appearing, afebrile. Presents with gradual onset nausea and vomiting as well as abdominal pain. Labs obtained, reviewed, UA positive for leukocytes, 5-10 WBCs and bacteria. Patient did have mild right upper quadrant abdominal tenderness on exam ultrasound of the gallbladder was obtained and negative for any acute findings. Patient was given IV fluids and Zofran while in ED as well as 1 g of Rocephin. Patient able to tolerate p.o. fluids while in the emergency department. Patient will be started on cefdinir for urinary tract infection possibly early pyelonephritis, is instructed to follow-up with PCP for recheck, return to ED for any new or worsening symptoms. Counseling: The emergency provider has spoken with the patient and discussed todays results, in addition to providing specific details for the plan of care and counseling regarding the diagnosis and prognosis. Questions are answered at this time and they are agreeable with the plan. Assessment     1. Non-intractable vomiting with nausea, unspecified vomiting type    2. Acute cystitis without hematuria      Plan   Discharge to home  Patient condition is good    New Medications     New Prescriptions    CEFDINIR (OMNICEF) 300 MG CAPSULE    Take 1 capsule by mouth 2 times daily for 7 days    ONDANSETRON (ZOFRAN-ODT) 4 MG DISINTEGRATING TABLET    Place 1 tablet under the tongue every 8 hours as needed for Nausea or Vomiting     Electronically signed by NANDA Guadalupe CNP   DD: 10/3/20  **This report was transcribed using voice recognition software. Every effort was made to ensure accuracy; however, inadvertent computerized transcription errors may be present.   END OF ED PROVIDER NOTE      NANDA Avalos CNP  10/03/20 2291

## 2020-10-04 NOTE — ED NOTES
Bed: 06  Expected date:   Expected time:   Means of arrival:   Comments:  triage     Barber Macario RN  10/03/20 2051

## 2020-10-04 NOTE — ED NOTES
Patient tolerating PO fluids well no complaints of nausea at this time     Selena Kehr, RN  10/03/20 7296

## 2020-11-22 ENCOUNTER — NURSE TRIAGE (OUTPATIENT)
Dept: OTHER | Facility: CLINIC | Age: 25
End: 2020-11-22

## 2020-11-23 NOTE — TELEPHONE ENCOUNTER
Reason for Disposition   Minor injury or pain from direct blow    Answer Assessment - Initial Assessment Questions  1. MECHANISM: \"How did the injury happen? \" (e.g., twisting injury, direct blow)       Tripped, fell on left knee and left lower leg. 2. ONSET: \"When did the injury happen? \" (Minutes or hours ago)       About 20 minutes ago. 3. LOCATION: \"Where is the injury located? \"       Left knee. 4. APPEARANCE of INJURY: \"What does the injury look like? \"       Just sore. 5. SEVERITY: \"Can you put weight on that leg? \" \"Can you walk? \"       Yes, can walk. 6. SIZE: For cuts, bruises, or swelling, ask: \"How large is it? \" (e.g., inches or centimeters;  entire joint)       Denies. 7. PAIN: \"Is there pain? \" If so, ask: \"How bad is the pain? \"    (e.g., Scale 1-10; or mild, moderate, severe)      Rates as 5-6/10. Soreness. 8. TETANUS: For any breaks in the skin, ask: \"When was the last tetanus booster? \"      N/A    9. OTHER SYMPTOMS: \"Do you have any other symptoms? \"  (e.g., \"pop\" when knee injured, swelling, locking, buckling)       Denies. 10. PREGNANCY: \"Is there any chance you are pregnant? \" \"When was your last menstrual period? \"        Denies. Protocols used: KNEE INJURY-ADULT-    Patient's location of employment:  [place]. Location of injury: Left knee    Time of injury:  1920    Last 4 of patient's SSN: [id number]    Location recommended for treatment:  Home Care.

## 2020-12-16 ENCOUNTER — OFFICE VISIT (OUTPATIENT)
Dept: PRIMARY CARE CLINIC | Age: 25
End: 2020-12-16
Payer: COMMERCIAL

## 2020-12-16 VITALS
HEIGHT: 63 IN | SYSTOLIC BLOOD PRESSURE: 126 MMHG | RESPIRATION RATE: 16 BRPM | WEIGHT: 212 LBS | TEMPERATURE: 97.3 F | HEART RATE: 112 BPM | DIASTOLIC BLOOD PRESSURE: 72 MMHG | OXYGEN SATURATION: 97 % | BODY MASS INDEX: 37.56 KG/M2

## 2020-12-16 DIAGNOSIS — Z20.822 SUSPECTED COVID-19 VIRUS INFECTION: ICD-10-CM

## 2020-12-16 LAB
Lab: NORMAL
QC PASS/FAIL: NORMAL
S PYO AG THROAT QL: NORMAL
SARS-COV-2, POC: NORMAL

## 2020-12-16 PROCEDURE — 87880 STREP A ASSAY W/OPTIC: CPT | Performed by: NURSE PRACTITIONER

## 2020-12-16 PROCEDURE — 1036F TOBACCO NON-USER: CPT | Performed by: NURSE PRACTITIONER

## 2020-12-16 PROCEDURE — G8417 CALC BMI ABV UP PARAM F/U: HCPCS | Performed by: NURSE PRACTITIONER

## 2020-12-16 PROCEDURE — 87426 SARSCOV CORONAVIRUS AG IA: CPT | Performed by: NURSE PRACTITIONER

## 2020-12-16 PROCEDURE — 99214 OFFICE O/P EST MOD 30 MIN: CPT | Performed by: NURSE PRACTITIONER

## 2020-12-16 PROCEDURE — G8427 DOCREV CUR MEDS BY ELIG CLIN: HCPCS | Performed by: NURSE PRACTITIONER

## 2020-12-16 PROCEDURE — G8484 FLU IMMUNIZE NO ADMIN: HCPCS | Performed by: NURSE PRACTITIONER

## 2020-12-16 RX ORDER — METHYLPREDNISOLONE 4 MG/1
TABLET ORAL
Qty: 1 KIT | Refills: 0 | Status: SHIPPED
Start: 2020-12-16 | End: 2021-07-27

## 2020-12-16 NOTE — PROGRESS NOTES
20  Kassy Hernandez : 1995 Sex: female  Age 22 y.o. Subjective:  Chief Complaint   Patient presents with    Fatigue     x4 days     Cough    Pharyngitis    Headache       HPI:   Kassy Hernandez , 22 y.o. female presents to the clinic for evaluation of fatigue, intermittent diarrhea, dry cough, sore throat, headache x 3 days. The patient has not  taken any treatments for symptoms. The patient reports same symptoms over time. The patient reports COVID-19 (coworkers) known ill exposure. The patient denies acute loss of taste and smell, sinus congestion, rash, fever, and ear pain. The patient also denies chest pain, abdominal pain, shortness of breath, and nausea / vomiting. ROS:   Unless otherwise stated in this report the patient's positive and negative responses for review of systems for constitutional, eyes, ENT, cardiovascular, respiratory, gastrointestinal, neurological, , musculoskeletal, and integument systems and related systems to the presenting problem are either stated in the history of present illness or were not pertinent or were negative for the symptoms and/or complaints related to the presenting medical problem. Positives and pertinent negatives as per HPI. All others reviewed and are negative.       PMH:     Past Medical History:   Diagnosis Date    Hyperthyroidism     Seizures ([address]     as young but placed on no treatment    Trauma        Past Surgical History:   Procedure Laterality Date    FOOT SURGERY      wart removal    LARYNGOSCOPY         Family History   Problem Relation Age of Onset    Other Mother         colitis    Diabetes Maternal Grandmother     Diabetes Maternal Grandfather     Heart Failure Maternal Grandfather     Thyroid Cancer Neg Hx     Hyperthyroidism Neg Hx        Medications:     Current Outpatient Medications:     methylPREDNISolone (MEDROL DOSEPACK) 4 MG tablet, Take by mouth., Disp: 1 kit, Rfl: 0   ondansetron (ZOFRAN-ODT) 4 MG disintegrating tablet, Place 1 tablet under the tongue every 8 hours as needed for Nausea or Vomiting, Disp: 12 tablet, Rfl: 0    naproxen (NAPROSYN) 500 MG tablet, Take 1 tablet by mouth 2 times daily for 7 days, Disp: 14 tablet, Rfl: 0    oseltamivir 6mg/ml (TAMIFLU) 6 MG/ML SUSR suspension, Take 12.5 mLs by mouth 2 times daily, Disp: 125 mL, Rfl: 0    ibuprofen (ADVIL;MOTRIN) 100 MG/5ML suspension, Take 40 mLs by mouth every 8 hours as needed for Fever, Disp: 1 Bottle, Rfl: 3    acetaminophen (TYLENOL CHILDRENS) 160 MG/5ML suspension, Take 20.31 mLs by mouth every 6 hours as needed for Fever, Disp: 240 mL, Rfl: 3    Allergies:   No Known Allergies    Social History:     Social History     Tobacco Use    Smoking status: Never Smoker    Smokeless tobacco: Never Used   Substance Use Topics    Alcohol use: No    Drug use: No       Patient lives at home. Physical Exam:     Vitals:    12/16/20 1504 12/16/20 1517   BP: 126/72    Pulse: 123 112   Resp: 16    Temp: 97.3 °F (36.3 °C)    TempSrc: Temporal    SpO2: 98% 97%   Weight: 212 lb (96.2 kg)    Height: 5' 3\" (1.6 m)        Physical Exam (PE)    Physical Exam  Constitutional:       Appearance: Normal appearance. HENT:      Head: Normocephalic. Right Ear: External ear normal. There is impacted cerumen. Left Ear: External ear normal. There is impacted cerumen. Nose: Rhinorrhea present. Mouth/Throat:      Mouth: Mucous membranes are moist.      Pharynx: Oropharynx is clear. Posterior oropharyngeal erythema present. No oropharyngeal exudate. Eyes:      Pupils: Pupils are equal, round, and reactive to light. Neck:      Musculoskeletal: Normal range of motion and neck supple. Cardiovascular:      Rate and Rhythm: Normal rate and regular rhythm. Pulses: Normal pulses. Heart sounds: Normal heart sounds.    Pulmonary:      Effort: Pulmonary effort is normal. Breath sounds: Normal breath sounds. No wheezing, rhonchi or rales. Abdominal:      General: Bowel sounds are normal.      Palpations: Abdomen is soft. Musculoskeletal: Normal range of motion. Lymphadenopathy:      Cervical: No cervical adenopathy. Skin:     General: Skin is warm and dry. Capillary Refill: Capillary refill takes less than 2 seconds. Neurological:      General: No focal deficit present. Mental Status: She is alert and oriented to person, place, and time. Psychiatric:         Mood and Affect: Mood normal.         Behavior: Behavior normal.          Testing:   (All laboratory and radiology results have been personally reviewed by myself)  Labs:  Results for orders placed or performed in visit on 12/16/20   POCT COVID-19, Antigen   Result Value Ref Range    SARS-COV-2, POC Not-Detected Not Detected    Lot Number [id number]     QC Pass/Fail pass    POCT rapid strep A   Result Value Ref Range    Strep A Ag None Detected None Detected       Imaging: All Radiology results interpreted by Radiologist unless otherwise noted. No orders to display       Assessment / Plan:   The patient's vitals, allergies, medications, and past medical history have been reviewed. Kathya Machuca was seen today for fatigue, cough, pharyngitis and headache. Diagnoses and all orders for this visit:    Viral illness    Suspected COVID-19 virus infection  -     POCT COVID-19, Antigen  -     COVID-19 Ambulatory; Future    Sore throat  -     POCT rapid strep A  -     methylPREDNISolone (MEDROL DOSEPACK) 4 MG tablet; Take by mouth.        - Patient is directed to take the prescribed medication as ordered. Discussed the benefits of daily vitamin C 1 g and zinc 50 mg for immune support. Advised the patient to use warm salt water gargles and throat lozenges as needed for throat discomfort. Advised the patient to avoid GI irritants and to follow a brat diet and advance as tolerated. - COVID-19 swab obtained and pending, will call with results once available. Advised cautionary self-quarantine at home in the interim per CDC guidelines. Increase fluids and rest. Symptomatic relief discussed including Tylenol prn pain/fever. Schedule f/u with PCP in 2-3 days. Red flag symptoms were discussed with the patient today. The patient is directed to go the ED if symptoms worsen or change. Pt verbalizes understanding and is in agreement with plan of care. All questions answered. SIGNATURE: NANDA Zapata-CNP    This visit was provided as a focused evaluation during the COVID -19 pandemic/national emergency. A comprehensive review of all previous patient history and testing was not conducted. Pertinent findings were elicited during the visit.

## 2020-12-16 NOTE — PATIENT INSTRUCTIONS
Patient Education        Sore Throat: Care Instructions  Your Care Instructions     Infection by bacteria or a virus causes most sore throats. Cigarette smoke, dry air, air pollution, allergies, and yelling can also cause a sore throat. Sore throats can be painful and annoying. Fortunately, most sore throats go away on their own. If you have a bacterial infection, your doctor may prescribe antibiotics. Follow-up care is a key part of your treatment and safety. Be sure to make and go to all appointments, and call your doctor if you are having problems. It's also a good idea to know your test results and keep a list of the medicines you take. How can you care for yourself at home? · If your doctor prescribed antibiotics, take them as directed. Do not stop taking them just because you feel better. You need to take the full course of antibiotics. · Gargle with warm salt water once an hour to help reduce swelling and relieve discomfort. Use 1 teaspoon of salt mixed in 1 cup of warm water. · Take an over-the-counter pain medicine, such as acetaminophen (Tylenol), ibuprofen (Advil, Motrin), or naproxen (Aleve). Read and follow all instructions on the label. · Be careful when taking over-the-counter cold or flu medicines and Tylenol at the same time. Many of these medicines have acetaminophen, which is Tylenol. Read the labels to make sure that you are not taking more than the recommended dose. Too much acetaminophen (Tylenol) can be harmful. · Drink plenty of fluids. Fluids may help soothe an irritated throat. Hot fluids, such as tea or soup, may help decrease throat pain. · Use over-the-counter throat lozenges to soothe pain. Regular cough drops or hard candy may also help. These should not be given to young children because of the risk of choking. · Do not smoke or allow others to smoke around you. If you need help quitting, talk to your doctor about stop-smoking programs and medicines. These can increase your chances of quitting for good. · Use a vaporizer or humidifier to add moisture to your bedroom. Follow the directions for cleaning the machine. When should you call for help? Call your doctor now or seek immediate medical care if:    · You have new or worse trouble swallowing.     · Your sore throat gets much worse on one side. Watch closely for changes in your health, and be sure to contact your doctor if you do not get better as expected. Where can you learn more? Go to https://[employer]. org and sign in to your [employer] account. Enter M783 in the [employer] box to learn more about \"Sore Throat: Care Instructions. \"     If you do not have an account, please click on the \"Sign Up Now\" link. Current as of: April 15, 2020               Content Version: 12.6  © 2854-8604 [employer]. Care instructions adapted under license by [facility]. If you have questions about a medical condition or this instruction, always ask your healthcare professional. [place] [address] any warranty or liability for your use of this information. Patient Education        Learning About Coronavirus [phone])  Coronavirus [phone]): Overview  What is coronavirus (DLKKS-70)? The coronavirus disease (COVID-19) is caused by a virus. It is an illness that was first found in December 2019. It has since spread worldwide. The virus can cause fever, cough, and trouble breathing. In severe cases, it can cause pneumonia and make it hard to breathe without help. It can cause death. · Wear a cloth face cover if you have to go to public areas. If you know or suspect that you have COVID-19:  · Stay home. Don't go to school, work, or public areas. And don't use public transportation, ride-shares, or taxis unless you have no choice. · Leave your home only if you need to get medical care or testing. But call the doctor's office first so they know you're coming. And wear a face cover. · Limit contact with people in your home. If possible, stay in a separate bedroom and use a separate bathroom. · Wear a face cover whenever you're around other people. It can help stop the spread of the virus when you cough or sneeze. · Clean and disinfect your home every day. Use household  and disinfectant wipes or sprays. Take special care to clean things that you grab with your hands. · Self-isolate until it's safe to be around others again. ? If you have symptoms, it's safe when you haven't had a fever for 3 days and your symptoms have improved and it's been at least 10 days since your symptoms started. ? If you were exposed to the virus but don't have symptoms, it's safe to be around others 14 days after exposure. ? Talk to your doctor about whether you also need testing, especially if you have a weakened immune system. When to call for help  Call 911 anytime you think you may need emergency care. For example, call if:  · You have severe trouble breathing. (You can't talk at all.)  · You have constant chest pain or pressure. · You are severely dizzy or lightheaded. · You are confused or can't think clearly. · Your face and lips have a blue color. · You passed out (lost consciousness) or are very hard to wake up. Call your doctor now if you develop symptoms such as:  · Shortness of breath. · Fever. · Cough. If you need to get care, call ahead to the doctor's office for instructions before you go. Make sure you wear a face cover to prevent exposing other people to the virus.

## 2020-12-18 LAB
SARS-COV-2: NOT DETECTED
SOURCE: NORMAL

## 2021-02-21 ENCOUNTER — APPOINTMENT (OUTPATIENT)
Dept: GENERAL RADIOLOGY | Age: 26
End: 2021-02-21
Payer: COMMERCIAL

## 2021-02-21 ENCOUNTER — HOSPITAL ENCOUNTER (EMERGENCY)
Age: 26
Discharge: HOME | End: 2021-02-21
Payer: COMMERCIAL

## 2021-02-21 VITALS
OXYGEN SATURATION: 97 % | SYSTOLIC BLOOD PRESSURE: 142 MMHG | DIASTOLIC BLOOD PRESSURE: 99 MMHG | HEART RATE: 99 BPM | RESPIRATION RATE: 18 BRPM | TEMPERATURE: 97.7 F

## 2021-02-21 DIAGNOSIS — S80.01XA CONTUSION OF RIGHT KNEE, INITIAL ENCOUNTER: Primary | ICD-10-CM

## 2021-02-21 DIAGNOSIS — S93.401A SPRAIN OF RIGHT ANKLE, UNSPECIFIED LIGAMENT, INITIAL ENCOUNTER: ICD-10-CM

## 2021-02-21 PROCEDURE — 73564 X-RAY EXAM KNEE 4 OR MORE: CPT

## 2021-02-21 PROCEDURE — 99283 EMERGENCY DEPT VISIT LOW MDM: CPT

## 2021-02-21 PROCEDURE — 73610 X-RAY EXAM OF ANKLE: CPT

## 2021-02-21 ASSESSMENT — PAIN DESCRIPTION - ORIENTATION: ORIENTATION: RIGHT

## 2021-02-21 NOTE — ED PROVIDER NOTES
[facility]  Department of Emergency Medicine   ED  Encounter Note  Admit Date/RoomTime: 2021  3:02 PM  ED Room: [place] [address]    NAME: Bennie Posey  : 1995  MRN: [id number]     Chief Complaint:  Fall (fell walking into work, right knee pain, -LOC, -head)    History of Present Illness       Bennie Posey is a 22 y.o. old female who presents to the emergency department by private vehicle, for a mechanical fall which occured several minute(s) prior to arrival. She reportedly fell while walking while outside prior to incident with complaints of right knee and ankle pain. The patients tetanus status is up to date. Since onset the symptoms have been persistent. Her pain is aggraveated by any use of, standing or walking and relieved by rest of injured area. She denies any head injury, headache, neck pain, chest pain, abdominal pain, back pain or numbness. She reports that over the past month and a half she has fallen about 30 times because her right ankle gives out while walking which is what happened to her today. She has never been seen before for her ankle. She takes no blood thinning agents. .  ROS   Pertinent positives and negatives are stated within HPI, all other systems reviewed and are negative. Past Medical History:  has a past medical history of Hyperthyroidism, Seizures ([address] and Trauma. Surgical History:  has a past surgical history that includes laryngoscopy and Foot surgery. Social History:  reports that she has never smoked. She has never used smokeless tobacco. She reports that she does not drink alcohol or use drugs. Family History: family history includes Diabetes in her maternal grandfather and maternal grandmother; Heart Failure in her maternal grandfather; Other in her mother. Allergies: Patient has no known allergies.     Physical Exam   Oxygen Saturation Interpretation: Normal.        ED Triage Vitals [21 1445]   BP Temp Temp Source Pulse Resp SpO2 Height Weight   -- 97.7 °F (36.5 °C) Infrared 130 18 97 % -- --         Physical Exam  Constitutional:  Alert, development consistent with age. HEENT:  NC/NT. Airway patent. Neck:  No midline or paravertebral tenderness. Normal ROM. Supple. Chest:  Symmetrical without visible rash or tenderness. Respiratory:  Lungs Clear to auscultation and breath sounds equal.  CV:  Regular rate and rhythm, normal heart sounds, without pathological murmurs, ectopy, gallops, or rubs. GI:  Abdomen Soft, nontender, good bowel sounds. No firm or pulsatile mass. Pelvis:  Stable, nontender to palpation. Back:  No midline or paravertebral tenderness. No costovertebral tenderness. Extremities: Tenderness to the right knee. There is a small abrasion just below the patella. No obvious swelling or deformity noted. Range of motion is full but painful. Right medial ankle is mildly tender to palpation. Without appreciable swelling or deformity. Distal pulses intact. Integument:  Normal turgor. Warm, dry, without visible rash, unless noted elsewhere. Lymphatic: no lymphadenopathy noted  Neurological:  Oriented x3, GCS 15. Motor functions intact. Lab / Imaging Results   (All laboratory and radiology results have been personally reviewed by myself)  Labs:  No results found for this visit on 02/21/21. Imaging: All Radiology results interpreted by Radiologist unless otherwise noted. XR ANKLE RIGHT (MIN 3 VIEWS)   Final Result   1. No acute osseous findings in the right knee nor right ankle on these   exams. 2.  Small posterior calcaneal enthesophyte. RECOMMENDATION:   In the setting of trauma, if there is persistent symptoms and physical exam   warrants a repeat radiograph in 10-14 days could be considered as occult   fractures may not be evident on initial imaging evaluation. XR KNEE RIGHT (MIN 4 VIEWS)   Final Result   1.   No acute osseous findings in the right knee nor right ankle on these   exams. 2.  Small posterior calcaneal enthesophyte. RECOMMENDATION:   In the setting of trauma, if there is persistent symptoms and physical exam   warrants a repeat radiograph in 10-14 days could be considered as occult   fractures may not be evident on initial imaging evaluation. ED Course / Medical Decision Making   Medications - No data to display       MDM:   Patient presents with a fall mechanically when her right ankle gave out as it had many times prior. X-rays of the knee and ankle unremarkable. She was placed in an ankle brace and referred to orthopedic surgery for outpatient evaluation    Plan of Care/Counseling:  I reviewed today's visit with the patient in addition to providing specific details for the plan of care and counseling regarding the diagnosis and prognosis. Questions are answered at this time and are agreeable with the plan. Assessment      1. Contusion of right knee, initial encounter    2. Sprain of right ankle, unspecified ligament, initial encounter      Plan   Discharge home. Patient condition is good    New Medications     New Prescriptions    No medications on file     Electronically signed by SUMMER Duarte   DD: 2/21/21  **This report was transcribed using voice recognition software. Every effort was made to ensure accuracy; however, inadvertent computerized transcription errors may be present.   END OF ED PROVIDER NOTE       Jacki Byers, [address] Johnathan Martinez  02/21/21 5452

## 2021-02-21 NOTE — LETTER
[facility] Emergency Department  [address]  [place] [address]  Phone: [phone]               February 21, 2021    Patient: Eliseo Calderon   YOB: 1995   Date of Visit: 2/21/2021       To Whom It May Concern:    Eliseo Calderon was seen and treated in our emergency department on 2/21/2021. She may return to work on 2/22/2021.       Sincerely,       SUMMER Chavez         Signature:__________________________________

## 2021-04-19 ENCOUNTER — APPOINTMENT (OUTPATIENT)
Dept: CT IMAGING | Age: 26
End: 2021-04-19
Payer: COMMERCIAL

## 2021-04-19 ENCOUNTER — APPOINTMENT (OUTPATIENT)
Dept: GENERAL RADIOLOGY | Age: 26
End: 2021-04-19
Payer: COMMERCIAL

## 2021-04-19 ENCOUNTER — HOSPITAL ENCOUNTER (EMERGENCY)
Age: 26
Discharge: HOME | End: 2021-04-19
Payer: COMMERCIAL

## 2021-04-19 VITALS
OXYGEN SATURATION: 97 % | TEMPERATURE: 97.7 F | DIASTOLIC BLOOD PRESSURE: 74 MMHG | HEART RATE: 98 BPM | RESPIRATION RATE: 17 BRPM | SYSTOLIC BLOOD PRESSURE: 124 MMHG

## 2021-04-19 DIAGNOSIS — R55 SYNCOPE AND COLLAPSE: Primary | ICD-10-CM

## 2021-04-19 DIAGNOSIS — S20.211A CONTUSION OF RIB ON RIGHT SIDE, INITIAL ENCOUNTER: ICD-10-CM

## 2021-04-19 DIAGNOSIS — I95.1 ORTHOSTATIC HYPOTENSION: ICD-10-CM

## 2021-04-19 LAB
ALBUMIN SERPL-MCNC: 3.6 G/DL (ref 3.5–5.2)
ALP BLD-CCNC: 155 U/L (ref 35–104)
ALT SERPL-CCNC: 27 U/L (ref 0–32)
ANION GAP SERPL CALCULATED.3IONS-SCNC: 9 MMOL/L (ref 7–16)
AST SERPL-CCNC: 16 U/L (ref 0–31)
BACTERIA: ABNORMAL /HPF
BASOPHILS ABSOLUTE: 0.01 E9/L (ref 0–0.2)
BASOPHILS RELATIVE PERCENT: 0.1 % (ref 0–2)
BILIRUB SERPL-MCNC: 0.9 MG/DL (ref 0–1.2)
BILIRUBIN URINE: NEGATIVE
BLOOD, URINE: ABNORMAL
BUN BLDV-MCNC: 14 MG/DL (ref 6–20)
CALCIUM SERPL-MCNC: 9.2 MG/DL (ref 8.6–10.2)
CHLORIDE BLD-SCNC: 104 MMOL/L (ref 98–107)
CLARITY: CLEAR
CO2: 27 MMOL/L (ref 22–29)
COLOR: YELLOW
CREAT SERPL-MCNC: 0.5 MG/DL (ref 0.5–1)
D DIMER: <200 NG/ML DDU
EOSINOPHILS ABSOLUTE: 0.04 E9/L (ref 0.05–0.5)
EOSINOPHILS RELATIVE PERCENT: 0.6 % (ref 0–6)
EPITHELIAL CELLS, UA: ABNORMAL /HPF
GFR AFRICAN AMERICAN: >60
GFR NON-AFRICAN AMERICAN: >60 ML/MIN/1.73
GLUCOSE BLD-MCNC: 108 MG/DL (ref 74–99)
GLUCOSE URINE: NEGATIVE MG/DL
HCG, URINE, POC: NEGATIVE
HCT VFR BLD CALC: 38.7 % (ref 34–48)
HEMOGLOBIN: 12.3 G/DL (ref 11.5–15.5)
IMMATURE GRANULOCYTES #: 0.02 E9/L
IMMATURE GRANULOCYTES %: 0.3 % (ref 0–5)
KETONES, URINE: NEGATIVE MG/DL
LEUKOCYTE ESTERASE, URINE: ABNORMAL
LYMPHOCYTES ABSOLUTE: 2.07 E9/L (ref 1.5–4)
LYMPHOCYTES RELATIVE PERCENT: 29.4 % (ref 20–42)
Lab: NORMAL
MCH RBC QN AUTO: 27.6 PG (ref 26–35)
MCHC RBC AUTO-ENTMCNC: 31.8 % (ref 32–34.5)
MCV RBC AUTO: 86.8 FL (ref 80–99.9)
MONOCYTES ABSOLUTE: 0.6 E9/L (ref 0.1–0.95)
MONOCYTES RELATIVE PERCENT: 8.5 % (ref 2–12)
NEGATIVE QC PASS/FAIL: NORMAL
NEUTROPHILS ABSOLUTE: 4.31 E9/L (ref 1.8–7.3)
NEUTROPHILS RELATIVE PERCENT: 61.1 % (ref 43–80)
NITRITE, URINE: NEGATIVE
PDW BLD-RTO: 12.9 FL (ref 11.5–15)
PH UA: 7 (ref 5–9)
PLATELET # BLD: 277 E9/L (ref 130–450)
PMV BLD AUTO: 10.3 FL (ref 7–12)
POSITIVE QC PASS/FAIL: NORMAL
POTASSIUM REFLEX MAGNESIUM: 4.5 MMOL/L (ref 3.5–5)
PROTEIN UA: NEGATIVE MG/DL
RBC # BLD: 4.46 E12/L (ref 3.5–5.5)
RBC UA: ABNORMAL /HPF (ref 0–2)
SODIUM BLD-SCNC: 140 MMOL/L (ref 132–146)
SPECIFIC GRAVITY UA: 1.02 (ref 1–1.03)
TOTAL PROTEIN: 6.9 G/DL (ref 6.4–8.3)
TROPONIN: <0.01 NG/ML (ref 0–0.03)
UROBILINOGEN, URINE: 1 E.U./DL
WBC # BLD: 7.1 E9/L (ref 4.5–11.5)
WBC UA: ABNORMAL /HPF (ref 0–5)

## 2021-04-19 PROCEDURE — 6360000002 HC RX W HCPCS: Performed by: NURSE PRACTITIONER

## 2021-04-19 PROCEDURE — 80053 COMPREHEN METABOLIC PANEL: CPT

## 2021-04-19 PROCEDURE — 71101 X-RAY EXAM UNILAT RIBS/CHEST: CPT

## 2021-04-19 PROCEDURE — 85378 FIBRIN DEGRADE SEMIQUANT: CPT

## 2021-04-19 PROCEDURE — 70450 CT HEAD/BRAIN W/O DYE: CPT

## 2021-04-19 PROCEDURE — 99283 EMERGENCY DEPT VISIT LOW MDM: CPT

## 2021-04-19 PROCEDURE — 84484 ASSAY OF TROPONIN QUANT: CPT

## 2021-04-19 PROCEDURE — 93005 ELECTROCARDIOGRAM TRACING: CPT | Performed by: PHYSICIAN ASSISTANT

## 2021-04-19 PROCEDURE — 81001 URINALYSIS AUTO W/SCOPE: CPT

## 2021-04-19 PROCEDURE — 2580000003 HC RX 258: Performed by: NURSE PRACTITIONER

## 2021-04-19 PROCEDURE — 96374 THER/PROPH/DIAG INJ IV PUSH: CPT

## 2021-04-19 PROCEDURE — 85025 COMPLETE CBC W/AUTO DIFF WBC: CPT

## 2021-04-19 RX ORDER — ONDANSETRON 4 MG/1
4 TABLET, ORALLY DISINTEGRATING ORAL ONCE
Status: DISCONTINUED | OUTPATIENT
Start: 2021-04-19 | End: 2021-04-20 | Stop reason: HOSPADM

## 2021-04-19 RX ORDER — 0.9 % SODIUM CHLORIDE 0.9 %
1000 INTRAVENOUS SOLUTION INTRAVENOUS ONCE
Status: COMPLETED | OUTPATIENT
Start: 2021-04-19 | End: 2021-04-19

## 2021-04-19 RX ORDER — NAPROXEN 500 MG/1
500 TABLET ORAL 2 TIMES DAILY WITH MEALS
Qty: 20 TABLET | Refills: 0 | Status: SHIPPED | OUTPATIENT
Start: 2021-04-19 | End: 2021-04-27 | Stop reason: SDUPTHER

## 2021-04-19 RX ORDER — KETOROLAC TROMETHAMINE 30 MG/ML
30 INJECTION, SOLUTION INTRAMUSCULAR; INTRAVENOUS ONCE
Status: COMPLETED | OUTPATIENT
Start: 2021-04-19 | End: 2021-04-19

## 2021-04-19 RX ADMIN — SODIUM CHLORIDE 1000 ML: 9 INJECTION, SOLUTION INTRAVENOUS at 20:48

## 2021-04-19 RX ADMIN — KETOROLAC TROMETHAMINE 30 MG: 30 INJECTION, SOLUTION INTRAMUSCULAR at 21:26

## 2021-04-19 ASSESSMENT — PAIN DESCRIPTION - PAIN TYPE: TYPE: ACUTE PAIN

## 2021-04-19 ASSESSMENT — PAIN SCALES - GENERAL: PAINLEVEL_OUTOF10: 7

## 2021-04-19 ASSESSMENT — PAIN DESCRIPTION - LOCATION: LOCATION: FLANK

## 2021-04-19 NOTE — ED NOTES
FIRST PROVIDER CONTACT ASSESSMENT NOTE      Department of Emergency Medicine   ED  First Provider Note   4/19/21  1:50 PM EDT    Chief Complaint: Fall (fell at work, unknown LOC, hit R flank, dizzy, nausea at this time)      History of Present Illness:    Norman Nolan is a 22 y.o. female who presents to the ED by private car for syncopal episode. Patient states \"I got up from a chair at work and started to walk and became dizzy and nauseous and then everything went black and I passed out hitting the right side of my ribs on a desk and then falling to my side. \"  Patient states it was witnessed and she did not hit her head. Patient states she still feels dizzy and nauseated. Patient states she had a coworker drive her. Patient states there is no chance for pregnancy. Patient states she is not sexually active. Focused Screening Exam:  Constitutional:  Alert, appears stated age and is in no distress. Vitals patient's pulse is 127. Patient states \"I always run high. \"    *ALLERGIES*     Patient has no known allergies.      ED Triage Vitals   BP Temp Temp src Pulse Resp SpO2 Height Weight   -- 04/19/21 1336 -- 04/19/21 1336 04/19/21 1348 04/19/21 1336 -- --    97.7 °F (36.5 °C)  119 17 97 %          Initial Plan of Care:  Initiate Treatment-Testing, Proceed toTreatment Area When Bed Available for ED Attending/MLP to Continue Care    -----------------END OF FIRST PROVIDER CONTACT ASSESSMENT NOTE--------------  Electronically signed by Rebecca Sahu PA-C   DD: 4/19/21       Rebecca Sahu PA-C  04/19/21 4843

## 2021-04-20 LAB
EKG ATRIAL RATE: 104 BPM
EKG P AXIS: 31 DEGREES
EKG P-R INTERVAL: 128 MS
EKG Q-T INTERVAL: 318 MS
EKG QRS DURATION: 94 MS
EKG QTC CALCULATION (BAZETT): 418 MS
EKG R AXIS: 67 DEGREES
EKG T AXIS: 48 DEGREES
EKG VENTRICULAR RATE: 104 BPM

## 2021-04-20 PROCEDURE — 93010 ELECTROCARDIOGRAM REPORT: CPT | Performed by: INTERNAL MEDICINE

## 2021-04-20 NOTE — ED PROVIDER NOTES
[facility]  Department of Emergency Medicine   ED  Encounter Note  Admit Date/RoomTime: 2021  7:32 PM  ED Room: 36/36    NAME: Gibson Norman  : 1995  MRN: [id number]     Chief Complaint:  Fall (fell at work, unknown LOC, hit R flank, dizzy, nausea at this time)    History of Present Illness       Gibson Norman is a 22 y.o. old female who presents to the emergency department by private vehicle, for sudden onset of  syncope, which occured a few minute(s) prior to arrival.  The event occurred while at work. the problem is new and is initiated by moving from sitting to standing position. Prior to the event she complained of no other pertinent symptoms. There has been NO: fever, headache, chest pain, palpitations, blurred vision, diplopia, loss of vision, slurred speech, focal weakness, focal sensory loss, clumsiness, nausea, vomiting, neck pain, incontinence or seizure activity. There has been history of trauma related to event she hit her right ribcage on a shelf during the fall. The event was witnessed by a caregiver and she did not hit her head. ROS   Pertinent positives and negatives are stated within HPI, all other systems reviewed and are negative. Past Medical History:  has a past medical history of Hyperthyroidism, Seizures ([address] and Trauma. Surgical History:  has a past surgical history that includes laryngoscopy and Foot surgery. Social History:  reports that she has never smoked. She has never used smokeless tobacco. She reports that she does not drink alcohol or use drugs. Family History: family history includes Diabetes in her maternal grandfather and maternal grandmother; Heart Failure in her maternal grandfather; Other in her mother. Allergies: Patient has no known allergies.     Physical Exam   Oxygen Saturation Interpretation: Normal.        ED Triage Vitals   BP Temp Temp src Pulse Resp SpO2 Height Weight   21 1348 21 tracings. ED Course / Medical Decision Making     Medications   ondansetron (ZOFRAN-ODT) disintegrating tablet 4 mg (4 mg Oral Not Given 4/19/21 2128)   0.9 % sodium chloride bolus (0 mLs Intravenous Stopped 4/19/21 2202)   ketorolac (TORADOL) injection 30 mg (30 mg Intravenous Given 4/19/21 2126)       Consultations:             None    Procedures:   none    MDM: Patient is well-appearing, afebrile. Presents after having a syncopal episode while at work. Labs obtained, reviewed, reassuring. CT scan of the head was obtained negative for any acute findings. X-ray of the ribs was obtained also negative. Orthostatic vital signs positive. Patient was given 1 L of normal saline as well as Toradol for rib pain with improvement. She is educated to increase p.o. fluid intake advised to follow-up with PCP for reevaluation and educated on signs and symptoms which require emergent evaluation. Plan of Care/Counseling:  Myself reviewed today's visit with the patient in addition to providing specific details for the plan of care and counseling regarding the diagnosis and prognosis. Questions are answered at this time and are agreeable with the plan. Assessment      1. Syncope and collapse    2. Orthostatic hypotension    3. Contusion of rib on right side, initial encounter      This patient's ED course included: a personal history and physicial examination and re-evaluation prior to disposition  This patient has remained hemodynamically stable during their ED course. Plan   Discharge home. Patient condition is good. New Medications     Discharge Medication List as of 4/19/2021  9:37 PM        Electronically signed by NANDA Carney CNP   DD: 4/19/21  **This report was transcribed using voice recognition software. Every effort was made to ensure accuracy; however, inadvertent computerized transcription errors may be present.   END OF PROVIDER NOTE     NANDA Palacios CNP  04/19/21 2000 [facility]

## 2021-04-20 NOTE — ED NOTES
Lab called to determine whether a drug screen is needed for workers comp.       Carmen Lisa, ROXANNE  04/19/21 2442

## 2021-04-20 NOTE — PROGRESS NOTES
CLINICAL PHARMACY NOTE: MEDS TO [address] Select Patient?: Yes  Total # of Prescriptions Filled: 1   The following medications were delivered to the patient:  · Naproxen 500 mg     Total # of Interventions Completed: 2  Time Spent (min): 30    Additional Documentation:

## 2021-04-27 ENCOUNTER — APPOINTMENT (OUTPATIENT)
Dept: GENERAL RADIOLOGY | Age: 26
End: 2021-04-27
Payer: COMMERCIAL

## 2021-04-27 ENCOUNTER — HOSPITAL ENCOUNTER (EMERGENCY)
Age: 26
Discharge: HOME | End: 2021-04-27
Payer: COMMERCIAL

## 2021-04-27 VITALS
TEMPERATURE: 99.2 F | HEIGHT: 62 IN | DIASTOLIC BLOOD PRESSURE: 70 MMHG | SYSTOLIC BLOOD PRESSURE: 130 MMHG | WEIGHT: 225 LBS | HEART RATE: 97 BPM | RESPIRATION RATE: 17 BRPM | OXYGEN SATURATION: 96 % | BODY MASS INDEX: 41.41 KG/M2
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
## 2021-04-27 DIAGNOSIS — S80.01XA CONTUSION OF RIGHT KNEE, INITIAL ENCOUNTER: ICD-10-CM

## 2021-04-27 PROCEDURE — 73562 X-RAY EXAM OF KNEE 3: CPT

## 2021-04-27 PROCEDURE — 99283 EMERGENCY DEPT VISIT LOW MDM: CPT

## 2021-04-27 PROCEDURE — 6370000000 HC RX 637 (ALT 250 FOR IP): Performed by: NURSE PRACTITIONER

## 2021-04-27 RX ORDER — NAPROXEN 500 MG/1
500 TABLET ORAL 2 TIMES DAILY WITH MEALS
Qty: 20 TABLET | Refills: 0 | Status: SHIPPED | OUTPATIENT
Start: 2021-04-27 | End: 2021-07-27

## 2021-04-27 RX ORDER — IBUPROFEN 800 MG/1
800 TABLET ORAL ONCE
Status: COMPLETED | OUTPATIENT
Start: 2021-04-27 | End: 2021-04-27

## 2021-04-27 RX ADMIN — IBUPROFEN 800 MG: 800 TABLET, FILM COATED ORAL at 18:23

## 2021-04-27 ASSESSMENT — PAIN SCALES - GENERAL: PAINLEVEL_OUTOF10: 8

## 2021-04-28 ENCOUNTER — NURSE TRIAGE (OUTPATIENT)
Dept: OTHER | Facility: CLINIC | Age: 26
End: 2021-04-28

## 2021-04-28 NOTE — TELEPHONE ENCOUNTER
Location of employment: [employer]     Location of injury (body part involved): Right knee    Time of injury: 5:30pm    Last 4 of SSN: [id number]      Reason for Disposition   Health Information question, no triage required and triager able to answer question    Answer Assessment - Initial Assessment Questions  1. REASON FOR CALL or QUESTION: \"What is your reason for calling today? \" or \"How can I best help you? \" or \"What question do you have that I can help answer? \"      Janes Mosqueda states she fell yesterday while at work, seen in [employer] [facility], and has an appointment with Occupation Health tomorrow. States she was instructed by management to call nurse assess today to report. No triage needed.     Protocols used: INFORMATION ONLY CALL - NO TRIAGE-ADULT-

## 2021-04-28 NOTE — ED PROVIDER NOTES
[address]  Department of Emergency Medicine   ED  Encounter Note  Admit Date/RoomTime: 2021  5:57 PM  ED Room: ST/-2    NAME: Allison Kessler  : 1995  MRN: [id number]     Chief Complaint:  Fall (Patient fell on wet floor, c/o right knee pain)    History of Present Illness       Allison Kessler is a 22 y.o. old female who presents to the emergency department by private vehicle, for traumatic pain to right knee  which occured a few minute(s) prior to arrival.  The complaint is due to slipped on wet floor and landed on her knee. Since onset the symptoms have been stable. Patient  has a prior history of pain to mentioned area related to today's visit. Her pain is aggraveated by any movement and relieved by nothing, as no treatment has been provided prior to this visit. Her weight bearing ability:  normal. She denies any head injury, loss of consciousness, neck pain, abdominal pain or back pain. ROS   Pertinent positives and negatives are stated within HPI, all other systems reviewed and are negative. Past Medical History:  has a past medical history of Hyperthyroidism, Seizures ([address] and Trauma. Surgical History:  has a past surgical history that includes laryngoscopy and Foot surgery. Social History:  reports that she has never smoked. She has never used smokeless tobacco. She reports that she does not drink alcohol or use drugs. Family History: family history includes Diabetes in her maternal grandfather and maternal grandmother; Heart Failure in her maternal grandfather; Other in her mother. Allergies: Patient has no known allergies. Physical Exam   Oxygen Saturation Interpretation: Normal.        ED Triage Vitals [21 1817]   BP Temp Temp Source Pulse Resp SpO2 Height Weight   130/70 99.2 °F (37.3 °C) Oral 120 17 96 % 5' 2\" (1.575 m) 225 lb (102.1 kg)         Constitutional:  Alert, development consistent with age. Neck:  Normal ROM. counseling regarding the diagnosis and prognosis. Questions are answered at this time and are agreeable with the plan. Assessment      1. Fall, initial encounter    2. Contusion of right knee, initial encounter      Plan   Discharge home. Patient condition is good    New Medications     Discharge Medication List as of 4/27/2021  7:45 PM        Electronically signed by NANDA Anderson CNP   DD: 4/27/21  **This report was transcribed using voice recognition software. Every effort was made to ensure accuracy; however, inadvertent computerized transcription errors may be present.   END OF ED PROVIDER NOTE      NANDA Powell CNP  04/27/21 2040

## 2021-05-22 ENCOUNTER — HOSPITAL ENCOUNTER (OUTPATIENT)
Dept: MRI IMAGING | Age: 26
Discharge: HOME | End: 2021-05-24
Payer: COMMERCIAL

## 2021-05-22 DIAGNOSIS — S80.01XA CONTUSION OF RIGHT KNEE, INITIAL ENCOUNTER: ICD-10-CM

## 2021-05-22 PROCEDURE — 73721 MRI JNT OF LWR EXTRE W/O DYE: CPT

## 2021-07-27 ENCOUNTER — HOSPITAL ENCOUNTER (EMERGENCY)
Age: 26
Discharge: ANOTHER HOSPITAL | End: 2021-07-28
Attending: EMERGENCY MEDICINE
Payer: COMMERCIAL

## 2021-07-27 DIAGNOSIS — K35.20 ACUTE APPENDICITIS WITH GENERALIZED PERITONITIS, WITHOUT GANGRENE OR ABSCESS, UNSPECIFIED WHETHER PERFORATION PRESENT: Primary | ICD-10-CM

## 2021-07-27 PROCEDURE — 81025 URINE PREGNANCY TEST: CPT

## 2021-07-27 PROCEDURE — 99285 EMERGENCY DEPT VISIT HI MDM: CPT

## 2021-07-27 PROCEDURE — 81001 URINALYSIS AUTO W/SCOPE: CPT

## 2021-07-27 ASSESSMENT — PAIN DESCRIPTION - PAIN TYPE: TYPE: ACUTE PAIN

## 2021-07-27 ASSESSMENT — PAIN DESCRIPTION - DESCRIPTORS: DESCRIPTORS: SHARP

## 2021-07-27 ASSESSMENT — PAIN SCALES - GENERAL: PAINLEVEL_OUTOF10: 9

## 2021-07-27 ASSESSMENT — PAIN DESCRIPTION - LOCATION: LOCATION: ABDOMEN

## 2021-07-28 ENCOUNTER — ANESTHESIA EVENT (OUTPATIENT)
Dept: OPERATING ROOM | Age: 26
DRG: 720 | End: 2021-07-28
Payer: COMMERCIAL

## 2021-07-28 ENCOUNTER — APPOINTMENT (OUTPATIENT)
Dept: CT IMAGING | Age: 26
End: 2021-07-28
Payer: COMMERCIAL

## 2021-07-28 ENCOUNTER — HOSPITAL ENCOUNTER (INPATIENT)
Age: 26
LOS: 6 days | Discharge: HOME | DRG: 720 | End: 2021-08-05
Attending: SURGERY | Admitting: SURGERY
Payer: COMMERCIAL

## 2021-07-28 ENCOUNTER — ANESTHESIA (OUTPATIENT)
Dept: OPERATING ROOM | Age: 26
DRG: 720 | End: 2021-07-28
Payer: COMMERCIAL

## 2021-07-28 VITALS
DIASTOLIC BLOOD PRESSURE: 74 MMHG | HEART RATE: 100 BPM | SYSTOLIC BLOOD PRESSURE: 110 MMHG | TEMPERATURE: 98.2 F | OXYGEN SATURATION: 98 % | RESPIRATION RATE: 16 BRPM | BODY MASS INDEX: 41.41 KG/M2 | WEIGHT: 225 LBS | HEIGHT: 62 IN

## 2021-07-28 VITALS — OXYGEN SATURATION: 100 % | TEMPERATURE: 90.7 F | DIASTOLIC BLOOD PRESSURE: 59 MMHG | SYSTOLIC BLOOD PRESSURE: 120 MMHG

## 2021-07-28 DIAGNOSIS — E05.90 HYPERTHYROIDISM: ICD-10-CM

## 2021-07-28 DIAGNOSIS — K35.32 ACUTE APPENDICITIS WITH PERFORATION AND LOCALIZED PERITONITIS, UNSPECIFIED WHETHER ABSCESS PRESENT, UNSPECIFIED WHETHER GANGRENE PRESENT: Primary | ICD-10-CM

## 2021-07-28 PROBLEM — K35.80 ACUTE APPENDICITIS: Status: ACTIVE | Noted: 2021-07-28

## 2021-07-28 LAB
ALBUMIN SERPL-MCNC: 3.8 G/DL (ref 3.5–5.2)
ALP BLD-CCNC: 172 U/L (ref 35–104)
ALT SERPL-CCNC: 26 U/L (ref 0–32)
ANION GAP SERPL CALCULATED.3IONS-SCNC: 12 MMOL/L (ref 7–16)
AST SERPL-CCNC: 20 U/L (ref 0–31)
BACTERIA: ABNORMAL /HPF
BASOPHILS ABSOLUTE: 0.02 E9/L (ref 0–0.2)
BASOPHILS RELATIVE PERCENT: 0.1 % (ref 0–2)
BILIRUB SERPL-MCNC: 1.1 MG/DL (ref 0–1.2)
BILIRUBIN URINE: ABNORMAL
BLOOD, URINE: ABNORMAL
BUN BLDV-MCNC: 14 MG/DL (ref 6–20)
C-REACTIVE PROTEIN: 12.1 MG/DL (ref 0–0.4)
CALCIUM SERPL-MCNC: 9.7 MG/DL (ref 8.6–10.2)
CHLORIDE BLD-SCNC: 102 MMOL/L (ref 98–107)
CLARITY: CLEAR
CO2: 24 MMOL/L (ref 22–29)
COLOR: YELLOW
CREAT SERPL-MCNC: 0.5 MG/DL (ref 0.5–1)
EOSINOPHILS ABSOLUTE: 0.01 E9/L (ref 0.05–0.5)
EOSINOPHILS RELATIVE PERCENT: 0.1 % (ref 0–6)
EPITHELIAL CELLS, UA: ABNORMAL /HPF
GFR AFRICAN AMERICAN: >60
GFR NON-AFRICAN AMERICAN: >60 ML/MIN/1.73
GLUCOSE BLD-MCNC: 132 MG/DL (ref 74–99)
GLUCOSE URINE: NEGATIVE MG/DL
HCG(URINE) PREGNANCY TEST: NEGATIVE
HCT VFR BLD CALC: 39.1 % (ref 34–48)
HEMOGLOBIN: 13.3 G/DL (ref 11.5–15.5)
IMMATURE GRANULOCYTES #: 0.06 E9/L
IMMATURE GRANULOCYTES %: 0.4 % (ref 0–5)
KETONES, URINE: 15 MG/DL
LEUKOCYTE ESTERASE, URINE: NEGATIVE
LYMPHOCYTES ABSOLUTE: 1.49 E9/L (ref 1.5–4)
LYMPHOCYTES RELATIVE PERCENT: 10.8 % (ref 20–42)
MCH RBC QN AUTO: 28.3 PG (ref 26–35)
MCHC RBC AUTO-ENTMCNC: 34 % (ref 32–34.5)
MCV RBC AUTO: 83.2 FL (ref 80–99.9)
MONOCYTES ABSOLUTE: 0.58 E9/L (ref 0.1–0.95)
MONOCYTES RELATIVE PERCENT: 4.2 % (ref 2–12)
NEUTROPHILS ABSOLUTE: 11.6 E9/L (ref 1.8–7.3)
NEUTROPHILS RELATIVE PERCENT: 84.4 % (ref 43–80)
NITRITE, URINE: NEGATIVE
PDW BLD-RTO: 12.6 FL (ref 11.5–15)
PH UA: 5.5 (ref 5–9)
PLATELET # BLD: 298 E9/L (ref 130–450)
PMV BLD AUTO: 10.6 FL (ref 7–12)
POTASSIUM REFLEX MAGNESIUM: 4.2 MMOL/L (ref 3.5–5)
PROTEIN UA: ABNORMAL MG/DL
RBC # BLD: 4.7 E12/L (ref 3.5–5.5)
RBC UA: ABNORMAL /HPF (ref 0–2)
SEDIMENTATION RATE, ERYTHROCYTE: 25 MM/HR (ref 0–20)
SODIUM BLD-SCNC: 138 MMOL/L (ref 132–146)
SPECIFIC GRAVITY UA: >=1.03 (ref 1–1.03)
TOTAL PROTEIN: 7.8 G/DL (ref 6.4–8.3)
UROBILINOGEN, URINE: 0.2 E.U./DL
WBC # BLD: 13.8 E9/L (ref 4.5–11.5)
WBC UA: ABNORMAL /HPF (ref 0–5)

## 2021-07-28 PROCEDURE — 6360000002 HC RX W HCPCS: Performed by: STUDENT IN AN ORGANIZED HEALTH CARE EDUCATION/TRAINING PROGRAM

## 2021-07-28 PROCEDURE — 2580000003 HC RX 258: Performed by: SPECIALIST

## 2021-07-28 PROCEDURE — 74177 CT ABD & PELVIS W/CONTRAST: CPT

## 2021-07-28 PROCEDURE — 3700000000 HC ANESTHESIA ATTENDED CARE: Performed by: SURGERY

## 2021-07-28 PROCEDURE — 6360000002 HC RX W HCPCS: Performed by: EMERGENCY MEDICINE

## 2021-07-28 PROCEDURE — 6360000004 HC RX CONTRAST MEDICATION: Performed by: RADIOLOGY

## 2021-07-28 PROCEDURE — 86140 C-REACTIVE PROTEIN: CPT

## 2021-07-28 PROCEDURE — 36415 COLL VENOUS BLD VENIPUNCTURE: CPT

## 2021-07-28 PROCEDURE — 6370000000 HC RX 637 (ALT 250 FOR IP): Performed by: STUDENT IN AN ORGANIZED HEALTH CARE EDUCATION/TRAINING PROGRAM

## 2021-07-28 PROCEDURE — 2500000003 HC RX 250 WO HCPCS: Performed by: STUDENT IN AN ORGANIZED HEALTH CARE EDUCATION/TRAINING PROGRAM

## 2021-07-28 PROCEDURE — 80053 COMPREHEN METABOLIC PANEL: CPT

## 2021-07-28 PROCEDURE — 2580000003 HC RX 258: Performed by: STUDENT IN AN ORGANIZED HEALTH CARE EDUCATION/TRAINING PROGRAM

## 2021-07-28 PROCEDURE — G0379 DIRECT REFER HOSPITAL OBSERV: HCPCS

## 2021-07-28 PROCEDURE — 6360000002 HC RX W HCPCS

## 2021-07-28 PROCEDURE — 2500000003 HC RX 250 WO HCPCS

## 2021-07-28 PROCEDURE — 87116 MYCOBACTERIA CULTURE: CPT

## 2021-07-28 PROCEDURE — 87206 SMEAR FLUORESCENT/ACID STAI: CPT

## 2021-07-28 PROCEDURE — 87205 SMEAR GRAM STAIN: CPT

## 2021-07-28 PROCEDURE — 96376 TX/PRO/DX INJ SAME DRUG ADON: CPT

## 2021-07-28 PROCEDURE — 87081 CULTURE SCREEN ONLY: CPT

## 2021-07-28 PROCEDURE — 6360000002 HC RX W HCPCS: Performed by: SPECIALIST

## 2021-07-28 PROCEDURE — 2580000003 HC RX 258: Performed by: EMERGENCY MEDICINE

## 2021-07-28 PROCEDURE — 87102 FUNGUS ISOLATION CULTURE: CPT

## 2021-07-28 PROCEDURE — 87015 SPECIMEN INFECT AGNT CONCNTJ: CPT

## 2021-07-28 PROCEDURE — 7100000001 HC PACU RECOVERY - ADDTL 15 MIN: Performed by: SURGERY

## 2021-07-28 PROCEDURE — 6360000002 HC RX W HCPCS: Performed by: ANESTHESIOLOGY

## 2021-07-28 PROCEDURE — 3600000014 HC SURGERY LEVEL 4 ADDTL 15MIN: Performed by: SURGERY

## 2021-07-28 PROCEDURE — 3E1M38Z IRRIGATION OF PERITONEAL CAVITY USING IRRIGATING SUBSTANCE, PERCUTANEOUS APPROACH: ICD-10-PCS | Performed by: SURGERY

## 2021-07-28 PROCEDURE — 3600000004 HC SURGERY LEVEL 4 BASE: Performed by: SURGERY

## 2021-07-28 PROCEDURE — 87075 CULTR BACTERIA EXCEPT BLOOD: CPT

## 2021-07-28 PROCEDURE — 96365 THER/PROPH/DIAG IV INF INIT: CPT

## 2021-07-28 PROCEDURE — 87070 CULTURE OTHR SPECIMN AEROBIC: CPT

## 2021-07-28 PROCEDURE — 85025 COMPLETE CBC W/AUTO DIFF WBC: CPT

## 2021-07-28 PROCEDURE — 49320 DIAG LAPARO SEPARATE PROC: CPT | Performed by: SURGERY

## 2021-07-28 PROCEDURE — 85651 RBC SED RATE NONAUTOMATED: CPT

## 2021-07-28 PROCEDURE — 87186 SC STD MICRODIL/AGAR DIL: CPT

## 2021-07-28 PROCEDURE — 3700000001 HC ADD 15 MINUTES (ANESTHESIA): Performed by: SURGERY

## 2021-07-28 PROCEDURE — G0378 HOSPITAL OBSERVATION PER HR: HCPCS

## 2021-07-28 PROCEDURE — 99223 1ST HOSP IP/OBS HIGH 75: CPT | Performed by: SURGERY

## 2021-07-28 PROCEDURE — 2580000003 HC RX 258: Performed by: RADIOLOGY

## 2021-07-28 PROCEDURE — 87077 CULTURE AEROBIC IDENTIFY: CPT

## 2021-07-28 PROCEDURE — 2709999900 HC NON-CHARGEABLE SUPPLY: Performed by: SURGERY

## 2021-07-28 PROCEDURE — 96375 TX/PRO/DX INJ NEW DRUG ADDON: CPT

## 2021-07-28 PROCEDURE — 7100000000 HC PACU RECOVERY - FIRST 15 MIN: Performed by: SURGERY

## 2021-07-28 RX ORDER — ONDANSETRON 2 MG/ML
INJECTION INTRAMUSCULAR; INTRAVENOUS PRN
Status: DISCONTINUED | OUTPATIENT
Start: 2021-07-28 | End: 2021-07-28 | Stop reason: SDUPTHER

## 2021-07-28 RX ORDER — SODIUM CHLORIDE 0.9 % (FLUSH) 0.9 %
5-40 SYRINGE (ML) INJECTION PRN
Status: DISCONTINUED | OUTPATIENT
Start: 2021-07-28 | End: 2021-08-05 | Stop reason: HOSPADM

## 2021-07-28 RX ORDER — ESMOLOL HYDROCHLORIDE 10 MG/ML
INJECTION INTRAVENOUS PRN
Status: DISCONTINUED | OUTPATIENT
Start: 2021-07-28 | End: 2021-07-28 | Stop reason: SDUPTHER

## 2021-07-28 RX ORDER — OXYCODONE HYDROCHLORIDE AND ACETAMINOPHEN 5; 325 MG/1; MG/1
1 TABLET ORAL PRN
Status: DISCONTINUED | OUTPATIENT
Start: 2021-07-28 | End: 2021-07-28 | Stop reason: HOSPADM

## 2021-07-28 RX ORDER — ONDANSETRON 4 MG/1
4 TABLET, ORALLY DISINTEGRATING ORAL EVERY 8 HOURS PRN
Status: DISCONTINUED | OUTPATIENT
Start: 2021-07-28 | End: 2021-08-05 | Stop reason: HOSPADM

## 2021-07-28 RX ORDER — SUCCINYLCHOLINE/SOD CL,ISO/PF 200MG/10ML
SYRINGE (ML) INTRAVENOUS PRN
Status: DISCONTINUED | OUTPATIENT
Start: 2021-07-28 | End: 2021-07-28 | Stop reason: SDUPTHER

## 2021-07-28 RX ORDER — ONDANSETRON 2 MG/ML
INJECTION INTRAMUSCULAR; INTRAVENOUS PRN
Status: DISCONTINUED | OUTPATIENT
Start: 2021-07-28 | End: 2021-07-28

## 2021-07-28 RX ORDER — DEXAMETHASONE SODIUM PHOSPHATE 4 MG/ML
INJECTION, SOLUTION INTRA-ARTICULAR; INTRALESIONAL; INTRAMUSCULAR; INTRAVENOUS; SOFT TISSUE PRN
Status: DISCONTINUED | OUTPATIENT
Start: 2021-07-28 | End: 2021-07-28 | Stop reason: SDUPTHER

## 2021-07-28 RX ORDER — ONDANSETRON 2 MG/ML
4 INJECTION INTRAMUSCULAR; INTRAVENOUS EVERY 6 HOURS PRN
Status: DISCONTINUED | OUTPATIENT
Start: 2021-07-28 | End: 2021-08-05 | Stop reason: HOSPADM

## 2021-07-28 RX ORDER — ACETAMINOPHEN 325 MG/1
650 TABLET ORAL EVERY 4 HOURS PRN
Status: DISCONTINUED | OUTPATIENT
Start: 2021-07-28 | End: 2021-07-29

## 2021-07-28 RX ORDER — LABETALOL HYDROCHLORIDE 5 MG/ML
INJECTION, SOLUTION INTRAVENOUS PRN
Status: DISCONTINUED | OUTPATIENT
Start: 2021-07-28 | End: 2021-07-28 | Stop reason: SDUPTHER

## 2021-07-28 RX ORDER — FENTANYL CITRATE 50 UG/ML
50 INJECTION, SOLUTION INTRAMUSCULAR; INTRAVENOUS ONCE
Status: COMPLETED | OUTPATIENT
Start: 2021-07-28 | End: 2021-07-28

## 2021-07-28 RX ORDER — ROCURONIUM BROMIDE 10 MG/ML
INJECTION, SOLUTION INTRAVENOUS PRN
Status: DISCONTINUED | OUTPATIENT
Start: 2021-07-28 | End: 2021-07-28 | Stop reason: SDUPTHER

## 2021-07-28 RX ORDER — GLYCOPYRROLATE 1 MG/5 ML
SYRINGE (ML) INTRAVENOUS PRN
Status: DISCONTINUED | OUTPATIENT
Start: 2021-07-28 | End: 2021-07-28 | Stop reason: SDUPTHER

## 2021-07-28 RX ORDER — MIDAZOLAM HYDROCHLORIDE 1 MG/ML
INJECTION INTRAMUSCULAR; INTRAVENOUS PRN
Status: DISCONTINUED | OUTPATIENT
Start: 2021-07-28 | End: 2021-07-28 | Stop reason: SDUPTHER

## 2021-07-28 RX ORDER — 0.9 % SODIUM CHLORIDE 0.9 %
1000 INTRAVENOUS SOLUTION INTRAVENOUS ONCE
Status: COMPLETED | OUTPATIENT
Start: 2021-07-28 | End: 2021-07-28

## 2021-07-28 RX ORDER — DIPHENHYDRAMINE HYDROCHLORIDE 50 MG/ML
12.5 INJECTION INTRAMUSCULAR; INTRAVENOUS
Status: DISCONTINUED | OUTPATIENT
Start: 2021-07-28 | End: 2021-07-28 | Stop reason: HOSPADM

## 2021-07-28 RX ORDER — SODIUM CHLORIDE 9 MG/ML
1000 INJECTION, SOLUTION INTRAVENOUS CONTINUOUS
Status: DISCONTINUED | OUTPATIENT
Start: 2021-07-28 | End: 2021-07-28 | Stop reason: HOSPADM

## 2021-07-28 RX ORDER — SODIUM CHLORIDE 0.9 % (FLUSH) 0.9 %
10 SYRINGE (ML) INJECTION PRN
Status: COMPLETED | OUTPATIENT
Start: 2021-07-28 | End: 2021-07-28

## 2021-07-28 RX ORDER — PROMETHAZINE HYDROCHLORIDE 25 MG/ML
6.25 INJECTION, SOLUTION INTRAMUSCULAR; INTRAVENOUS PRN
Status: DISCONTINUED | OUTPATIENT
Start: 2021-07-28 | End: 2021-07-28 | Stop reason: HOSPADM

## 2021-07-28 RX ORDER — SODIUM CHLORIDE, SODIUM LACTATE, POTASSIUM CHLORIDE, CALCIUM CHLORIDE 600; 310; 30; 20 MG/100ML; MG/100ML; MG/100ML; MG/100ML
INJECTION, SOLUTION INTRAVENOUS CONTINUOUS
Status: DISCONTINUED | OUTPATIENT
Start: 2021-07-28 | End: 2021-08-05 | Stop reason: HOSPADM

## 2021-07-28 RX ORDER — FENTANYL CITRATE 50 UG/ML
INJECTION, SOLUTION INTRAMUSCULAR; INTRAVENOUS PRN
Status: DISCONTINUED | OUTPATIENT
Start: 2021-07-28 | End: 2021-07-28 | Stop reason: SDUPTHER

## 2021-07-28 RX ORDER — LIDOCAINE HYDROCHLORIDE 20 MG/ML
INJECTION, SOLUTION EPIDURAL; INFILTRATION; INTRACAUDAL; PERINEURAL PRN
Status: DISCONTINUED | OUTPATIENT
Start: 2021-07-28 | End: 2021-07-28 | Stop reason: SDUPTHER

## 2021-07-28 RX ORDER — MEPERIDINE HYDROCHLORIDE 25 MG/ML
12.5 INJECTION INTRAMUSCULAR; INTRAVENOUS; SUBCUTANEOUS EVERY 10 MIN PRN
Status: DISCONTINUED | OUTPATIENT
Start: 2021-07-28 | End: 2021-07-28 | Stop reason: HOSPADM

## 2021-07-28 RX ORDER — FENTANYL CITRATE 50 UG/ML
100 INJECTION, SOLUTION INTRAMUSCULAR; INTRAVENOUS ONCE
Status: COMPLETED | OUTPATIENT
Start: 2021-07-28 | End: 2021-07-28

## 2021-07-28 RX ORDER — ONDANSETRON 2 MG/ML
4 INJECTION INTRAMUSCULAR; INTRAVENOUS ONCE
Status: COMPLETED | OUTPATIENT
Start: 2021-07-28 | End: 2021-07-28

## 2021-07-28 RX ORDER — PROPOFOL 10 MG/ML
INJECTION, EMULSION INTRAVENOUS PRN
Status: DISCONTINUED | OUTPATIENT
Start: 2021-07-28 | End: 2021-07-28 | Stop reason: SDUPTHER

## 2021-07-28 RX ORDER — NEOSTIGMINE METHYLSULFATE 1 MG/ML
INJECTION, SOLUTION INTRAVENOUS PRN
Status: DISCONTINUED | OUTPATIENT
Start: 2021-07-28 | End: 2021-07-28 | Stop reason: SDUPTHER

## 2021-07-28 RX ORDER — SODIUM CHLORIDE 0.9 % (FLUSH) 0.9 %
5-40 SYRINGE (ML) INJECTION EVERY 12 HOURS SCHEDULED
Status: DISCONTINUED | OUTPATIENT
Start: 2021-07-28 | End: 2021-08-05 | Stop reason: HOSPADM

## 2021-07-28 RX ORDER — FENTANYL CITRATE 50 UG/ML
25 INJECTION, SOLUTION INTRAMUSCULAR; INTRAVENOUS EVERY 5 MIN PRN
Status: DISCONTINUED | OUTPATIENT
Start: 2021-07-28 | End: 2021-07-28 | Stop reason: HOSPADM

## 2021-07-28 RX ORDER — SODIUM CHLORIDE 9 MG/ML
25 INJECTION, SOLUTION INTRAVENOUS PRN
Status: DISCONTINUED | OUTPATIENT
Start: 2021-07-28 | End: 2021-08-05 | Stop reason: HOSPADM

## 2021-07-28 RX ADMIN — MIDAZOLAM 2 MG: 1 INJECTION INTRAMUSCULAR; INTRAVENOUS at 06:44

## 2021-07-28 RX ADMIN — FENTANYL CITRATE 50 MCG: 50 INJECTION, SOLUTION INTRAMUSCULAR; INTRAVENOUS at 08:11

## 2021-07-28 RX ADMIN — ROCURONIUM BROMIDE 30 MG: 10 INJECTION, SOLUTION INTRAVENOUS at 07:01

## 2021-07-28 RX ADMIN — SODIUM CHLORIDE, POTASSIUM CHLORIDE, SODIUM LACTATE AND CALCIUM CHLORIDE: 600; 310; 30; 20 INJECTION, SOLUTION INTRAVENOUS at 07:44

## 2021-07-28 RX ADMIN — FENTANYL CITRATE 50 MCG: 50 INJECTION, SOLUTION INTRAMUSCULAR; INTRAVENOUS at 08:02

## 2021-07-28 RX ADMIN — HYDROMORPHONE HYDROCHLORIDE 0.5 MG: 1 INJECTION, SOLUTION INTRAMUSCULAR; INTRAVENOUS; SUBCUTANEOUS at 08:37

## 2021-07-28 RX ADMIN — ACETAMINOPHEN 650 MG: 325 TABLET ORAL at 17:01

## 2021-07-28 RX ADMIN — HYDROMORPHONE HYDROCHLORIDE 1 MG: 1 INJECTION, SOLUTION INTRAMUSCULAR; INTRAVENOUS; SUBCUTANEOUS at 02:14

## 2021-07-28 RX ADMIN — FENTANYL CITRATE 100 MCG: 0.05 INJECTION, SOLUTION INTRAMUSCULAR; INTRAVENOUS at 01:35

## 2021-07-28 RX ADMIN — HYDROMORPHONE HYDROCHLORIDE 1 MG: 1 INJECTION, SOLUTION INTRAMUSCULAR; INTRAVENOUS; SUBCUTANEOUS at 23:12

## 2021-07-28 RX ADMIN — METRONIDAZOLE 500 MG: 500 INJECTION, SOLUTION INTRAVENOUS at 14:36

## 2021-07-28 RX ADMIN — FENTANYL CITRATE 150 MCG: 50 INJECTION, SOLUTION INTRAMUSCULAR; INTRAVENOUS at 06:53

## 2021-07-28 RX ADMIN — HYDROMORPHONE HYDROCHLORIDE 1 MG: 1 INJECTION, SOLUTION INTRAMUSCULAR; INTRAVENOUS; SUBCUTANEOUS at 12:30

## 2021-07-28 RX ADMIN — METRONIDAZOLE 500 MG: 500 INJECTION, SOLUTION INTRAVENOUS at 07:00

## 2021-07-28 RX ADMIN — SODIUM CHLORIDE 1000 ML: 9 INJECTION, SOLUTION INTRAVENOUS at 00:46

## 2021-07-28 RX ADMIN — ROCURONIUM BROMIDE 10 MG: 10 INJECTION, SOLUTION INTRAVENOUS at 07:26

## 2021-07-28 RX ADMIN — SODIUM CHLORIDE, POTASSIUM CHLORIDE, SODIUM LACTATE AND CALCIUM CHLORIDE: 600; 310; 30; 20 INJECTION, SOLUTION INTRAVENOUS at 05:31

## 2021-07-28 RX ADMIN — HYDROMORPHONE HYDROCHLORIDE 0.5 MG: 1 INJECTION, SOLUTION INTRAMUSCULAR; INTRAVENOUS; SUBCUTANEOUS at 09:22

## 2021-07-28 RX ADMIN — ESMOLOL HYDROCHLORIDE 10 MG: 10 INJECTION, SOLUTION INTRAVENOUS at 07:03

## 2021-07-28 RX ADMIN — SODIUM CHLORIDE 3000 MG: 900 INJECTION INTRAVENOUS at 16:38

## 2021-07-28 RX ADMIN — IOPAMIDOL 75 ML: 755 INJECTION, SOLUTION INTRAVENOUS at 01:05

## 2021-07-28 RX ADMIN — HYDROMORPHONE HYDROCHLORIDE 0.5 MG: 1 INJECTION, SOLUTION INTRAMUSCULAR; INTRAVENOUS; SUBCUTANEOUS at 05:26

## 2021-07-28 RX ADMIN — PIPERACILLIN AND TAZOBACTAM 3375 MG: 3; .375 INJECTION, POWDER, LYOPHILIZED, FOR SOLUTION INTRAVENOUS at 01:37

## 2021-07-28 RX ADMIN — FENTANYL CITRATE 50 MCG: 50 INJECTION, SOLUTION INTRAMUSCULAR; INTRAVENOUS at 07:28

## 2021-07-28 RX ADMIN — Medication 0.6 MG: at 08:00

## 2021-07-28 RX ADMIN — ONDANSETRON 4 MG: 2 INJECTION INTRAMUSCULAR; INTRAVENOUS at 07:56

## 2021-07-28 RX ADMIN — Medication 140 MG: at 06:53

## 2021-07-28 RX ADMIN — SODIUM CHLORIDE 1000 ML: 9 INJECTION, SOLUTION INTRAVENOUS at 03:55

## 2021-07-28 RX ADMIN — PROPOFOL 200 MG: 10 INJECTION, EMULSION INTRAVENOUS at 06:53

## 2021-07-28 RX ADMIN — LIDOCAINE HYDROCHLORIDE 100 MG: 20 INJECTION, SOLUTION EPIDURAL; INFILTRATION; INTRACAUDAL; PERINEURAL at 06:53

## 2021-07-28 RX ADMIN — WATER 1000 MG: 1 INJECTION INTRAMUSCULAR; INTRAVENOUS; SUBCUTANEOUS at 06:59

## 2021-07-28 RX ADMIN — HYDROMORPHONE HYDROCHLORIDE 1 MG: 1 INJECTION, SOLUTION INTRAMUSCULAR; INTRAVENOUS; SUBCUTANEOUS at 19:55

## 2021-07-28 RX ADMIN — FENTANYL CITRATE 50 MCG: 50 INJECTION, SOLUTION INTRAMUSCULAR; INTRAVENOUS at 00:47

## 2021-07-28 RX ADMIN — HYDROMORPHONE HYDROCHLORIDE 1 MG: 1 INJECTION, SOLUTION INTRAMUSCULAR; INTRAVENOUS; SUBCUTANEOUS at 16:06

## 2021-07-28 RX ADMIN — HYDROMORPHONE HYDROCHLORIDE 1 MG: 1 INJECTION, SOLUTION INTRAMUSCULAR; INTRAVENOUS; SUBCUTANEOUS at 03:54

## 2021-07-28 RX ADMIN — FENTANYL CITRATE 100 MCG: 50 INJECTION, SOLUTION INTRAMUSCULAR; INTRAVENOUS at 06:44

## 2021-07-28 RX ADMIN — SODIUM CHLORIDE, POTASSIUM CHLORIDE, SODIUM LACTATE AND CALCIUM CHLORIDE: 600; 310; 30; 20 INJECTION, SOLUTION INTRAVENOUS at 14:38

## 2021-07-28 RX ADMIN — ONDANSETRON 4 MG: 2 INJECTION INTRAMUSCULAR; INTRAVENOUS at 00:48

## 2021-07-28 RX ADMIN — SODIUM CHLORIDE 3000 MG: 900 INJECTION INTRAVENOUS at 21:26

## 2021-07-28 RX ADMIN — FENTANYL CITRATE 50 MCG: 50 INJECTION, SOLUTION INTRAMUSCULAR; INTRAVENOUS at 07:48

## 2021-07-28 RX ADMIN — Medication 3 MG: at 08:00

## 2021-07-28 RX ADMIN — SODIUM CHLORIDE, PRESERVATIVE FREE 10 ML: 5 INJECTION INTRAVENOUS at 01:05

## 2021-07-28 RX ADMIN — ACETAMINOPHEN 650 MG: 325 TABLET ORAL at 21:26

## 2021-07-28 RX ADMIN — HYDROMORPHONE HYDROCHLORIDE 0.5 MG: 1 INJECTION, SOLUTION INTRAMUSCULAR; INTRAVENOUS; SUBCUTANEOUS at 08:27

## 2021-07-28 RX ADMIN — DEXAMETHASONE SODIUM PHOSPHATE 10 MG: 4 INJECTION, SOLUTION INTRAMUSCULAR; INTRAVENOUS at 07:14

## 2021-07-28 RX ADMIN — LABETALOL HYDROCHLORIDE 5 MG: 5 INJECTION INTRAVENOUS at 07:25

## 2021-07-28 ASSESSMENT — PULMONARY FUNCTION TESTS
PIF_VALUE: 2
PIF_VALUE: 36
PIF_VALUE: 40
PIF_VALUE: 17
PIF_VALUE: 9
PIF_VALUE: 39
PIF_VALUE: 18
PIF_VALUE: 40
PIF_VALUE: 8
PIF_VALUE: 40
PIF_VALUE: 40
PIF_VALUE: 30
PIF_VALUE: 8
PIF_VALUE: 40
PIF_VALUE: 2
PIF_VALUE: 40
PIF_VALUE: 9
PIF_VALUE: 40
PIF_VALUE: 40
PIF_VALUE: 26
PIF_VALUE: 35
PIF_VALUE: 31
PIF_VALUE: 40
PIF_VALUE: 13
PIF_VALUE: 31
PIF_VALUE: 36
PIF_VALUE: 39
PIF_VALUE: 40
PIF_VALUE: 40
PIF_VALUE: 6
PIF_VALUE: 9
PIF_VALUE: 40
PIF_VALUE: 31
PIF_VALUE: 31
PIF_VALUE: 36
PIF_VALUE: 40
PIF_VALUE: 1
PIF_VALUE: 17
PIF_VALUE: 40
PIF_VALUE: 30
PIF_VALUE: 27
PIF_VALUE: 27
PIF_VALUE: 11
PIF_VALUE: 32
PIF_VALUE: 40
PIF_VALUE: 29
PIF_VALUE: 42
PIF_VALUE: 40
PIF_VALUE: 36
PIF_VALUE: 40
PIF_VALUE: 40
PIF_VALUE: 5
PIF_VALUE: 28
PIF_VALUE: 30
PIF_VALUE: 16
PIF_VALUE: 44
PIF_VALUE: 5
PIF_VALUE: 16
PIF_VALUE: 4
PIF_VALUE: 24
PIF_VALUE: 31
PIF_VALUE: 27
PIF_VALUE: 39
PIF_VALUE: 28

## 2021-07-28 ASSESSMENT — PAIN DESCRIPTION - ORIENTATION
ORIENTATION: LOWER;RIGHT
ORIENTATION: LOWER;RIGHT
ORIENTATION: RIGHT;LOWER
ORIENTATION: LOWER

## 2021-07-28 ASSESSMENT — PAIN DESCRIPTION - LOCATION
LOCATION: ABDOMEN

## 2021-07-28 ASSESSMENT — PAIN DESCRIPTION - DESCRIPTORS
DESCRIPTORS: ACHING;DISCOMFORT;SHARP
DESCRIPTORS: CONSTANT;SHARP;SHOOTING
DESCRIPTORS: ACHING;DISCOMFORT;SHARP
DESCRIPTORS: CONSTANT;SHARP
DESCRIPTORS: STABBING;SHARP
DESCRIPTORS: CONSTANT;ACHING

## 2021-07-28 ASSESSMENT — PAIN DESCRIPTION - PROGRESSION
CLINICAL_PROGRESSION: GRADUALLY IMPROVING
CLINICAL_PROGRESSION: GRADUALLY WORSENING
CLINICAL_PROGRESSION: NOT CHANGED
CLINICAL_PROGRESSION: GRADUALLY WORSENING
CLINICAL_PROGRESSION: GRADUALLY WORSENING
CLINICAL_PROGRESSION: RAPIDLY WORSENING

## 2021-07-28 ASSESSMENT — PAIN SCALES - GENERAL
PAINLEVEL_OUTOF10: 3
PAINLEVEL_OUTOF10: 5
PAINLEVEL_OUTOF10: 4
PAINLEVEL_OUTOF10: 5
PAINLEVEL_OUTOF10: 4
PAINLEVEL_OUTOF10: 10
PAINLEVEL_OUTOF10: 9
PAINLEVEL_OUTOF10: 7
PAINLEVEL_OUTOF10: 8
PAINLEVEL_OUTOF10: 10
PAINLEVEL_OUTOF10: 9
PAINLEVEL_OUTOF10: 6
PAINLEVEL_OUTOF10: 10
PAINLEVEL_OUTOF10: 9
PAINLEVEL_OUTOF10: 6
PAINLEVEL_OUTOF10: 4
PAINLEVEL_OUTOF10: 0
PAINLEVEL_OUTOF10: 4
PAINLEVEL_OUTOF10: 9

## 2021-07-28 ASSESSMENT — PAIN DESCRIPTION - PAIN TYPE
TYPE: ACUTE PAIN;SURGICAL PAIN
TYPE: SURGICAL PAIN
TYPE: ACUTE PAIN;SURGICAL PAIN
TYPE: ACUTE PAIN;SURGICAL PAIN
TYPE: ACUTE PAIN
TYPE: ACUTE PAIN
TYPE: SURGICAL PAIN

## 2021-07-28 ASSESSMENT — PAIN DESCRIPTION - FREQUENCY
FREQUENCY: CONTINUOUS

## 2021-07-28 ASSESSMENT — PAIN - FUNCTIONAL ASSESSMENT
PAIN_FUNCTIONAL_ASSESSMENT: PREVENTS OR INTERFERES SOME ACTIVE ACTIVITIES AND ADLS
PAIN_FUNCTIONAL_ASSESSMENT: ACTIVITIES ARE NOT PREVENTED
PAIN_FUNCTIONAL_ASSESSMENT: PREVENTS OR INTERFERES SOME ACTIVE ACTIVITIES AND ADLS
PAIN_FUNCTIONAL_ASSESSMENT: PREVENTS OR INTERFERES SOME ACTIVE ACTIVITIES AND ADLS
PAIN_FUNCTIONAL_ASSESSMENT: ACTIVITIES ARE NOT PREVENTED
PAIN_FUNCTIONAL_ASSESSMENT: ACTIVITIES ARE NOT PREVENTED

## 2021-07-28 ASSESSMENT — PAIN DESCRIPTION - ONSET
ONSET: ON-GOING

## 2021-07-28 ASSESSMENT — LIFESTYLE VARIABLES: SMOKING_STATUS: 0

## 2021-07-28 NOTE — ED NOTES
IV attempted without success (Blood return- but some swelling noted)     Orville Ashton, RN  07/28/21 4584

## 2021-07-28 NOTE — CONSULTS
[address] [place] [address] [facility]  Consultation Note     Admit Date: 7/28/2021  5:03 AM    Reason for Consult:   Perforated appendectomy. Status post diagnostic laparoscopy and drain placement. Cultures taken    Attending Physician:  Prasanth Arredondo, *    HISTORY OF PRESENT ILLNESS:             The history is obtained from extensive review of available past medical records. The patient is a 22 y.o. female who is not previously known to the ID service. She presents to the ED on 7/27/2021 with generalized abdominal pain x1 day, associated with nausea and vomiting. CT of the abdomen pelvis revealed appendicitis. She was taken to the OR. The procedure had to be aborted because of the inflammatory process and inability to find the appendix. Past Medical History:        Diagnosis Date    Hyperthyroidism     Seizures ([address]     as young but placed on no treatment    Trauma      Past Surgical History:        Procedure Laterality Date    FOOT SURGERY      wart removal    LARYNGOSCOPY       Current Medications:   Scheduled Meds:   sodium chloride flush  5-40 mL Intravenous 2 times per day    [Held by provider] enoxaparin  40 mg Subcutaneous Daily    cefTRIAXone (ROCEPHIN) IV  1,000 mg Intravenous Q24H    metroNIDAZOLE  500 mg Intravenous Q8H     Continuous Infusions:   sodium chloride      lactated ringers 125 mL/hr at 07/28/21 1438     PRN Meds:sodium chloride flush, sodium chloride, ondansetron **OR** ondansetron, acetaminophen, HYDROmorphone **OR** HYDROmorphone    Allergies:  Patient has no known allergies.     Social History:   Social History     Socioeconomic History    Marital status: Single     Spouse name: Not on file    Number of children: Not on file    Years of education: Not on file    Highest education level: Not on file   Occupational History    Not on file   Tobacco Use    Smoking status: Never Smoker    Smokeless tobacco: Never Used   Vaping Use    Vaping Use: Never used   Substance and Sexual Activity    Alcohol use: No    Drug use: No    Sexual activity: Not Currently     Partners: Male   Other Topics Concern    Not on file   Social History Narrative    Not on file     Social Determinants of Health     Financial Resource Strain:     Difficulty of Paying Living Expenses:    Food Insecurity:     Worried About Running Out of Food in the Last Year:     [address] in the Last Year:    Transportation Needs:     Lack of Transportation (Medical):  Lack of Transportation (Non-Medical):    Physical Activity:     Days of Exercise per Week:     Minutes of Exercise per Session:    Stress:     Feeling of Stress :    Social Connections:     Frequency of Communication with Friends and Family:     Frequency of Social Gatherings with Friends and Family:     Attends L[employer] Services:     Active Member of Clubs or Organizations:     Attends Club or Organization Meetings:     Marital Status:    Intimate Partner Violence:     Fear of Current or Ex-Partner:     Emotionally Abused:     Physically Abused:     Sexually Abused:       Pets: No  Travel: No  The patient lives at home with her extended family. 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 Otherwise non-pertinent to the chief complaint. REVIEW OF SYSTEMS:    Constitutional: Negative for fevers, chills, diaphoresis  Neurologic: Negative   Psychiatric: Negative  Rheumatologic: Negative   Endocrine: Negative  Hematologic: Negative  Immunologic: Negative. He has not received a SARS-CoV-2 vaccine  ENT: Negative  Respiratory: Negative   Cardiovascular: Negative  GI: Negative  : Negative  Musculoskeletal: Negative  Skin: No rashes. PHYSICAL EXAM:    Vitals:   /82   Pulse 144   Temp 98 °F (36.7 °C) (Oral)   Resp 20   Ht 5' 2\" (1.575 m)   Wt 263 lb 6.4 oz (119.5 kg)   LMP 07/22/2021   SpO2 92%   BMI 48.18 kg/m²   Constitutional: The patient is awake, alert, and oriented. She looks acutely ill and is in some distress because of pain. Skin: Warm and dry. No rashes were noted. HEENT: Eyes show round, and reactive pupils. No jaundice. Moist mucous membranes, no ulcerations, no thrush. Neck: Supple to movements. No lymphadenopathy. Chest: No use of accessory muscles to breathe. Symmetrical expansion. Auscultation reveals no wheezing, crackles, or rhonchi. Cardiovascular: S1 and S2 are rhythmic and regular. No murmurs appreciated. Abdomen: Absent bowel sounds. Laparoscopic surgical wounds with glue. MARY ANN drain with serosanguineous fluid. I did not push on her abdomen in deference to pain. Extremities: No clubbing, no cyanosis, no edema. Lines: peripheral      CBC+dif:  Recent Labs     07/27/21  1848 07/27/21  1848 07/28/21  0030   WBC 15.0*  --  13.8*   HGB 13.6   < > 13.3   HCT 40.7   < > 39.1   MCV 83.2   < > 83.2      < > 298   NEUTROABS 12.29*   < > 11.60*    < > = values in this interval not displayed.      No results found for: CRP   No results found for: CRPHS  No results found for: SEDRATE  Lab Results   Component Value Date    ALT 26 07/28/2021    AST 20 07/28/2021    ALKPHOS 172 (H) 07/28/2021    BILITOT 1.1 07/28/2021     Lab Results   Component Value Date     07/28/2021    K 4.2 07/28/2021     07/28/2021    CO2 24 07/28/2021    BUN 14 07/28/2021    CREATININE 0.5 07/28/2021    GFRAA >60 07/28/2021    LABGLOM >60 07/28/2021    GLUCOSE 132 07/28/2021    PROT 7.8 07/28/2021    LABALBU 3.8 07/28/2021    CALCIUM 9.7 07/28/2021    BILITOT 1.1 07/28/2021    ALKPHOS 172 07/28/2021    AST 20 07/28/2021    ALT 26 07/28/2021       No results found for: PROTIME, INR    Lab Results   Component Value Date    TSH <0.010 07/29/2020       Lab Results   Component Value Date    COLORU Yellow 07/27/2021    PHUR 5.5 07/27/2021    WBCUA 1-3 07/27/2021    RBCUA 2-5 07/27/2021    MUCUS Present 07/27/2021    BACTERIA MODERATE 07/27/2021    CLARITYU Clear 07/27/2021    SPECGRAV >=1.030 07/27/2021    LEUKOCYTESUR Negative 07/27/2021    UROBILINOGEN 0.2 07/27/2021    BILIRUBINUR SMALL 07/27/2021    BLOODU LARGE 07/27/2021    GLUCOSEU Negative 07/27/2021    AMORPHOUS MANY 01/22/2015       No results found for: HCO3, BE, O2SAT, PH, THGB, PCO2, PO2, TCO2  Radiology:  CT reviewed    Microbiology:  Pending  No results for input(s): BC in the last 72 hours. No results for input(s): ORG in the last 72 hours. No results for input(s): Gevena Ronal in the last 72 hours. No results for input(s): STREPNEUMAGU in the last 72 hours. No results for input(s): LP1UAG in the last 72 hours. No results for input(s): ASO in the last 72 hours. No results for input(s): CULTRESP in the last 72 hours. No results for input(s): PROCAL in the last 72 hours. Assessment:  · Acute appendicitis  · Status post attempted laparoscopic appendectomy  · Leukocytosis associated to appendicitis    Plan:    · Stop Ceftriaxone and Metronidazole  · Start Unasyn 3 g IV every 8 hours  · Check cultures, baseline ESR, CRP  · Monitor labs  · Will follow with you    Thank you for having us see this patient in consultation. I will be discussing this case with the treating physicians.     Sergey Bellamy MD  2:51 PM  7/28/2021

## 2021-07-28 NOTE — ED NOTES
Radiology Procedure Waiver   Name: Elder Taylor  : 1995  MRN: [id number]    Date:  21    Time: 12:11 AM EDT    Benefits of immediately proceeding with Radiology exam(s) without pre-testing outweigh the risks or are not indicated as specified below and therefore the following is/are being waived:    [] Pregnancy test   [] Patients LMP on-time and regular.   [] Patient had Tubal Ligation or has other Contraception Device. [] Patient  is Menopausal or Premenarcheal.    [] Patient had Full or Partial Hysterectomy. [] Protocol for Iodine allergy    [] MRI Questionnaire     [] BUN/Creatinine   [] Patient age w/no hx of renal dysfunction. [] Patient on Dialysis. [x] Recent Normal Labs.   Electronically signed by Camilla Staley MD on 21 at 12:11 AM EDT        Pt had labs today with normal creatinine     Camilla Staley MD  [phone]

## 2021-07-28 NOTE — PROGRESS NOTES
ID consult called to office 10:52 AM. Office staff states she will callback with which physician will take consult. Awaiting callback.

## 2021-07-28 NOTE — PROGRESS NOTES
Dr. Tierra Choi updated on patient's current VS, tachy in the 130's no new orders we will continue to monitor.

## 2021-07-28 NOTE — ANESTHESIA POSTPROCEDURE EVALUATION
Department of Anesthesiology  Postprocedure Note    Patient: Magen Gibson  MRN: [id number]  YOB: 1995  Date of evaluation: 7/28/2021  Time:  9:28 AM     Procedure Summary     Date: 07/28/21 Room / Location: [address]    Anesthesia Start: 6447 Anesthesia Stop: 9182    Procedures:       APPENDECTOMY LAPAROSCOPIC (N/A Abdomen)      Procedure Not Yet Scheduled Diagnosis: (/)    Surgeons: Nehal Slater MD Responsible Provider: Jessica Mcmahon MD    Anesthesia Type: general ASA Status: 3 - Emergent          Anesthesia Type: general    Prasanna Phase I: Prasanna Score: 9    Prasanna Phase II:      Last vitals: Reviewed and per EMR flowsheets.        Anesthesia Post Evaluation    Patient location during evaluation: PACU  Patient participation: complete - patient participated  Level of consciousness: awake and alert  Pain score: 5  Airway patency: patent  Nausea & Vomiting: no vomiting and no nausea  Complications: no  Cardiovascular status: hemodynamically stable  Respiratory status: spontaneous ventilation  Hydration status: stable

## 2021-07-28 NOTE — ED PROVIDER NOTES
HPI:  7/28/21,   Time: 12:12 AM EDT       Viky Davidson is a 22 y.o. female presenting to the ED for abd pain/nv/d/, beginning 1 day ago. The complaint has been persistent, moderate in severity, and worsened by nothing. Sharp pain, diffuse in abd. No hx same. Nothing makes better. No fever/chills/seats/cough/congestion/recent abx. No abd surgeries. Was downtown earlier, but wait too long, had labs, wbc 15. Bib  Private vehicle    Review of Systems:   Pertinent positives and negatives are stated within HPI, all other systems reviewed and are negative.          --------------------------------------------- PAST HISTORY ---------------------------------------------  Past Medical History:  has a past medical history of Hyperthyroidism, Seizures ([facility] [address] and Trauma. Past Surgical History:  has a past surgical history that includes laryngoscopy; Foot surgery; and laparoscopic appendectomy (N/A, 7/28/2021). Social History:  reports that she has never smoked. She has never used smokeless tobacco. She reports that she does not drink alcohol and does not use drugs. Family History: family history includes Diabetes in her maternal grandfather and maternal grandmother; Heart Failure in her maternal grandfather; Other in her mother. The patients home medications have been reviewed. Allergies: Patient has no known allergies.         ---------------------------------------------------PHYSICAL EXAM--------------------------------------    Constitutional/General: Alert and oriented x3, well appearing, non toxic in NAD  Head: Normocephalic and atraumatic  Eyes: PERRL, EOMI, conjunctive normal, sclera non icteric  Mouth: Oropharynx clear, handling secretions, no trismus, no asymmetry of the posterior oropharynx or uvular edema  Neck: Supple, full ROM, n  Respiratory: Not in respiratory distress  Cardiovascular:   2+ distal pulses  Chest: No chest wall tenderness  GI:  Abdomen Soft, diffuse upper abd ttp, Non distended. +BS. No organomegaly, no palpable masses,  No rebound, guarding, or rigidity. Musculoskeletal: Moves all extremities x 4. Warm and well perfused, no clubbing, cyanosis, or edema. Capillary refill <3 seconds  Integument: skin warm and dry. No rashes. Lymphatic: no lymphadenopathy noted  Neurologic: GCS 15, no focal deficits, symmetric strength 5/5 in the upper and lower extremities bilaterally  Psychiatric: Normal Affect    -------------------------------------------------- RESULTS -------------------------------------------------  I have personally reviewed all laboratory and imaging results for this patient. Results are listed below.      LABS:  Results for orders placed or performed during the hospital encounter of 07/27/21   Pregnancy, Urine   Result Value Ref Range    HCG(Urine) Pregnancy Test NEGATIVE NEGATIVE   Urinalysis with Microscopic   Result Value Ref Range    Color, UA Yellow Straw/Yellow    Clarity, UA Clear Clear    Glucose, Ur Negative Negative mg/dL    Bilirubin Urine SMALL (A) Negative    Ketones, Urine 15 (A) Negative mg/dL    Specific Gravity, UA >=1.030 1.005 - 1.030    Blood, Urine LARGE (A) Negative    pH, UA 5.5 5.0 - 9.0    Protein, UA TRACE Negative mg/dL    Urobilinogen, Urine 0.2 <2.0 E.U./dL    Nitrite, Urine Negative Negative    Leukocyte Esterase, Urine Negative Negative    WBC, UA 1-3 0 - 5 /HPF    RBC, UA 2-5 0 - 2 /HPF    Epithelial Cells, UA MANY /HPF    Bacteria, UA MODERATE (A) None Seen /HPF   CBC Auto Differential   Result Value Ref Range    WBC 13.8 (H) 4.5 - 11.5 E9/L    RBC 4.70 3.50 - 5.50 E12/L    Hemoglobin 13.3 11.5 - 15.5 g/dL    Hematocrit 39.1 34.0 - 48.0 %    MCV 83.2 80.0 - 99.9 fL    MCH 28.3 26.0 - 35.0 pg    MCHC 34.0 32.0 - 34.5 %    RDW 12.6 11.5 - 15.0 fL    Platelets 899 482 - 886 E9/L    MPV 10.6 7.0 - 12.0 fL    Neutrophils % 84.4 (H) 43.0 - 80.0 %    Immature Granulocytes % 0.4 0.0 - 5.0 %    Lymphocytes % 10.8 (L) 20.0 - 42.0 %    Monocytes % 4.2 2.0 - 12.0 %    Eosinophils % 0.1 0.0 - 6.0 %    Basophils % 0.1 0.0 - 2.0 %    Neutrophils Absolute 11.60 (H) 1.80 - 7.30 E9/L    Immature Granulocytes # 0.06 E9/L    Lymphocytes Absolute 1.49 (L) 1.50 - 4.00 E9/L    Monocytes Absolute 0.58 0.10 - 0.95 E9/L    Eosinophils Absolute 0.01 (L) 0.05 - 0.50 E9/L    Basophils Absolute 0.02 0.00 - 0.20 E9/L   Comprehensive Metabolic Panel w/ Reflex to MG   Result Value Ref Range    Sodium 138 132 - 146 mmol/L    Potassium reflex Magnesium 4.2 3.5 - 5.0 mmol/L    Chloride 102 98 - 107 mmol/L    CO2 24 22 - 29 mmol/L    Anion Gap 12 7 - 16 mmol/L    Glucose 132 (H) 74 - 99 mg/dL    BUN 14 6 - 20 mg/dL    CREATININE 0.5 0.5 - 1.0 mg/dL    GFR Non-African American >60 >=60 mL/min/1.73    GFR African American >60     Calcium 9.7 8.6 - 10.2 mg/dL    Total Protein 7.8 6.4 - 8.3 g/dL    Albumin 3.8 3.5 - 5.2 g/dL    Total Bilirubin 1.1 0.0 - 1.2 mg/dL    Alkaline Phosphatase 172 (H) 35 - 104 U/L    ALT 26 0 - 32 U/L    AST 20 0 - 31 U/L       RADIOLOGY:  Interpreted by Radiologist.  CT ABDOMEN PELVIS W IV CONTRAST Additional Contrast? None   Final Result   Acute appendicitis. No free air or periappendiceal abscess. I discussed the findings by phone with Dr. Caleb Morrell at 1:27 am on   7/28/2021. EKG:  This EKG is signed and interpreted by the EP. Time:   Rate:   Rhythm:   Interpretation:   Comparison:       ------------------------- NURSING NOTES AND VITALS REVIEWED ---------------------------   The nursing notes within the ED encounter and vital signs as below have been reviewed by myself. /74   Pulse 100   Temp 98.2 °F (36.8 °C)   Resp 16   Ht 5' 2\" (1.575 m)   Wt 225 lb (102.1 kg)   LMP 07/22/2021   SpO2 98%   BMI 41.15 kg/m²   Oxygen Saturation Interpretation: Normal    The patients available past medical records and past encounters were reviewed.         ------------------------------ ED COURSE/MEDICAL DECISION MAKING----------------------  Medications   0.9 % sodium chloride bolus (0 mLs Intravenous Stopped 7/28/21 0355)   ondansetron (ZOFRAN) injection 4 mg (4 mg Intravenous Given 7/28/21 0048)   fentaNYL (SUBLIMAZE) injection 50 mcg (50 mcg Intravenous Given 7/28/21 0047)   iopamidol (ISOVUE-370) 76 % injection 75 mL (75 mLs Intravenous Given 7/28/21 0105)   sodium chloride flush 0.9 % injection 10 mL (10 mLs Intravenous Given 7/28/21 0105)   fentaNYL (SUBLIMAZE) injection 100 mcg (100 mcg Intravenous Given 7/28/21 0135)   piperacillin-tazobactam (ZOSYN) 3,375 mg in dextrose 5 % 100 mL IVPB (mini-bag) (0 mg Intravenous Stopped 7/28/21 0207)   HYDROmorphone (DILAUDID) injection 1 mg (1 mg Intravenous Given 7/28/21 0214)   HYDROmorphone (DILAUDID) injection 1 mg (1 mg Intravenous Given 7/28/21 0354)         ED COURSE:       Medical Decision Making:    Pt ct with cristopher, transfer to [place], discussed with armando, will accept      This patient's ED course included: a personal history and physicial examination    This patient has remained hemodynamically stable during their ED course. Re-Evaluations:             Re-evaluation. Patients symptoms show no change    Re-examination  7/28/21   12:12 AM EDT          Vital Signs:   Vitals:    07/28/21 0124 07/28/21 0209 07/28/21 0343 07/28/21 0422   BP:  104/70 110/74 110/74   Pulse: 108 120 100 100   Resp: 16 20 16 16   Temp:   98.2 °F (36.8 °C) 98.2 °F (36.8 °C)   TempSrc:   Temporal    SpO2: 98% 97% 98% 98%   Weight:       Height:               Consultations:             Dr Angela Myles    Critical Care:         Counseling: The emergency provider has spoken with the patient and discussed todays results, in addition to providing specific details for the plan of care and counseling regarding the diagnosis and prognosis.   Questions are answered at this time and they are agreeable with the plan.       --------------------------------- IMPRESSION AND DISPOSITION ---------------------------------    IMPRESSION  1. Acute appendicitis with generalized peritonitis, without gangrene or abscess, unspecified whether perforation present        DISPOSITION  Disposition: Transfer to [facility] to med[facility]surg  Patient condition is stable    NOTE: This report was transcribed using voice recognition software.  Every effort was made to ensure accuracy; however, inadvertent computerized transcription errors may be present        Екатерина Ruby MD  07/30/21 [id number]

## 2021-07-28 NOTE — CARE COORDINATION
Met with patient about diagnosis and discharge plan of care. Pt lives with spouse, children and cousins. Independent prior to admit, no DME or home care services. Pt admit for appendicitis. Post op exp lap with wash out and drain placement for perforated appendicitis. ID consult. Discharge planning TBD pending possible need for IV antibiotics and home care. Pt has NO PCP but does follow at the [facility].  Will follow-O

## 2021-07-28 NOTE — H&P
General Surgery  Attending History and Physical    Patient's Name/Date of Birth: Marybel Roper / 1995    Date: 7/28/2021    PCP/Referring Physician: Beverly Carlisle DO      CHIEF COMPLAINT:  No chief complaint on file. HISTORY OF PRESENT ILLNESS:    Marybel Roper is an 22 y.o. female who presents with RLQ pain since yesterday. She has associated nausea and vomiting. She said the pain has worsened since it began. She has never had this kind of pain before. Past Medical History:   Past Medical History:   Diagnosis Date    Hyperthyroidism     Seizures ([address]     as young but placed on no treatment    Trauma         Past Surgical History:   Past Surgical History:   Procedure Laterality Date    FOOT SURGERY      wart removal    LARYNGOSCOPY          Allergies: Patient has no known allergies.      Medications:   Current Facility-Administered Medications   Medication Dose Route Frequency Provider Last Rate Last Admin    sodium chloride flush 0.9 % injection 5-40 mL  5-40 mL Intravenous 2 times per day Denisse Phan MD        sodium chloride flush 0.9 % injection 5-40 mL  5-40 mL Intravenous PRN Denisse Phan MD        0.9 % sodium chloride infusion  25 mL Intravenous PRN Denisse Phan MD        ondansetron (ZOFRAN-ODT) disintegrating tablet 4 mg  4 mg Oral Q8H PRN Denisse Phan MD        Or    [facility]ron [facility]) injection 4 mg  4 mg Intravenous Q6H PRN Denisse Phan MD        enoxaparin (LOVENOX) injection 40 mg  40 mg Subcutaneous Daily Denisse Phan MD        acetaminophen (TYLENOL) tablet 650 mg  650 mg Oral Q4H PRN Denisse Phan MD        HYDROmorphone (DILAUDID) injection 0.25 mg  0.25 mg Intravenous Q3H PRN Denisse Phan MD        Or    HYDROmorphone (DILAUDID) injection 0.5 mg  0.5 mg Intravenous Q3H PRN Denisse Phan MD   0.5 mg at 07/28/21 0552    lactated ringers infusion   Intravenous Continuous Denisse Phan  mL/hr at 07/28/21 0531 New Bag at 07/28/21 2170  cefTRIAXone (ROCEPHIN) 1,000 mg in sterile water 10 mL IV syringe  1,000 mg Intravenous Q24H Ugo Jarvis MD        metronidazole (FLAGYL) 500 mg in NaCl 100 mL IVPB premix  500 mg Intravenous Q8H Ugo Jarvis MD        meperidine (DEMEROL) injection 12.5 mg  12.5 mg Intravenous Q10 Min PRN Rutherford Kocher., MD        fentaNYL (SUBLIMAZE) injection 25 mcg  25 mcg Intravenous Q5 Min PRN Rutherford Kocher., MD        HYDROmorphone (DILAUDID) injection 0.5 mg  0.5 mg Intravenous Q5 Min PRN Rutherford Kocher., MD        oxyCODONE-acetaminophen (PERCOCET) 5-325 MG per tablet 1 tablet  1 tablet Oral PRN Rutherford Kocher., MD        promethazine [facility]) injection 6.25 mg  6.25 mg Intravenous PRN Rutherford Kocher., MD        diphenhydrAMINE (BENADRYL) injection 12.5 mg  12.5 mg Intravenous Once PRN Rutherford Kocher., MD         Facility-Administered Medications Ordered in Other Encounters   Medication Dose Route Frequency Provider Last Rate Last Admin    ondansetron (ZOFRAN-ODT) disintegrating tablet 4 mg  4 mg Oral Once [employer], PA-C        0.9 % sodium chloride bolus  1,000 mL Intravenous Once [employer], PA-C             Social History:   Social History     Tobacco Use    Smoking status: Never Smoker    Smokeless tobacco: Never Used   Substance Use Topics    Alcohol use: No        Family History:   Family History   Problem Relation Age of Onset    Other Mother         colitis    Diabetes Maternal Grandmother     Diabetes Maternal Grandfather     Heart Failure Maternal Grandfather     Thyroid Cancer Neg Hx     Hyperthyroidism Neg Hx        Medications Prior to Admission:   No medications prior to admission.     REVIEW OF SYSTEMS:    Constitutional: negative  Eyes: negative  Ears, nose, mouth, throat, and face: negative  Respiratory: negative  Cardiovascular: negative  Gastrointestinal: as in hpi  Genitourinary:negative  Integument/breast: negative  Hematologic/lymphatic: negative  Musculoskeletal:negative  Neurological: negative  Allergic/Immunologic: negative    PHYSICAL EXAM   /76   Pulse 131   Temp 98.2 °F (36.8 °C) (Oral)   Resp 25   Ht 5' 2\" (1.575 m)   Wt 263 lb 6.4 oz (119.5 kg)   LMP 07/22/2021   SpO2 97%   BMI 48.18 kg/m²     General appearance: alert, cooperative and in no acute distress. Head: NCAT  Eyes: no scleral icterus  Chest: no skin abnormalities, no masses  Lungs: normal work of breathing  Heart: regular rate  Abdomen: RLQ tenderness  Skin: No skin abnormalities  Neurologic: Alert and oriented x 3. Grossly normal  Musculoskeletal: No edema. LABS:  CBC with Differential:    Lab Results   Component Value Date    WBC 13.8 07/28/2021    RBC 4.70 07/28/2021    HGB 13.3 07/28/2021    HCT 39.1 07/28/2021     07/28/2021    MCV 83.2 07/28/2021    MCH 28.3 07/28/2021    MCHC 34.0 07/28/2021    RDW 12.6 07/28/2021    LYMPHOPCT 10.8 07/28/2021    MONOPCT 4.2 07/28/2021    BASOPCT 0.1 07/28/2021    MONOSABS 0.58 07/28/2021    LYMPHSABS 1.49 07/28/2021    EOSABS 0.01 07/28/2021    BASOSABS 0.02 07/28/2021     CT abd pelvis  Impression   Acute appendicitis.       No free air or periappendiceal abscess.             ASSESSMENT AND PLAN:     Ciarra Pascual is an 22 y.o. female who presents with acute appendicitis      Laparoscopic possible open appendectomy    I explained the risks (including but not limited to leakage from the staple line, bleeding, abscess, sepsis, bowel or major blood vessel injury, hernia), benefits, alternatives, and potential complications associated with the above procedure to be performed and transfusions when applicable with the patient/responsible person prior to the procedure. All of the patient's questions were answered. The patient understands and agrees to surgery.      Physician Signature: Electronically signed by Dr. Brittny Phelps MD, FACS    Send copy of H&P to PCP, Carly Ferrer DO

## 2021-07-28 NOTE — OP NOTE
Operative Note: Laparoscopic Appendectomy    Rafa Vanegas     DATE OF PROCEDURE: 7/28/2021  Surgeon:  Vineet Moreira):  Patrice Espino MD  PREOPERATIVE DIAGNOSIS: Acute appendicitis   POSTOPERATIVE DIAGNOSIS: Acute perforated appendicitis. OPERATION: Diagnostic laparoscopy, abdominal washout and drain placement  ANESTHESIA: General endotracheal.   Estimated Blood Loss: less than 50   Specimens:  ID Type Source Tests Collected by Time Destination   1 : ABDOMINAL FLUID Body Fluid Fluid CULTURE, ANAEROBIC, CULTURE, FUNGUS, GRAM STAIN, CULTURE, BODY FLUID, CULTURE WITH SMEAR, ACID FAST Juan Baeza MD 7/28/2021 1640         [id number]      COMPLICATIONS: None. HISTORY: Rafa Vanegas is a  22 y.o.  female, who presented to the ED with acute appendicitis. Surgery was explained to the patient, as well as risks, benefits, alternatives, and possible complications. She understood the risks involved in the surgery and wished to proceed. OPERATION: The patient was placed on the table and placed under general anesthesia. Abdomen was prepped with ChloraPrep and draped in the usual sterile fashion. A 5 mm periumbilical incision was made. A Veress needle was used to insufflate the abdomen with CO2. A 5 mm trocar was placed. There was good visualization. The bowel did appear swollen in the right lower quadrant. A 5 mm trocar was placed in the suprapubic region and a 10 mm port was placed in the LLQ. The right lateral abdomen was visualized and there was a moderate amount of purulent fluid seen. This was sent for culture. The area was washed out with warm saline until the effluent was clear. The cecum was visualized and the terminal ileum was stuck to the lateral abdominal wall. I could not easily dissect this off the wall. The bowel was very inflamed and I could not visualize the appendix.  After attempting to dissect out the cecum without success due to the degree of inflammation, it was decided to place a drain and abort. The area was again washed out with warm saline. A 5 mm port was placed in the right lateral abdomen and a 16F MARY ANN drain was placed through it. The tip of the drain was placed in the right paracolic gutter adjacent to the cecum. This was sutured in place with a Nylon suture. The abdomen was then irrigated once more. All the fluid was removed with suction. There was good hemostasis. The fascia of the LLQ port was then closed with a Macey  device with a 2-0 Vicryl suture. All trocars were removed under direct vision. The skin wounds were then closed with 4-0 Monocryl dermal stitches and skin glue dressings were applied. The needle and sponge count were correct. The patient tolerated the procedure well and went to recovery in stable condition. Physician Signature: Electronically signed by Dr. Jose Friedman MD, M.D.  FACS    Send copy of H&P to PCP, Carly Ferrer DO and referring physician, Manolo Dwyer MD

## 2021-07-28 NOTE — ANESTHESIA PRE PROCEDURE
Department of Anesthesiology  Preprocedure Note       Name:  Eloisa Keys   Age:  22 y.o.  :  1995                                          MRN:  [id number]         Date:  2021      Surgeon: Jhonatan Fitzpatrick):  Wagner Cartwright MD    Procedure: Procedure(s):  APPENDECTOMY LAPAROSCOPIC    Medications prior to admission:   Prior to Admission medications    Not on File       Current medications:    Current Facility-Administered Medications   Medication Dose Route Frequency Provider Last Rate Last Admin    sodium chloride flush 0.9 % injection 5-40 mL  5-40 mL Intravenous 2 times per day Nga Richardson MD        sodium chloride flush 0.9 % injection 5-40 mL  5-40 mL Intravenous PRN Nga Richardson MD        0.9 % sodium chloride infusion  25 mL Intravenous PRN Nga Richardson MD        ondansetron (ZOFRAN-ODT) disintegrating tablet 4 mg  4 mg Oral Q8H PRN Nga Richardson MD        Or    ondansetron [facility]) injection 4 mg  4 mg Intravenous Q6H PRN Nga Richardson MD        enoxaparin (LOVENOX) injection 40 mg  40 mg Subcutaneous Daily Nga Richardson MD        acetaminophen (TYLENOL) tablet 650 mg  650 mg Oral Q4H PRN Nga Richardson MD        HYDROmorphone (DILAUDID) injection 0.25 mg  0.25 mg Intravenous Q3H PRN Nga Richardson MD        Or    HYDROmorphone (DILAUDID) injection 0.5 mg  0.5 mg Intravenous Q3H PRN Nga Richardson MD   0.5 mg at 21 0526    lactated ringers infusion   Intravenous Continuous Nga Richardson  mL/hr at 21 0531 New Bag at 21 0531    cefTRIAXone (ROCEPHIN) 1,000 mg in sterile water 10 mL IV syringe  1,000 mg Intravenous Q24H Nga Richardson MD        metronidazole (FLAGYL) 500 mg in NaCl 100 mL IVPB premix  500 mg Intravenous Q8H Nga Richardson MD        meperidine (DEMEROL) injection 12.5 mg  12.5 mg Intravenous Q10 Min PRN Viraj Martin MD        fentaNYL (SUBLIMAZE) injection 25 mcg  25 mcg Intravenous Q5 Min PRN Viraj Martin MD        HYDROmorphone (DILAUDID) injection 0.5 mg  0.5 mg Intravenous Q5 Min PRN Jhonathan Petersen MD        oxyCODONE-acetaminophen (PERCOCET) 5-325 MG per tablet 1 tablet  1 tablet Oral PRN Jhonathan Petersen MD        promethazine [facility]) injection 6.25 mg  6.25 mg Intravenous PRN Jhonathan Petersen MD        diphenhydrAMINE (BENADRYL) injection 12.5 mg  12.5 mg Intravenous Once PRN Jhonathan Petersen MD         Facility-Administered Medications Ordered in Other Encounters   Medication Dose Route Frequency Provider Last Rate Last Admin    ondansetron (ZOFRAN-ODT) disintegrating tablet 4 mg  4 mg Oral Once [employer], PA-C        0.9 % sodium chloride bolus  1,000 mL Intravenous Once [employer], PA-C           Allergies:  No Known Allergies    Problem List:    Patient Active Problem List   Diagnosis Code    Low grade squamous intraepithelial lesion (LGSIL) at risk for high grade squamous intraepithelial lesion (HGSIL) on cytologic smear of cervix R87.612    Multinodular goiter E04.2    Hyperthyroidism E05.90    Acute appendicitis K35.80       Past Medical History:        Diagnosis Date    Hyperthyroidism     Seizures ([address]     as young but placed on no treatment    Trauma        Past Surgical History:        Procedure Laterality Date    FOOT SURGERY      wart removal    LARYNGOSCOPY         Social History:    Social History     Tobacco Use    Smoking status: Never Smoker    Smokeless tobacco: Never Used   Substance Use Topics    Alcohol use:  No                                Counseling given: Not Answered      Vital Signs (Current):   Vitals:    07/28/21 0500 07/28/21 0530   BP: 126/76    Pulse: 131    Resp: 25    Temp: 98.2 °F (36.8 °C)    TempSrc: Oral    SpO2: 97%    Weight:  263 lb 6.4 oz (119.5 kg)   Height:  5' 2\" (1.575 m)                                              BP Readings from Last 3 Encounters:   07/28/21 126/76   07/28/21 110/74   07/27/21 (!) 164/100 NPO Status: Time of last liquid consumption: 0800                        Time of last solid consumption: 0800                        Date of last liquid consumption: 07/27/21                        Date of last solid food consumption: 07/27/21    BMI:   Wt Readings from Last 3 Encounters:   07/28/21 263 lb 6.4 oz (119.5 kg)   07/27/21 225 lb (102.1 kg)   07/27/21 225 lb (102.1 kg)     Body mass index is 48.18 kg/m². CBC:   Lab Results   Component Value Date    WBC 13.8 07/28/2021    RBC 4.70 07/28/2021    HGB 13.3 07/28/2021    HCT 39.1 07/28/2021    MCV 83.2 07/28/2021    RDW 12.6 07/28/2021     07/28/2021       CMP:   Lab Results   Component Value Date     07/28/2021    K 4.2 07/28/2021     07/28/2021    CO2 24 07/28/2021    BUN 14 07/28/2021    CREATININE 0.5 07/28/2021    GFRAA >60 07/28/2021    LABGLOM >60 07/28/2021    GLUCOSE 132 07/28/2021    PROT 7.8 07/28/2021    CALCIUM 9.7 07/28/2021    BILITOT 1.1 07/28/2021    ALKPHOS 172 07/28/2021    AST 20 07/28/2021    ALT 26 07/28/2021       POC Tests: No results for input(s): POCGLU, POCNA, POCK, POCCL, POCBUN, POCHEMO, POCHCT in the last 72 hours.     Coags: No results found for: PROTIME, INR, APTT    HCG (If Applicable):   Lab Results   Component Value Date    PREGTESTUR NEGATIVE 07/27/2021        ABGs: No results found for: PHART, PO2ART, WEM0UIS, HAL6FAO, BEART, Q2MGUPCD     Type & Screen (If Applicable):  No results found for: LABABO, LABRH    Drug/Infectious Status (If Applicable):  No results found for: HIV, HEPCAB    COVID-19 Screening (If Applicable):   Lab Results   Component Value Date    COVID19 Not-Detected 12/16/2020    COVID19 Not Detected 12/16/2020           Anesthesia Evaluation  Patient summary reviewed and Nursing notes reviewed no history of anesthetic complications:   Airway: Mallampati: III  TM distance: >3 FB   Neck ROM: full  Mouth opening: > = 3 FB Dental: normal exam         Pulmonary:Negative Pulmonary ROS breath sounds clear to auscultation      (-) not a current smoker                           Cardiovascular:Negative CV ROS  Exercise tolerance: good (>4 METS),           Rhythm: regular  Rate: normal           Beta Blocker:  Not on Beta Blocker         Neuro/Psych:   (+) seizures: well controlled,             GI/Hepatic/Renal:   (+) morbid obesity         ROS comment: appendicitis. Endo/Other:    (+) hyperthyroidism::., .                  ROS comment: Stopped meds 2 years ago Abdominal:             Vascular: negative vascular ROS. Other Findings:           Anesthesia Plan      general     ASA 3 - emergent       Induction: intravenous. Anesthetic plan and risks discussed with patient. Plan discussed with CRNA.                 Skylar Marmolejo MD   7/28/2021

## 2021-07-29 LAB
ANION GAP SERPL CALCULATED.3IONS-SCNC: 8 MMOL/L (ref 7–16)
BUN BLDV-MCNC: 13 MG/DL (ref 6–20)
CALCIUM SERPL-MCNC: 9.4 MG/DL (ref 8.6–10.2)
CHLORIDE BLD-SCNC: 101 MMOL/L (ref 98–107)
CO2: 26 MMOL/L (ref 22–29)
CREAT SERPL-MCNC: 0.4 MG/DL (ref 0.5–1)
GFR AFRICAN AMERICAN: >60
GFR NON-AFRICAN AMERICAN: >60 ML/MIN/1.73
GLUCOSE BLD-MCNC: 124 MG/DL (ref 74–99)
GRAM STAIN ORDERABLE: NORMAL
HCT VFR BLD CALC: 36 % (ref 34–48)
HEMOGLOBIN: 11.6 G/DL (ref 11.5–15.5)
MCH RBC QN AUTO: 27.4 PG (ref 26–35)
MCHC RBC AUTO-ENTMCNC: 32.2 % (ref 32–34.5)
MCV RBC AUTO: 85.1 FL (ref 80–99.9)
MRSA CULTURE ONLY: NORMAL
PDW BLD-RTO: 13.2 FL (ref 11.5–15)
PLATELET # BLD: 265 E9/L (ref 130–450)
PMV BLD AUTO: 10.8 FL (ref 7–12)
POTASSIUM SERPL-SCNC: 3.7 MMOL/L (ref 3.5–5)
RBC # BLD: 4.23 E12/L (ref 3.5–5.5)
SODIUM BLD-SCNC: 135 MMOL/L (ref 132–146)
TSH SERPL DL<=0.05 MIU/L-ACNC: <0.01 UIU/ML (ref 0.27–4.2)
WBC # BLD: 15.4 E9/L (ref 4.5–11.5)

## 2021-07-29 PROCEDURE — 36415 COLL VENOUS BLD VENIPUNCTURE: CPT

## 2021-07-29 PROCEDURE — 96366 THER/PROPH/DIAG IV INF ADDON: CPT

## 2021-07-29 PROCEDURE — 87040 BLOOD CULTURE FOR BACTERIA: CPT

## 2021-07-29 PROCEDURE — G0378 HOSPITAL OBSERVATION PER HR: HCPCS

## 2021-07-29 PROCEDURE — 85027 COMPLETE CBC AUTOMATED: CPT

## 2021-07-29 PROCEDURE — 96376 TX/PRO/DX INJ SAME DRUG ADON: CPT

## 2021-07-29 PROCEDURE — 84443 ASSAY THYROID STIM HORMONE: CPT

## 2021-07-29 PROCEDURE — 93005 ELECTROCARDIOGRAM TRACING: CPT | Performed by: STUDENT IN AN ORGANIZED HEALTH CARE EDUCATION/TRAINING PROGRAM

## 2021-07-29 PROCEDURE — 96375 TX/PRO/DX INJ NEW DRUG ADDON: CPT

## 2021-07-29 PROCEDURE — 80048 BASIC METABOLIC PNL TOTAL CA: CPT

## 2021-07-29 PROCEDURE — 2580000003 HC RX 258: Performed by: SPECIALIST

## 2021-07-29 PROCEDURE — 6360000002 HC RX W HCPCS: Performed by: SPECIALIST

## 2021-07-29 PROCEDURE — 96365 THER/PROPH/DIAG IV INF INIT: CPT

## 2021-07-29 PROCEDURE — 6370000000 HC RX 637 (ALT 250 FOR IP): Performed by: STUDENT IN AN ORGANIZED HEALTH CARE EDUCATION/TRAINING PROGRAM

## 2021-07-29 PROCEDURE — 6360000002 HC RX W HCPCS: Performed by: STUDENT IN AN ORGANIZED HEALTH CARE EDUCATION/TRAINING PROGRAM

## 2021-07-29 PROCEDURE — 2580000003 HC RX 258: Performed by: STUDENT IN AN ORGANIZED HEALTH CARE EDUCATION/TRAINING PROGRAM

## 2021-07-29 RX ORDER — ACETAMINOPHEN 325 MG/1
650 TABLET ORAL EVERY 6 HOURS
Status: DISCONTINUED | OUTPATIENT
Start: 2021-07-29 | End: 2021-08-05 | Stop reason: HOSPADM

## 2021-07-29 RX ADMIN — BENZOCAINE AND MENTHOL 1 LOZENGE: 15; 3.6 LOZENGE ORAL at 06:12

## 2021-07-29 RX ADMIN — ACETAMINOPHEN 650 MG: 325 TABLET ORAL at 08:56

## 2021-07-29 RX ADMIN — ONDANSETRON 4 MG: 2 INJECTION INTRAMUSCULAR; INTRAVENOUS at 09:06

## 2021-07-29 RX ADMIN — HYDROMORPHONE HYDROCHLORIDE 1 MG: 1 INJECTION, SOLUTION INTRAMUSCULAR; INTRAVENOUS; SUBCUTANEOUS at 15:35

## 2021-07-29 RX ADMIN — SODIUM CHLORIDE 3000 MG: 900 INJECTION INTRAVENOUS at 04:36

## 2021-07-29 RX ADMIN — HYDROMORPHONE HYDROCHLORIDE 1 MG: 1 INJECTION, SOLUTION INTRAMUSCULAR; INTRAVENOUS; SUBCUTANEOUS at 06:12

## 2021-07-29 RX ADMIN — SODIUM CHLORIDE 3000 MG: 900 INJECTION INTRAVENOUS at 15:24

## 2021-07-29 RX ADMIN — HYDROMORPHONE HYDROCHLORIDE 1 MG: 1 INJECTION, SOLUTION INTRAMUSCULAR; INTRAVENOUS; SUBCUTANEOUS at 11:00

## 2021-07-29 RX ADMIN — SODIUM CHLORIDE 3000 MG: 900 INJECTION INTRAVENOUS at 21:02

## 2021-07-29 RX ADMIN — SODIUM CHLORIDE, POTASSIUM CHLORIDE, SODIUM LACTATE AND CALCIUM CHLORIDE: 600; 310; 30; 20 INJECTION, SOLUTION INTRAVENOUS at 09:55

## 2021-07-29 RX ADMIN — SODIUM CHLORIDE, POTASSIUM CHLORIDE, SODIUM LACTATE AND CALCIUM CHLORIDE: 600; 310; 30; 20 INJECTION, SOLUTION INTRAVENOUS at 19:28

## 2021-07-29 RX ADMIN — SODIUM CHLORIDE 3000 MG: 900 INJECTION INTRAVENOUS at 09:50

## 2021-07-29 RX ADMIN — HYDROMORPHONE HYDROCHLORIDE 1 MG: 1 INJECTION, SOLUTION INTRAMUSCULAR; INTRAVENOUS; SUBCUTANEOUS at 02:17

## 2021-07-29 RX ADMIN — ACETAMINOPHEN 650 MG: 325 TABLET ORAL at 21:01

## 2021-07-29 RX ADMIN — ACETAMINOPHEN 650 MG: 325 TABLET ORAL at 15:23

## 2021-07-29 RX ADMIN — BENZOCAINE AND MENTHOL 1 LOZENGE: 15; 3.6 LOZENGE ORAL at 09:53

## 2021-07-29 RX ADMIN — HYDROMORPHONE HYDROCHLORIDE 1 MG: 1 INJECTION, SOLUTION INTRAMUSCULAR; INTRAVENOUS; SUBCUTANEOUS at 21:02

## 2021-07-29 ASSESSMENT — PAIN SCALES - GENERAL
PAINLEVEL_OUTOF10: 6
PAINLEVEL_OUTOF10: 10
PAINLEVEL_OUTOF10: 8
PAINLEVEL_OUTOF10: 0
PAINLEVEL_OUTOF10: 8
PAINLEVEL_OUTOF10: 6
PAINLEVEL_OUTOF10: 4
PAINLEVEL_OUTOF10: 5
PAINLEVEL_OUTOF10: 9
PAINLEVEL_OUTOF10: 10
PAINLEVEL_OUTOF10: 7
PAINLEVEL_OUTOF10: 10
PAINLEVEL_OUTOF10: 0

## 2021-07-29 ASSESSMENT — PAIN DESCRIPTION - ORIENTATION
ORIENTATION: LOWER;RIGHT

## 2021-07-29 ASSESSMENT — PAIN DESCRIPTION - LOCATION
LOCATION: ABDOMEN

## 2021-07-29 ASSESSMENT — PAIN DESCRIPTION - ONSET
ONSET: ON-GOING

## 2021-07-29 ASSESSMENT — PAIN - FUNCTIONAL ASSESSMENT
PAIN_FUNCTIONAL_ASSESSMENT: ACTIVITIES ARE NOT PREVENTED

## 2021-07-29 ASSESSMENT — PAIN DESCRIPTION - PROGRESSION
CLINICAL_PROGRESSION: NOT CHANGED

## 2021-07-29 ASSESSMENT — PAIN DESCRIPTION - DESCRIPTORS
DESCRIPTORS: DISCOMFORT;SHARP;SHOOTING
DESCRIPTORS: DISCOMFORT;SHARP
DESCRIPTORS: DISCOMFORT

## 2021-07-29 ASSESSMENT — PAIN DESCRIPTION - PAIN TYPE
TYPE: SURGICAL PAIN
TYPE: SURGICAL PAIN;ACUTE PAIN
TYPE: SURGICAL PAIN
TYPE: SURGICAL PAIN

## 2021-07-29 ASSESSMENT — PAIN DESCRIPTION - FREQUENCY
FREQUENCY: CONTINUOUS

## 2021-07-29 NOTE — PROGRESS NOTES
[address] [place] [address] [facility]  JAMIE  Progress Note  Face to face encounter   SUBJECTIVE:  Patient is tolerating medications. No reported adverse drug reactions. ROS :No nausea, vomiting, diarrhea. ++ abdominal pain. Up in chair- emotional this am- + pain. ++ fevers 101.1  Not feeling well today     Medications:  Scheduled Meds:   sodium chloride flush  5-40 mL Intravenous 2 times per day    [Held by provider] enoxaparin  40 mg Subcutaneous Daily    ampicillin-sulbactam  3,000 mg Intravenous Q6H     Continuous Infusions:   sodium chloride      lactated ringers 125 mL/hr at 07/29/21 0955     PRN Meds:benzocaine-menthol, sodium chloride flush, sodium chloride, ondansetron **OR** ondansetron, acetaminophen, HYDROmorphone **OR** HYDROmorphone  OBJECTIVE:  Patient Vitals for the past 24 hrs:   BP Temp Temp src Pulse Resp SpO2   07/29/21 0945 -- 100.1 °F (37.8 °C) Oral -- -- --   07/29/21 0731 106/71 101.1 °F (38.4 °C) Oral 150 24 91 %   07/29/21 0612 (!) 133/90 -- -- -- -- 92 %   07/29/21 0430 -- -- -- -- -- 92 %   07/29/21 0426 120/65 99.3 °F (37.4 °C) Oral 143 18 90 %   07/28/21 2359 (!) 117/57 98.6 °F (37 °C) Oral 133 18 90 %   07/28/21 2300 121/68 99.8 °F (37.7 °C) Oral 126 18 92 %   07/28/21 1853 125/64 98.3 °F (36.8 °C) Oral 134 18 90 %   07/28/21 1741 -- 98.5 °F (36.9 °C) Oral -- -- --   07/28/21 1559 119/60 99 °F (37.2 °C) Oral 135 20 94 %   07/28/21 1215 139/82 98 °F (36.7 °C) Oral 144 20 --   07/28/21 1056 (!) 119/58 98.4 °F (36.9 °C) Oral 132 18 92 %     Constitutional: The patient is awake, alert, and oriented. Up in chair. In pain this am  Skin: Warm and dry. No rashes were noted. Head: Eyes show round, and reactive pupils. No jaundice. Mouth: Moist mucous membranes, no ulcerations, no thrush. Neck: Supple to movements. No lymphadenopathy. Chest: No use of accessory muscles to breathe. Symmetrical expansion. Auscultation reveals no wheezing, crackles, or rhonchi.  Diminished to bases. Cardiovascular: S1 and S2 are rhythmic and regular. Abdomen: Positive bowel sounds to auscultation. Large. ++ pain. Lap sites ok. MARY ANN drain noted with serosang  Extremities: No clubbing, no cyanosis, min edema. PIV    Laboratory and Tests Review:  Lab Results   Component Value Date    WBC 15.4 (H) 07/29/2021    WBC 13.8 (H) 07/28/2021    WBC 15.0 (H) 07/27/2021    HGB 11.6 07/29/2021    HCT 36.0 07/29/2021    MCV 85.1 07/29/2021     07/29/2021     Lab Results   Component Value Date    NEUTROABS 11.60 (H) 07/28/2021    NEUTROABS 12.29 (H) 07/27/2021    NEUTROABS 4.31 04/19/2021     Lab Results   Component Value Date    CRP 12.1 (H) 07/28/2021     Lab Results   Component Value Date    SEDRATE 25 (H) 07/28/2021     Lab Results   Component Value Date    ALT 26 07/28/2021    AST 20 07/28/2021    ALKPHOS 172 (H) 07/28/2021    BILITOT 1.1 07/28/2021     Lab Results   Component Value Date     07/29/2021    K 3.7 07/29/2021    K 4.2 07/28/2021     07/29/2021    CO2 26 07/29/2021    BUN 13 07/29/2021    CREATININE 0.4 07/29/2021    GFRAA >60 07/29/2021    LABGLOM >60 07/29/2021    GLUCOSE 124 07/29/2021    PROT 7.8 07/28/2021    LABALBU 3.8 07/28/2021    CALCIUM 9.4 07/29/2021    BILITOT 1.1 07/28/2021    ALKPHOS 172 07/28/2021    AST 20 07/28/2021    ALT 26 07/28/2021     Radiology:      Microbiology:   7/28/2021- gram stain- body fluid- GNR  Culture- pending  MRSA screen-pending     ASSESSMENT:  · Acute appendicitis   · S/p attempted lap appendectomy  · Leukocytosis associated to appendicitis   · Fevers     PLAN:  · Continue Unasyn IV  · Check cultures  · Check blood cultures   · Surgery following - had CT scan yesterday   · Monitor labs sed rate- [address], APRN - CNS  10:21 AM  7/29/2021     Patient seen and examined. I had a face to face encounter with the patient. Agree with exam.  Assessment and plan as outlined above and directed by me.  Addition and corrections were

## 2021-07-29 NOTE — PLAN OF CARE
Problem: Pain:  Goal: Pain level will decrease  Description: Pain level will decrease  Outcome: Met This Shift     Problem: Activity:  Goal: Ability to tolerate increased activity will improve  Description: Ability to tolerate increased activity will improve  Outcome: Met This Shift     Problem: Sensory:  Goal: Pain level will decrease  Description: Pain level will decrease  Outcome: Met This Shift     Problem: Pain:  Goal: Control of acute pain  Description: Control of acute pain  7/28/2021 2232 by Yomi Maguire  Outcome: Ongoing  7/28/2021 1756 by Yung Mcfarland RN  Outcome: Met This Shift     Problem: Physical Regulation:  Goal: Ability to maintain a body temperature in the normal range will improve  Description: Ability to maintain a body temperature in the normal range will improve  Outcome: Ongoing  Goal: Postoperative complications will be avoided or minimized  Description: Postoperative complications will be avoided or minimized  Outcome: Ongoing     Problem:  Bowel/Gastric:  Goal: Gastrointestinal status for postoperative course will improve  Description: Gastrointestinal status for postoperative course will improve  Outcome: Ongoing     Problem: Physical Regulation:  Goal: Ability to maintain vital signs within normal range will improve  Description: Ability to maintain vital signs within normal range will improve  Outcome: Not Met This Shift

## 2021-07-29 NOTE — PROGRESS NOTES
Dr. Power Porter notified of patient's current HR of 150 as well as temp and increased lethargy, awaiting return call will continue to monitor.

## 2021-07-29 NOTE — CARE COORDINATION
Social Work:    Advised by Dr. Gianni Ashton that patient may be discharging tomorrow with IV Unasyn Q-6. Social work updated Rhoda at [facility] and patient is 100% covered.     Electronically signed by MIRTHA Hamm on 7/29/2021 at 4:05 PM

## 2021-07-29 NOTE — HOME CARE
Referral received for possible home IV infusion. Spoke with patient, demographics verified. IV coverage verified. [facility] in network with 100% coverage. IExplained IV benefit. Patient is agreeable to home care. Will await final orders. Radhika Barrow LPN [id number] [facility] 9.

## 2021-07-29 NOTE — CARE COORDINATION
Updated plan of care. Pt is receptive to home care and is ok with [facility].  Referral called to Rhoda at [facility] care for possible iv antibiotics and home care-мария

## 2021-07-29 NOTE — PLAN OF CARE
Problem: Pain:  Goal: Control of acute pain  Description: Control of acute pain  Outcome: Met This Shift     Problem:  Activity:  Goal: Ability to tolerate increased activity will improve  Description: Ability to tolerate increased activity will improve  Outcome: Met This Shift

## 2021-07-29 NOTE — PROGRESS NOTES
GENERAL SURGERY  DAILY PROGRESS NOTE  7/29/2021    SUBJECTIVE:  Complains of pain abdomen and throat, controlled w Rx. Hungry and wants to eat. No bowel function yet. No nausea or vomiting. OBJECTIVE:  /68   Pulse 131   Temp 98.6 °F (37 °C) (Oral)   Resp 20   Ht 5' 2\" (1.575 m)   Wt 263 lb 6.4 oz (119.5 kg)   LMP 07/22/2021   SpO2 93%   BMI 48.18 kg/m²     GENERAL:  NAD. A&Ox3. LUNGS:  No increased work of breathing. CARDIOVASCULAR: RR  ABDOMEN:  Soft, non-distended, moderate diffuse tenderness. MARY ANN drain with cloudy but serosang output. No guarding, rigidity, rebound. ASSESSMENT/PLAN:  22 y.o. female with acute perforated appendicitis s/p aborted laparoscopic appendectomy secondary to significant inflammation 7/28     Afebrile. WBC from 13 to 15k  Tachycardic but normotensive throughout night -- suspect secondary to pain, EKG to confirm  Defer Abx to Infectious Disease  Continue NPO, IVF, and Pain control PRN  Await bowel function  Cepacol for throat pain    Maria L Chou DO  Surgery Resident PGY-4  7/29/2021  1:03 PM    I saw and examined the patient. I reviewed the above resident's note. I agree with the assessment and plan as outlined.     Kassie Acosta MD  General Surgery

## 2021-07-30 LAB
ANAEROBIC CULTURE: NORMAL
ANION GAP SERPL CALCULATED.3IONS-SCNC: 13 MMOL/L (ref 7–16)
BASOPHILS ABSOLUTE: 0.02 E9/L (ref 0–0.2)
BASOPHILS RELATIVE PERCENT: 0.1 % (ref 0–2)
BUN BLDV-MCNC: 15 MG/DL (ref 6–20)
CALCIUM SERPL-MCNC: 8.4 MG/DL (ref 8.6–10.2)
CHLORIDE BLD-SCNC: 102 MMOL/L (ref 98–107)
CO2: 25 MMOL/L (ref 22–29)
CREAT SERPL-MCNC: 0.5 MG/DL (ref 0.5–1)
EKG ATRIAL RATE: 133 BPM
EKG P AXIS: 37 DEGREES
EKG P-R INTERVAL: 114 MS
EKG Q-T INTERVAL: 296 MS
EKG QRS DURATION: 90 MS
EKG QTC CALCULATION (BAZETT): 440 MS
EKG R AXIS: 43 DEGREES
EKG T AXIS: -22 DEGREES
EKG VENTRICULAR RATE: 133 BPM
EOSINOPHILS ABSOLUTE: 0.01 E9/L (ref 0.05–0.5)
EOSINOPHILS RELATIVE PERCENT: 0.1 % (ref 0–6)
GFR AFRICAN AMERICAN: >60
GFR NON-AFRICAN AMERICAN: >60 ML/MIN/1.73
GLUCOSE BLD-MCNC: 79 MG/DL (ref 74–99)
HCT VFR BLD CALC: 33.3 % (ref 34–48)
HEMOGLOBIN: 10.5 G/DL (ref 11.5–15.5)
IMMATURE GRANULOCYTES #: 0.08 E9/L
IMMATURE GRANULOCYTES %: 0.6 % (ref 0–5)
LYMPHOCYTES ABSOLUTE: 1.66 E9/L (ref 1.5–4)
LYMPHOCYTES RELATIVE PERCENT: 12.3 % (ref 20–42)
MAGNESIUM: 1.9 MG/DL (ref 1.6–2.6)
MCH RBC QN AUTO: 27.7 PG (ref 26–35)
MCHC RBC AUTO-ENTMCNC: 31.5 % (ref 32–34.5)
MCV RBC AUTO: 87.9 FL (ref 80–99.9)
MONOCYTES ABSOLUTE: 1.03 E9/L (ref 0.1–0.95)
MONOCYTES RELATIVE PERCENT: 7.7 % (ref 2–12)
NEUTROPHILS ABSOLUTE: 10.65 E9/L (ref 1.8–7.3)
NEUTROPHILS RELATIVE PERCENT: 79.2 % (ref 43–80)
PDW BLD-RTO: 13.1 FL (ref 11.5–15)
PLATELET # BLD: 196 E9/L (ref 130–450)
PMV BLD AUTO: 11.1 FL (ref 7–12)
POTASSIUM SERPL-SCNC: 3.9 MMOL/L (ref 3.5–5)
RBC # BLD: 3.79 E12/L (ref 3.5–5.5)
SODIUM BLD-SCNC: 140 MMOL/L (ref 132–146)
T4 FREE: 2.5 NG/DL (ref 0.93–1.7)
T4 TOTAL: 10.7 MCG/DL (ref 4.5–11.7)
TSH SERPL DL<=0.05 MIU/L-ACNC: <0.01 UIU/ML (ref 0.27–4.2)
WBC # BLD: 13.5 E9/L (ref 4.5–11.5)

## 2021-07-30 PROCEDURE — 2580000003 HC RX 258: Performed by: SPECIALIST

## 2021-07-30 PROCEDURE — 6360000002 HC RX W HCPCS: Performed by: CLINICAL NURSE SPECIALIST

## 2021-07-30 PROCEDURE — 1200000000 HC SEMI PRIVATE

## 2021-07-30 PROCEDURE — 2580000003 HC RX 258: Performed by: CLINICAL NURSE SPECIALIST

## 2021-07-30 PROCEDURE — 80048 BASIC METABOLIC PNL TOTAL CA: CPT

## 2021-07-30 PROCEDURE — 84439 ASSAY OF FREE THYROXINE: CPT

## 2021-07-30 PROCEDURE — 84443 ASSAY THYROID STIM HORMONE: CPT

## 2021-07-30 PROCEDURE — 6360000002 HC RX W HCPCS: Performed by: STUDENT IN AN ORGANIZED HEALTH CARE EDUCATION/TRAINING PROGRAM

## 2021-07-30 PROCEDURE — 2580000003 HC RX 258: Performed by: STUDENT IN AN ORGANIZED HEALTH CARE EDUCATION/TRAINING PROGRAM

## 2021-07-30 PROCEDURE — 96366 THER/PROPH/DIAG IV INF ADDON: CPT

## 2021-07-30 PROCEDURE — G0378 HOSPITAL OBSERVATION PER HR: HCPCS

## 2021-07-30 PROCEDURE — 99254 IP/OBS CNSLTJ NEW/EST MOD 60: CPT | Performed by: INTERNAL MEDICINE

## 2021-07-30 PROCEDURE — 84445 ASSAY OF TSI GLOBULIN: CPT

## 2021-07-30 PROCEDURE — 6370000000 HC RX 637 (ALT 250 FOR IP): Performed by: STUDENT IN AN ORGANIZED HEALTH CARE EDUCATION/TRAINING PROGRAM

## 2021-07-30 PROCEDURE — 96367 TX/PROPH/DG ADDL SEQ IV INF: CPT

## 2021-07-30 PROCEDURE — 6370000000 HC RX 637 (ALT 250 FOR IP): Performed by: INTERNAL MEDICINE

## 2021-07-30 PROCEDURE — 96376 TX/PRO/DX INJ SAME DRUG ADON: CPT

## 2021-07-30 PROCEDURE — 86376 MICROSOMAL ANTIBODY EACH: CPT

## 2021-07-30 PROCEDURE — 99024 POSTOP FOLLOW-UP VISIT: CPT | Performed by: SURGERY

## 2021-07-30 PROCEDURE — 6360000002 HC RX W HCPCS: Performed by: SPECIALIST

## 2021-07-30 PROCEDURE — 36415 COLL VENOUS BLD VENIPUNCTURE: CPT

## 2021-07-30 PROCEDURE — 87449 NOS EACH ORGANISM AG IA: CPT

## 2021-07-30 PROCEDURE — 83735 ASSAY OF MAGNESIUM: CPT

## 2021-07-30 PROCEDURE — 86800 THYROGLOBULIN ANTIBODY: CPT

## 2021-07-30 PROCEDURE — 85025 COMPLETE CBC W/AUTO DIFF WBC: CPT

## 2021-07-30 PROCEDURE — 99221 1ST HOSP IP/OBS SF/LOW 40: CPT | Performed by: INTERNAL MEDICINE

## 2021-07-30 RX ORDER — OXYCODONE HYDROCHLORIDE 5 MG/1
5 TABLET ORAL EVERY 4 HOURS PRN
Status: DISCONTINUED | OUTPATIENT
Start: 2021-07-30 | End: 2021-08-05 | Stop reason: HOSPADM

## 2021-07-30 RX ORDER — PROPRANOLOL HYDROCHLORIDE 20 MG/1
20 TABLET ORAL 3 TIMES DAILY
Status: DISCONTINUED | OUTPATIENT
Start: 2021-07-30 | End: 2021-08-03

## 2021-07-30 RX ORDER — METHIMAZOLE 10 MG/1
20 TABLET ORAL 3 TIMES DAILY
Status: DISCONTINUED | OUTPATIENT
Start: 2021-07-30 | End: 2021-08-03

## 2021-07-30 RX ORDER — LABETALOL HYDROCHLORIDE 5 MG/ML
10 INJECTION, SOLUTION INTRAVENOUS
Status: DISCONTINUED | OUTPATIENT
Start: 2021-07-30 | End: 2021-07-30

## 2021-07-30 RX ADMIN — SODIUM CHLORIDE 3000 MG: 900 INJECTION INTRAVENOUS at 03:33

## 2021-07-30 RX ADMIN — OXYCODONE 5 MG: 5 TABLET ORAL at 15:40

## 2021-07-30 RX ADMIN — METHIMAZOLE 20 MG: 10 TABLET ORAL at 23:18

## 2021-07-30 RX ADMIN — OXYCODONE 5 MG: 5 TABLET ORAL at 19:53

## 2021-07-30 RX ADMIN — ACETAMINOPHEN 650 MG: 325 TABLET ORAL at 03:33

## 2021-07-30 RX ADMIN — HYDROMORPHONE HYDROCHLORIDE 1 MG: 1 INJECTION, SOLUTION INTRAMUSCULAR; INTRAVENOUS; SUBCUTANEOUS at 00:14

## 2021-07-30 RX ADMIN — ERTAPENEM SODIUM 1000 MG: 1 INJECTION, POWDER, LYOPHILIZED, FOR SOLUTION INTRAMUSCULAR; INTRAVENOUS at 12:00

## 2021-07-30 RX ADMIN — ACETAMINOPHEN 650 MG: 325 TABLET ORAL at 08:27

## 2021-07-30 RX ADMIN — HYDROMORPHONE HYDROCHLORIDE 1 MG: 1 INJECTION, SOLUTION INTRAMUSCULAR; INTRAVENOUS; SUBCUTANEOUS at 06:24

## 2021-07-30 RX ADMIN — SODIUM CHLORIDE 3000 MG: 900 INJECTION INTRAVENOUS at 09:16

## 2021-07-30 RX ADMIN — ACETAMINOPHEN 650 MG: 325 TABLET ORAL at 21:10

## 2021-07-30 RX ADMIN — ACETAMINOPHEN 650 MG: 325 TABLET ORAL at 15:40

## 2021-07-30 RX ADMIN — PROPRANOLOL HYDROCHLORIDE 20 MG: 20 TABLET ORAL at 16:10

## 2021-07-30 RX ADMIN — OXYCODONE 5 MG: 5 TABLET ORAL at 23:55

## 2021-07-30 RX ADMIN — METHIMAZOLE 20 MG: 10 TABLET ORAL at 16:10

## 2021-07-30 RX ADMIN — SODIUM CHLORIDE, PRESERVATIVE FREE 10 ML: 5 INJECTION INTRAVENOUS at 00:15

## 2021-07-30 RX ADMIN — PROPRANOLOL HYDROCHLORIDE 20 MG: 20 TABLET ORAL at 23:15

## 2021-07-30 ASSESSMENT — PAIN DESCRIPTION - FREQUENCY
FREQUENCY: CONTINUOUS

## 2021-07-30 ASSESSMENT — PAIN DESCRIPTION - ONSET
ONSET: ON-GOING

## 2021-07-30 ASSESSMENT — PAIN SCALES - GENERAL
PAINLEVEL_OUTOF10: 6
PAINLEVEL_OUTOF10: 6
PAINLEVEL_OUTOF10: 7
PAINLEVEL_OUTOF10: 4
PAINLEVEL_OUTOF10: 6
PAINLEVEL_OUTOF10: 6
PAINLEVEL_OUTOF10: 5
PAINLEVEL_OUTOF10: 5
PAINLEVEL_OUTOF10: 4
PAINLEVEL_OUTOF10: 8

## 2021-07-30 ASSESSMENT — PAIN DESCRIPTION - PROGRESSION
CLINICAL_PROGRESSION: NOT CHANGED

## 2021-07-30 ASSESSMENT — PAIN DESCRIPTION - LOCATION
LOCATION: ABDOMEN

## 2021-07-30 ASSESSMENT — PAIN - FUNCTIONAL ASSESSMENT
PAIN_FUNCTIONAL_ASSESSMENT: ACTIVITIES ARE NOT PREVENTED
PAIN_FUNCTIONAL_ASSESSMENT: PREVENTS OR INTERFERES SOME ACTIVE ACTIVITIES AND ADLS

## 2021-07-30 ASSESSMENT — PAIN DESCRIPTION - ORIENTATION
ORIENTATION: LEFT;LOWER

## 2021-07-30 ASSESSMENT — PAIN DESCRIPTION - PAIN TYPE
TYPE: SURGICAL PAIN

## 2021-07-30 ASSESSMENT — PAIN DESCRIPTION - DESCRIPTORS
DESCRIPTORS: CONSTANT;DISCOMFORT;SHARP;STABBING

## 2021-07-30 NOTE — PROGRESS NOTES
GENERAL SURGERY  DAILY PROGRESS NOTE  7/30/2021    SUBJECTIVE:  Complains of pain abdomen and throat, controlled w Rx. Had two BM today    OBJECTIVE:  /72   Pulse 156   Temp 99.5 °F (37.5 °C) (Oral)   Resp 20   Ht 5' 2\" (1.575 m)   Wt 263 lb 6.4 oz (119.5 kg)   LMP 07/22/2021   SpO2 95%   BMI 48.18 kg/m²     GENERAL:  NAD. A&Ox3. LUNGS:  No increased work of breathing. CARDIOVASCULAR: RR  ABDOMEN:  Soft, non-distended, moderate diffuse tenderness. MARY ANN drain with cloudy but serosang output. No guarding, rigidity, rebound.     ASSESSMENT/PLAN:  22 y.o. female with acute perforated appendicitis s/p aborted laparoscopic appendectomy secondary to significant inflammation 7/28     Persistent tachycardia, untreated hyperthyroidism - consult medicine  Defer Abx to Infectious Disease  clears  Cepacol for throat pain    Cookie Mendes MD  7/30/2021

## 2021-07-30 NOTE — CARE COORDINATION
Updated plan of care. Awaiting final IV antibiotics for home infusion. Instructed staff to fax script to [facility] care at [phone] and call [facility] care if pt discharges over weekend.  Home care orders completed-O

## 2021-07-30 NOTE — CARE COORDINATION
Social Work:    Received a call from Monica Rollins at [address] that patient is now on Invanz once a day. Marino inquired as to whether or not the patient will want O.P. infusion instead of HHC. Social service spoke with Negra Michelle and she still prefers [address]. Social work updated Monica Rollins and advised her that we are awaiting Dr. Adi Nicholson to confirm the final Chris Ernst will need orders and a script faxed to [phone].     Electronically signed by MIRTHA Ricci on 7/30/2021 at 12:09 PM

## 2021-07-30 NOTE — CONSULTS
ENDOCRINOLOGY INITIAL CONSULTATION NOTE    Date of Admission: 7/28/2021  Date of Service: 7/30/2021  Admitting Physician: Freddie Wilcox MD   Primary Care Physician: Wayna Gosselin, DO  Consultant physician: Mallory Coon MD     Reason for the consultation:  Hyperthyroidism     History of Present Illness: The history is provided by the patient. Accuracy of the patient data is excellent. Linwood Damian is a very pleasant 22 y.o. old female with PMH obesity, hyperthyroidism admitted to [facility] on 7/28/2021 because of nausea vomiting and abdominal pain and found to have acute appendicitis, endocrine service was consulted for management of hyperthyroidism  The patient was fond to have tachycardiac during admission and on further work up was found to have hyperthyroidism  Lab Results   Component Value Date/Time    TSH <0.010 (L) 07/29/2021 01:50 PM     The patient also has fever, abdominal pain, and ruptured which also contribute to her tachycardia      The patient denies CP, or SOB     She was diagnosed with hyperthyroidism 2 years ago but per pt this was never treated      Thyroid US 6/2019: The right lobe of thyroid measures 5.4 x 3.3 x 4.4 cm. There is a mass in the right lobe of thyroid measuring 4 x 2.7 x 2.2  cm. This is not significantly changed. The left lobe of thyroid measures 2.9 x 0.8 x 0.8 cm. There is a hypoechoic nodule measuring 4 x 3 x 4 mm.         Past Medical History   Past Medical History:   Diagnosis Date    Hyperthyroidism     Seizures ([address]     as young but placed on no treatment    Trauma        Past Surgical History   Past Surgical History:   Procedure Laterality Date    FOOT SURGERY      wart removal    LAPAROSCOPIC APPENDECTOMY N/A 7/28/2021    APPENDECTOMY LAPAROSCOPIC performed by Freddie Wilcox MD at [address] history   Tobacco:   reports that she has never smoked.  She has never used smokeless tobacco.  Alcohol:   reports no history of alcohol use. Drugs:   reports no history of drug use. Family history   Family History   Problem Relation Age of Onset    Other Mother         colitis    Diabetes Maternal Grandmother     Diabetes Maternal Grandfather     Heart Failure Maternal Grandfather     Thyroid Cancer Neg Hx     Hyperthyroidism Neg Hx        Allergies and Drug reactions  No Known Allergies    Scheduled Meds:   ertapenem (INVanz) IVPB  1,000 mg Intravenous Q24H    propranolol  20 mg Oral TID    methIMAzole  20 mg Oral TID    acetaminophen  650 mg Oral Q6H    sodium chloride flush  5-40 mL Intravenous 2 times per day    [Held by provider] enoxaparin  40 mg Subcutaneous Daily     PRN Meds:   oxyCODONE, 5 mg, Q4H PRN  benzocaine-menthol, 1 lozenge, Q2H PRN  sodium chloride flush, 5-40 mL, PRN  sodium chloride, 25 mL, PRN  ondansetron, 4 mg, Q8H PRN   Or  ondansetron, 4 mg, Q6H PRN  HYDROmorphone, 0.5 mg, Q3H PRN   Or  HYDROmorphone, 1 mg, Q3H PRN      Continuous Infusions:   sodium chloride      lactated ringers 100 mL/hr at 07/30/21 1310       Review of Systems  All systems reviewed. All negative except for symptoms mentioned in HPI     OBJECTIVE    /76   Pulse 136   Temp 99.9 °F (37.7 °C) (Oral)   Resp 20   Ht 5' 2\" (1.575 m)   Wt 263 lb 6.4 oz (119.5 kg)   LMP 07/22/2021   SpO2 94%   BMI 48.18 kg/m²   Wt Readings from Last 6 Encounters:   07/28/21 263 lb 6.4 oz (119.5 kg)   07/27/21 225 lb (102.1 kg)   07/27/21 225 lb (102.1 kg)   04/27/21 225 lb (102.1 kg)   12/16/20 212 lb (96.2 kg)   10/03/20 212 lb (96.2 kg)       Physical examination:  General: awake alert, oriented x3, no abnormal position or movements.    HEENT: normocephalic non traumatic, no exophthalmos, no lid lag, no lid retraction   Neck: supple, no LN enlargement, + thyromegaly, Large Rt side nodule palpable, no thyroid tenderness, no thyroid bruit   Pulm: Clear equal air entry no added sounds, no wheezing or rhonchi    CVS: S1 + S2, no murmur, no Review:   I personally reviewed and summarized previous records   All labs and imaging were reviewed independently    Dasia Tang., a 22 y.o.-old female seen in for management of Hyperthyroidism     Hyperthyroidism  · Likely due to toxic multinodular goiter. Other DD include thyroiditis vs graves disease   · Start Methimazole 20 mg TID  · Start Propranolol 20 mg TID   · Check TSH, free T4, T4, TSI, TPO-Ab, Tg-Ab  · Obtain thyroid US once her general condition improve   · Daily free T4     Multinodular goiter   · I have reviewed previous thyroid US that was done in 2019. Largest nodule in the Rt lobe measuring 4 cm   · Will obtain thyroid US   · Discuss thyroid surgery vs AVILEZ ablation     Tachycardia   · Likely multifactorial hyperthyroidism, pain, fever    Interdisciplinary plan for communication with healthcare providers:   Consult recommendations were discussed with the Primary Service/Nursing staff      The above issues were reviewed with the patient who understood and agreed with the plan. Thank you for allowing us to participate in the care of this patient. Please do not hesitate to contact us with any additional questions. Abdiel Wellington MD  Endocrinologist, [facility] Endocrin[facility]   [address] [place] [id number]   Phone: [phone]  Fax: [phone]  ---------------------------------  An electronic signature was used to authenticate this note.  Scooby Doan MD on 7/30/2021 at 4:15 PM

## 2021-07-30 NOTE — PLAN OF CARE
Problem: Pain:  Goal: Pain level will decrease  Description: Pain level will decrease  Outcome: Met This Shift     Problem: Physical Regulation:  Goal: Ability to maintain a body temperature in the normal range will improve  Description: Ability to maintain a body temperature in the normal range will improve  Outcome: Met This Shift     Problem:  Activity:  Goal: Ability to tolerate increased activity will improve  Description: Ability to tolerate increased activity will improve  7/30/2021 0229 by Emily Hannon RN  Outcome: Ongoing     Problem: Sensory:  Goal: Pain level will decrease  Description: Pain level will decrease  Outcome: Met This Shift

## 2021-07-30 NOTE — PROGRESS NOTES
Notified gen surg resident of abnormal TSH result. Notified of temp 100.5 this AM and high HR, currently sustaining in 150s. Will continue to monitor. Pt states she has had a \"hot nodule\" on thyroid for years, cannot recall Dr she was previously following with. States now Dr office is permanently closed.

## 2021-07-30 NOTE — CONSULTS
[address] [facility]      Reason for Consult:  Medical management    History of Present Illness:    58-year-old female with past medical history of hyperthyroidism which was diagnosed during her pregnancy which was 2 years ago, she was following with endocrinologist, she had brief 2 visits, but later on as per patient practice was closed and she did not have follow-up. We are consulted for medical management of sinus tachycardia. She denies any cardiac history, currently she is with fever, pain, ruptured appendicitis and ongoing inflammation which all might be contributing to sinus tachycardia along with untreated hyperthyroidism. She was seen in ER on 727 with nausea vomiting and abdominal pain. Work-up with CT evidence of acute appendicitis. General surgery was consulted from ER. She was admitted over here from [facility] ER. She underwent diagnostic laparoscopy, abdominal washout and drain placement. Was found to have a perforated appendicitis. During postop - She continued to have tachycardia, so medical consult was called. At present she denies any chest pain, short of breath. She she has persistent tachycardia and palpitations associated with some diaphoresis. But no syncope or presyncope noted. Has postop abdominal pain secondary to perforated appendicitis/inflammation, &  is well controlled with current medications. REVIEW OF SYSTEMS:  Complete ROS performed with patient, pertinent positives and negatives are listed in the HPI.     PMH:  Past Medical History:   Diagnosis Date    Hyperthyroidism     Seizures ([address]     as young but placed on no treatment    Trauma        Surgical History:  Past Surgical History:   Procedure Laterality Date    FOOT SURGERY      wart removal    LAPAROSCOPIC APPENDECTOMY N/A 7/28/2021    APPENDECTOMY LAPAROSCOPIC performed by Carlos Manuel Licnoln MD at [address] [facility] Medications Prior to Admission:    Prior to Admission medications    Not on File       Allergies:    Patient has no known allergies. Social History:    reports that she has never smoked. She has never used smokeless tobacco. She reports that she does not drink alcohol and does not use drugs. Family History:       Problem Relation Age of Onset    Other Mother         colitis    Diabetes Maternal Grandmother     Diabetes Maternal Grandfather     Heart Failure Maternal Grandfather     Thyroid Cancer Neg Hx     Hyperthyroidism Neg Hx          PHYSICAL EXAM:  Vitals:  /72   Pulse 156   Temp 99.5 °F (37.5 °C) (Oral)   Resp 20   Ht 5' 2\" (1.575 m)   Wt 263 lb 6.4 oz (119.5 kg)   LMP 07/22/2021   SpO2 95%   BMI 48.18 kg/m²   General Appearance: alert and oriented to person, place and time,in no acute distress, has fine tremors of hands.   Skin: warm and dry, little diaphoretic  Head: normocephalic and atraumatic  Eyes: pupils equal, round, and reactive to light, extraocular eye movements intact, conjunctivae normal  Neck: supple and non-tender, palpable right thyroid nodule  Pulmonary/Chest: clear to auscultation bilaterally  Cardiovascular: Sinus tachycardia, regular rhythm, normal S1 and S2  Abdomen: soft, appropriate moderate   postop tenderness, non-distended, normal bowel sounds, no masses or organomegaly  Extremities: no cyanosis, clubbing   Musculoskeletal: normal range of motion  Neurologic: no cranial nerve deficit,  speech normal      LABS:  Recent Labs     07/28/21  0030 07/29/21  0230 07/30/21  0740    135 140   K 4.2 3.7 3.9    101 102   CO2 24 26 25   BUN 14 13 15   CREATININE 0.5 0.4* 0.5   GLUCOSE 132* 124* 79   CALCIUM 9.7 9.4 8.4*       Recent Labs     07/28/21  0030 07/29/21  0230 07/30/21  0740   WBC 13.8* 15.4* 13.5*   RBC 4.70 4.23 3.79   HGB 13.3 11.6 10.5*   HCT 39.1 36.0 33.3*   MCV 83.2 85.1 87.9   MCH 28.3 27.4 27.7   MCHC 34.0 32.2 31.5*   RDW 12.6 13.2 13.1    265 196   MPV 10.6 10.8 11.1       No results for input(s): POCGLU in the last 72 hours. Labs and images reviewed    Radiology:   No orders to display       EKG:   Sinus tachycardia , nonspecific ST-T changes    ASSESSMENT:      Principal Problem:    Acute appendicitis  Resolved Problems:    * No resolved hospital problems. *      PLAN:  Persistent sinus tachycardia-multifactorial.  Likely secondary to untreated hyperthyroidism, pain, fever and ongoing inflammation with perforated appendicitis. Will treat with Inderal with parameters to hold. Monitor. Untreated hyperthyroidism-Dr Becerril consulted & d/w him. We will check T4, free T4, TSH, thyroid-stimulating immunoglobulin, thyroid peroxidase antibody and antithyroid antibody as per Dr. Saida Reyes. Will need outpatient follow-up. Has hot nodule in the right thyroid as per patient. Perforated appendicitis s/p aborted laparoscopic appendectomy, with abdominal washout and drain placement. Continue antibiotics as per ID recommendation and postop care as per surgery. Was on IV ceftriaxone plus Flagyl followed by IV Unasyn, now on IV ertapenem. Thank you very much for this interesting consult and we will continue to follow along. Feel free to call with any questions at [phone]. Electronically signed by Fidel Mac MD on 7/30/2021 at 2:09 PM    NOTE: This report was transcribed using voice recognition software.  Every effort was made to ensure accuracy; however, inadvertent computerized transcription errors may be present.

## 2021-07-30 NOTE — PROGRESS NOTES
[address] [place] [address] [facility]  [facility]  Progress Note  Face to face encounter   SUBJECTIVE:  Patient is tolerating medications. No reported adverse drug reactions. ROS :No nausea, vomiting, diarrhea. Still with some stabbing pain. Feeling a little better today than yesterday. ++ fevers- 100.5     Medications:  Scheduled Meds:   acetaminophen  650 mg Oral Q6H    sodium chloride flush  5-40 mL Intravenous 2 times per day    [Held by provider] enoxaparin  40 mg Subcutaneous Daily    ampicillin-sulbactam  3,000 mg Intravenous Q6H     Continuous Infusions:   sodium chloride      lactated ringers 125 mL/hr at 07/29/21 1928     PRN Meds:benzocaine-menthol, sodium chloride flush, sodium chloride, ondansetron **OR** ondansetron, HYDROmorphone **OR** HYDROmorphone  OBJECTIVE:  Patient Vitals for the past 24 hrs:   BP Temp Temp src Pulse Resp SpO2   07/30/21 0901 -- -- -- -- -- 91 %   07/30/21 0819 -- -- -- -- -- 92 %   07/30/21 0701 128/70 100.5 °F (38.1 °C) Oral 133 20 (!) 86 %   07/30/21 0430 -- 98.5 °F (36.9 °C) Oral -- -- --   07/30/21 0330 121/74 100.4 °F (38 °C) Oral 137 22 --   07/29/21 2359 123/73 98.9 °F (37.2 °C) Oral 133 23 (!) 89 %   07/29/21 2225 128/63 98.7 °F (37.1 °C) Oral 138 22 --   07/29/21 1819 131/65 98.1 °F (36.7 °C) Oral 144 20 91 %   07/29/21 1555 126/86 99.7 °F (37.6 °C) Oral 143 20 --   07/29/21 1523 134/70 100.6 °F (38.1 °C) Axillary 146 20 --   07/29/21 1315 128/75 98.7 °F (37.1 °C) Oral 134 20 --   07/29/21 1052 114/68 98.6 °F (37 °C) Oral 131 20 93 %     Constitutional: The patient is awake, alert, and oriented. Laying in bed this am.   Skin: Warm and dry. No rashes were noted. Head: Eyes show round, and reactive pupils. No jaundice. Mouth: Moist mucous membranes, no ulcerations, no thrush. Neck: Supple to movements. No lymphadenopathy. Chest: No use of accessory muscles to breathe. Symmetrical expansion. Auscultation reveals no wheezing, crackles, or rhonchi. Diminished to bases. Cardiovascular: S1 and S2 are rhythmic and regular. Abdomen: Positive bowel sounds to auscultation. Large. ++ pain. Lap sites ok. MARY ANN drain noted with serosang  Extremities: No clubbing, no cyanosis, min edema.   Left forearm with pink color/ warm to touch  PIV    Laboratory and Tests Review:  Lab Results   Component Value Date    WBC 13.5 (H) 07/30/2021    WBC 15.4 (H) 07/29/2021    WBC 13.8 (H) 07/28/2021    HGB 10.5 (L) 07/30/2021    HCT 33.3 (L) 07/30/2021    MCV 87.9 07/30/2021     07/30/2021     Lab Results   Component Value Date    NEUTROABS 10.65 (H) 07/30/2021    NEUTROABS 11.60 (H) 07/28/2021    NEUTROABS 12.29 (H) 07/27/2021     Lab Results   Component Value Date    CRP 12.1 (H) 07/28/2021     Lab Results   Component Value Date    SEDRATE 25 (H) 07/28/2021     Lab Results   Component Value Date    ALT 26 07/28/2021    AST 20 07/28/2021    ALKPHOS 172 (H) 07/28/2021    BILITOT 1.1 07/28/2021     Lab Results   Component Value Date     07/30/2021    K 3.9 07/30/2021    K 4.2 07/28/2021     07/30/2021    CO2 25 07/30/2021    BUN 15 07/30/2021    CREATININE 0.5 07/30/2021    GFRAA >60 07/30/2021    LABGLOM >60 07/30/2021    GLUCOSE 79 07/30/2021    PROT 7.8 07/28/2021    LABALBU 3.8 07/28/2021    CALCIUM 8.4 07/30/2021    BILITOT 1.1 07/28/2021    ALKPHOS 172 07/28/2021    AST 20 07/28/2021    ALT 26 07/28/2021     Radiology:      Microbiology:   7/28/2021- gram stain- body fluid-E. coli, GNR  Blood Culture- pending  MRSA screen-pending     ASSESSMENT:  · Acute appendicitis   · S/p attempted lap appendectomy  · Leukocytosis associated to appendicitis   · Fevers     PLAN:  · Will change Unasyn IV to Ertapenem   · Check cultures  · Check beta-1-3 D glucan assay   · Check blood cultures - pending from 7/29/2021   · Surgery following - had CT scan 7/28/2021  · Monitor labs sed rate- 25 WBC- 13.5 today   · Discussed possibility of picc line with patient- she is agreeable Funmi Livingston, NANDA - CNS  9:52 AM  7/30/2021     Patient seen and examined. I had a face to face encounter with the patient. Agree with exam.  Assessment and plan as outlined above and directed by me. Addition and corrections were done as deemed appropriate. My exam and plan include: The patient still having abdominal pain. She is tolerating current antibiotics. Unasyn has been changed over to Ertapenem. Check final culture. She may need IV antibiotics. White count is still high.   She may need repeat Stacy Raymundo MD  7/30/2021  4:07 PM

## 2021-07-31 LAB
ANION GAP SERPL CALCULATED.3IONS-SCNC: 10 MMOL/L (ref 7–16)
BASOPHILS ABSOLUTE: 0.01 E9/L (ref 0–0.2)
BASOPHILS RELATIVE PERCENT: 0.1 % (ref 0–2)
BODY FLUID CULTURE, STERILE: ABNORMAL
BODY FLUID CULTURE, STERILE: ABNORMAL
BUN BLDV-MCNC: 11 MG/DL (ref 6–20)
CALCIUM SERPL-MCNC: 8.3 MG/DL (ref 8.6–10.2)
CHLORIDE BLD-SCNC: 99 MMOL/L (ref 98–107)
CO2: 25 MMOL/L (ref 22–29)
CREAT SERPL-MCNC: 0.5 MG/DL (ref 0.5–1)
EOSINOPHILS ABSOLUTE: 0.05 E9/L (ref 0.05–0.5)
EOSINOPHILS RELATIVE PERCENT: 0.4 % (ref 0–6)
GFR AFRICAN AMERICAN: >60
GFR NON-AFRICAN AMERICAN: >60 ML/MIN/1.73
GLUCOSE BLD-MCNC: 105 MG/DL (ref 74–99)
GRAM STAIN RESULT: ABNORMAL
HCT VFR BLD CALC: 34.6 % (ref 34–48)
HEMOGLOBIN: 11.2 G/DL (ref 11.5–15.5)
IMMATURE GRANULOCYTES #: 0.08 E9/L
IMMATURE GRANULOCYTES %: 0.6 % (ref 0–5)
LYMPHOCYTES ABSOLUTE: 1.93 E9/L (ref 1.5–4)
LYMPHOCYTES RELATIVE PERCENT: 14.6 % (ref 20–42)
MAGNESIUM: 1.8 MG/DL (ref 1.6–2.6)
MCH RBC QN AUTO: 28 PG (ref 26–35)
MCHC RBC AUTO-ENTMCNC: 32.4 % (ref 32–34.5)
MCV RBC AUTO: 86.5 FL (ref 80–99.9)
MONOCYTES ABSOLUTE: 1.11 E9/L (ref 0.1–0.95)
MONOCYTES RELATIVE PERCENT: 8.4 % (ref 2–12)
NEUTROPHILS ABSOLUTE: 10 E9/L (ref 1.8–7.3)
NEUTROPHILS RELATIVE PERCENT: 75.9 % (ref 43–80)
ORGANISM: ABNORMAL
ORGANISM: ABNORMAL
PDW BLD-RTO: 13 FL (ref 11.5–15)
PLATELET # BLD: 235 E9/L (ref 130–450)
PMV BLD AUTO: 11 FL (ref 7–12)
POTASSIUM REFLEX MAGNESIUM: 3.3 MMOL/L (ref 3.5–5)
RBC # BLD: 4 E12/L (ref 3.5–5.5)
SODIUM BLD-SCNC: 134 MMOL/L (ref 132–146)
T4 FREE: 2.3 NG/DL (ref 0.93–1.7)
WBC # BLD: 13.2 E9/L (ref 4.5–11.5)

## 2021-07-31 PROCEDURE — 6360000002 HC RX W HCPCS: Performed by: CLINICAL NURSE SPECIALIST

## 2021-07-31 PROCEDURE — 6370000000 HC RX 637 (ALT 250 FOR IP): Performed by: STUDENT IN AN ORGANIZED HEALTH CARE EDUCATION/TRAINING PROGRAM

## 2021-07-31 PROCEDURE — 36415 COLL VENOUS BLD VENIPUNCTURE: CPT

## 2021-07-31 PROCEDURE — 99232 SBSQ HOSP IP/OBS MODERATE 35: CPT | Performed by: INTERNAL MEDICINE

## 2021-07-31 PROCEDURE — 6370000000 HC RX 637 (ALT 250 FOR IP): Performed by: INTERNAL MEDICINE

## 2021-07-31 PROCEDURE — 2580000003 HC RX 258: Performed by: STUDENT IN AN ORGANIZED HEALTH CARE EDUCATION/TRAINING PROGRAM

## 2021-07-31 PROCEDURE — 85025 COMPLETE CBC W/AUTO DIFF WBC: CPT

## 2021-07-31 PROCEDURE — 84439 ASSAY OF FREE THYROXINE: CPT

## 2021-07-31 PROCEDURE — 2580000003 HC RX 258: Performed by: CLINICAL NURSE SPECIALIST

## 2021-07-31 PROCEDURE — 80048 BASIC METABOLIC PNL TOTAL CA: CPT

## 2021-07-31 PROCEDURE — 1200000000 HC SEMI PRIVATE

## 2021-07-31 PROCEDURE — 99231 SBSQ HOSP IP/OBS SF/LOW 25: CPT | Performed by: INTERNAL MEDICINE

## 2021-07-31 PROCEDURE — 83735 ASSAY OF MAGNESIUM: CPT

## 2021-07-31 RX ORDER — POTASSIUM CHLORIDE 20 MEQ/1
40 TABLET, EXTENDED RELEASE ORAL ONCE
Status: COMPLETED | OUTPATIENT
Start: 2021-07-31 | End: 2021-07-31

## 2021-07-31 RX ADMIN — ACETAMINOPHEN 650 MG: 325 TABLET ORAL at 04:05

## 2021-07-31 RX ADMIN — OXYCODONE 5 MG: 5 TABLET ORAL at 10:47

## 2021-07-31 RX ADMIN — SODIUM CHLORIDE, PRESERVATIVE FREE 10 ML: 5 INJECTION INTRAVENOUS at 09:08

## 2021-07-31 RX ADMIN — METHIMAZOLE 20 MG: 10 TABLET ORAL at 22:05

## 2021-07-31 RX ADMIN — ACETAMINOPHEN 650 MG: 325 TABLET ORAL at 13:56

## 2021-07-31 RX ADMIN — ACETAMINOPHEN 650 MG: 325 TABLET ORAL at 20:06

## 2021-07-31 RX ADMIN — ERTAPENEM SODIUM 1000 MG: 1 INJECTION, POWDER, LYOPHILIZED, FOR SOLUTION INTRAMUSCULAR; INTRAVENOUS at 10:48

## 2021-07-31 RX ADMIN — POTASSIUM CHLORIDE 40 MEQ: 1500 TABLET, EXTENDED RELEASE ORAL at 10:07

## 2021-07-31 RX ADMIN — METHIMAZOLE 20 MG: 10 TABLET ORAL at 13:56

## 2021-07-31 RX ADMIN — PROPRANOLOL HYDROCHLORIDE 20 MG: 20 TABLET ORAL at 22:06

## 2021-07-31 RX ADMIN — METHIMAZOLE 20 MG: 10 TABLET ORAL at 09:07

## 2021-07-31 RX ADMIN — OXYCODONE 5 MG: 5 TABLET ORAL at 04:05

## 2021-07-31 RX ADMIN — ACETAMINOPHEN 650 MG: 325 TABLET ORAL at 09:07

## 2021-07-31 RX ADMIN — PROPRANOLOL HYDROCHLORIDE 20 MG: 20 TABLET ORAL at 09:07

## 2021-07-31 RX ADMIN — SODIUM CHLORIDE, POTASSIUM CHLORIDE, SODIUM LACTATE AND CALCIUM CHLORIDE: 600; 310; 30; 20 INJECTION, SOLUTION INTRAVENOUS at 20:02

## 2021-07-31 RX ADMIN — OXYCODONE 5 MG: 5 TABLET ORAL at 23:37

## 2021-07-31 RX ADMIN — PROPRANOLOL HYDROCHLORIDE 20 MG: 20 TABLET ORAL at 13:56

## 2021-07-31 RX ADMIN — OXYCODONE 5 MG: 5 TABLET ORAL at 17:59

## 2021-07-31 ASSESSMENT — PAIN DESCRIPTION - LOCATION
LOCATION: ABDOMEN

## 2021-07-31 ASSESSMENT — PAIN SCALES - GENERAL
PAINLEVEL_OUTOF10: 5
PAINLEVEL_OUTOF10: 4
PAINLEVEL_OUTOF10: 0
PAINLEVEL_OUTOF10: 6
PAINLEVEL_OUTOF10: 6
PAINLEVEL_OUTOF10: 7
PAINLEVEL_OUTOF10: 9
PAINLEVEL_OUTOF10: 5
PAINLEVEL_OUTOF10: 5
PAINLEVEL_OUTOF10: 6
PAINLEVEL_OUTOF10: 5

## 2021-07-31 ASSESSMENT — PAIN DESCRIPTION - ONSET
ONSET: ON-GOING

## 2021-07-31 ASSESSMENT — PAIN DESCRIPTION - DESCRIPTORS
DESCRIPTORS: DISCOMFORT;JABBING;SORE
DESCRIPTORS: CONSTANT;SHARP
DESCRIPTORS: CONSTANT;DISCOMFORT
DESCRIPTORS: ACHING;DISCOMFORT
DESCRIPTORS: ACHING;DISCOMFORT

## 2021-07-31 ASSESSMENT — PAIN DESCRIPTION - ORIENTATION
ORIENTATION: LEFT;LOWER
ORIENTATION: RIGHT;LOWER
ORIENTATION: LOWER;RIGHT
ORIENTATION: RIGHT;LOWER

## 2021-07-31 ASSESSMENT — PAIN - FUNCTIONAL ASSESSMENT
PAIN_FUNCTIONAL_ASSESSMENT: PREVENTS OR INTERFERES SOME ACTIVE ACTIVITIES AND ADLS
PAIN_FUNCTIONAL_ASSESSMENT: PREVENTS OR INTERFERES SOME ACTIVE ACTIVITIES AND ADLS
PAIN_FUNCTIONAL_ASSESSMENT: ACTIVITIES ARE NOT PREVENTED
PAIN_FUNCTIONAL_ASSESSMENT: PREVENTS OR INTERFERES SOME ACTIVE ACTIVITIES AND ADLS
PAIN_FUNCTIONAL_ASSESSMENT: PREVENTS OR INTERFERES SOME ACTIVE ACTIVITIES AND ADLS

## 2021-07-31 ASSESSMENT — PAIN DESCRIPTION - PAIN TYPE
TYPE: ACUTE PAIN;SURGICAL PAIN
TYPE: SURGICAL PAIN
TYPE: ACUTE PAIN;SURGICAL PAIN
TYPE: SURGICAL PAIN
TYPE: SURGICAL PAIN

## 2021-07-31 ASSESSMENT — PAIN DESCRIPTION - PROGRESSION
CLINICAL_PROGRESSION: GRADUALLY WORSENING
CLINICAL_PROGRESSION: NOT CHANGED

## 2021-07-31 ASSESSMENT — PAIN DESCRIPTION - FREQUENCY
FREQUENCY: CONTINUOUS
FREQUENCY: INTERMITTENT
FREQUENCY: CONTINUOUS

## 2021-07-31 NOTE — PLAN OF CARE
Problem: Pain:  Goal: Pain level will decrease  Description: Pain level will decrease  Outcome: Met This Shift     Problem: Physical Regulation:  Goal: Ability to maintain a body temperature in the normal range will improve  Description: Ability to maintain a body temperature in the normal range will improve  Outcome: Met This Shift     Problem: Sensory:  Goal: Pain level will decrease  Description: Pain level will decrease  Outcome: Met This Shift     Problem: Skin Integrity:  Goal: Demonstration of wound healing without infection will improve  Description: Demonstration of wound healing without infection will improve  Outcome: Met This Shift     Problem: Falls - Risk of:  Goal: Will remain free from falls  Description: Will remain free from falls  Outcome: Met This Shift

## 2021-07-31 NOTE — PROGRESS NOTES
ENDOCRINOLOGY PROGRESS NOTE    Date of Admission: 7/28/2021  Date of Service: 7/31/2021  Admitting Physician: Binh Laura MD   Primary Care Physician: Jakob Szymanski DO  Consultant physician: Jozef Warren MD     Reason for the consultation:  Hyperthyroidism     History of Present Illness: The history is provided by the patient. Accuracy of the patient data is excellent. Rinku Hurst is a very pleasant 22 y.o. old female with PMH obesity, hyperthyroidism admitted to [facility] on 7/28/2021 because of nausea vomiting and abdominal pain and found to have acute appendicitis, endocrine service was consulted for management of hyperthyroidism    Subjective:   Seen and examined this AM, still c/o abdominal pain,      Scheduled Meds:   potassium chloride  40 mEq Oral Once    ertapenem (INVanz) IVPB  1,000 mg Intravenous Q24H    propranolol  20 mg Oral TID    methIMAzole  20 mg Oral TID    acetaminophen  650 mg Oral Q6H    sodium chloride flush  5-40 mL Intravenous 2 times per day    [Held by provider] enoxaparin  40 mg Subcutaneous Daily     PRN Meds:   oxyCODONE, 5 mg, Q4H PRN  benzocaine-menthol, 1 lozenge, Q2H PRN  sodium chloride flush, 5-40 mL, PRN  sodium chloride, 25 mL, PRN  ondansetron, 4 mg, Q8H PRN   Or  ondansetron, 4 mg, Q6H PRN  HYDROmorphone, 0.5 mg, Q3H PRN   Or  HYDROmorphone, 1 mg, Q3H PRN      Continuous Infusions:   sodium chloride      lactated ringers 100 mL/hr at 07/30/21 1310       Review of Systems  All systems reviewed.  All negative except for symptoms mentioned in HPI     OBJECTIVE    /60   Pulse 103   Temp 99.6 °F (37.6 °C) (Infrared)   Resp 18   Ht 5' 2\" (1.575 m)   Wt 263 lb 6.4 oz (119.5 kg)   LMP 07/22/2021   SpO2 95%   BMI 48.18 kg/m²   Wt Readings from Last 6 Encounters:   07/28/21 263 lb 6.4 oz (119.5 kg)   07/27/21 225 lb (102.1 kg)   07/27/21 225 lb (102.1 kg)   04/27/21 225 lb (102.1 kg)   12/16/20 212 lb (96.2 kg)   10/03/20 212 lb (96.2 kg) Physical examination:  General: awake alert, oriented x3, no abnormal position or movements. HEENT: normocephalic non traumatic, no exophthalmos, no lid lag, no lid retraction   Neck: supple, no LN enlargement, + thyromegaly, Large Rt side nodule palpable, no thyroid tenderness, no thyroid bruit   Pulm: Clear equal air entry no added sounds, no wheezing or rhonchi    CVS: S1 + S2, no murmur, no heave. Dorsalis pedis pulse palpable   Abd: abdominal pain, s/p surgery   Skin: warm, no lesions, no rash.     Musculoskeletal: No back tenderness, no kyphosis/scoliosis    Neuro: CN intact, sensation notmal , muscle power normal. + tremors   Psych: normal mood, and affect    Review of Laboratory Data:  I personally reviewed the following labs:  Recent Labs     07/29/21  0230 07/30/21  0740 07/31/21  0610   WBC 15.4* 13.5* 13.2*   RBC 4.23 3.79 4.00   HGB 11.6 10.5* 11.2*   HCT 36.0 33.3* 34.6   MCV 85.1 87.9 86.5   MCH 27.4 27.7 28.0   MCHC 32.2 31.5* 32.4   RDW 13.2 13.1 13.0    196 235   MPV 10.8 11.1 11.0     Recent Labs     07/29/21  0230 07/30/21  0740 07/31/21  0610    140 134   K 3.7 3.9 3.3*    102 99   CO2 26 25 25   BUN 13 15 11   CREATININE 0.4* 0.5 0.5   GLUCOSE 124* 79 105*   CALCIUM 9.4 8.4* 8.3*     No results found for: BHYDRXBUT  Lab Results   Component Value Date    LABA1C 5.3 07/15/2019     Lab Results   Component Value Date/Time    TSH <0.010 (L) 07/30/2021 04:20 PM    T4FREE 2.30 (H) 07/31/2021 06:10 AM    W5BCQGF 10.7 07/30/2021 04:20 PM    FT3 16.0 (H) 02/27/2020 02:17 AM    FT3 3.9 11/01/2017 12:35 PM    L2UCTMR 383.30 (H) 06/05/2019 01:50 PM    TSI 96 06/05/2019 01:50 PM    TPOABS 0.8 06/05/2019 01:50 PM    THGAB <0.9 06/05/2019 01:50 PM     Lab Results   Component Value Date    LABA1C 5.3 07/15/2019    GLUCOSE 105 07/31/2021     No results found for: TRIG, HDL, LDLCALC, CHOL    Blood culture   Lab Results   Component Value Date    BC 24 Hours no growth 07/29/2021    BC 5 Days- no growth 02/16/2019     Medical Records/Labs/Images Review:   I personally reviewed and summarized previous records   All labs and imaging were reviewed independently    Dasia Singleton, a 22 y.o.-old female seen in for management of Hyperthyroidism     Hyperthyroidism  · Likely due to toxic multinodular goiter. Other DD include thyroiditis vs graves disease  · TSI, TPO-Ab, Tg-Ab in process   · Continue Methimazole 20 mg TID (will likely decrease dose tomorrow)  · continue Propranolol 20 mg TID   · Obtain thyroid US tomorrow or Monday    · Daily free T4 while inpatient      Multinodular goiter   · I have reviewed previous thyroid US that was done in 2019. Largest nodule in the Rt lobe measuring 4 cm   · Will obtain thyroid US   · Discuss thyroid surgery vs AVILEZ ablation     Tachycardia   · Improving, Likely multifactorial hyperthyroidism, pain, fever    The above issues were reviewed with the patient who understood and agreed with the plan. Thank you for allowing us to participate in the care of this patient. Please do not hesitate to contact us with any additional questions. Ehsan Looney MD  Endocrinologist, [facility]   [address] [place] [address]   Phone: [phone]  Fax: [phone]  ---------------------------------  An electronic signature was used to authenticate this note.  Darlene Rodriguez MD on 7/31/2021 at 9:48 AM

## 2021-07-31 NOTE — PROGRESS NOTES
07/29/2021    HGB 11.2 (L) 07/31/2021    HCT 34.6 07/31/2021    MCV 86.5 07/31/2021     07/31/2021     Lab Results   Component Value Date    NEUTROABS 10.00 (H) 07/31/2021    NEUTROABS 10.65 (H) 07/30/2021    NEUTROABS 11.60 (H) 07/28/2021     No results found for: CRPHS  Lab Results   Component Value Date    ALT 26 07/28/2021    AST 20 07/28/2021    ALKPHOS 172 (H) 07/28/2021    BILITOT 1.1 07/28/2021     Lab Results   Component Value Date     07/31/2021    K 3.3 07/31/2021    CL 99 07/31/2021    CO2 25 07/31/2021    BUN 11 07/31/2021    CREATININE 0.5 07/31/2021    CREATININE 0.5 07/30/2021    CREATININE 0.4 07/29/2021    GFRAA >60 07/31/2021    LABGLOM >60 07/31/2021    GLUCOSE 105 07/31/2021    PROT 7.8 07/28/2021    LABALBU 3.8 07/28/2021    CALCIUM 8.3 07/31/2021    BILITOT 1.1 07/28/2021    ALKPHOS 172 07/28/2021    AST 20 07/28/2021    ALT 26 07/28/2021     Lab Results   Component Value Date    CRP 12.1 (H) 07/28/2021     Lab Results   Component Value Date    SEDRATE 25 (H) 07/28/2021     Radiology:  No new studies     Microbiology:   7/28/2021- gram stain- body fluid- E.coli x 2 morphologies   No anaerobes   Blood Culture- negative so far  MRSA screen- not isolated  Fungitell - pending     ASSESSMENT:  · Acute appendicitis   · S/p attempted lap appendectomy with drain placement   · Leukocytosis associated to appendicitis   · Fevers - low grade, improving     PLAN:  · Continue Ertapenem for now   · Fungitell pending   · Surgery following - drain in place  · ? Need for repeat CT - persistent leukocytosis    · Check final cultures   · Monitor labs- WBC 13.2 today, not much improvement     Tor Saliva, APRN - CNP  9:59 AM  7/31/2021     Patient seen and examined. I had a face to face encounter with the patient. Agree with exam.  Assessment and plan as outlined above and directed by me. Addition and corrections were done as deemed appropriate. My exam and plan include:  The patient is still complaining of some pain. Her white count still elevated. She seems to be tolerating antibiotics. Continue Ertapenem. If white count does not come down she will need a repeat CT of the abdomen and pelvis.     Gloria Marie MD  7/31/2021  2:02 PM

## 2021-07-31 NOTE — PROGRESS NOTES
Patient states she feels pretty well. She is having multiple bowel movements. She is tolerating liquid diet. She is hungry for something better. On exam her abdomen is soft nondistended with mild diffuse tenderness. All incisions are intact. Drain is serous. Will advance diet. Continue to increase activity.

## 2021-07-31 NOTE — PROGRESS NOTES
[facility] Hospital[facility]   Progress Note    Admitting Date and Time: 7/28/2021  5:03 AM  Admit Dx: Acute appendicitis [K35.80]     Seen for follow on multiple problems as listed below. Subjective:  Reviewed with nursing , still tachycardic but HR better controlled , this am 103. Uneventful night. Denies CP or sob. Abdominal pain well controlled with current meds. ROS: denies fever, chills, cp, sob, n/v, HA unless stated above.      ertapenem (INVanz) IVPB  1,000 mg Intravenous Q24H    propranolol  20 mg Oral TID    methIMAzole  20 mg Oral TID    acetaminophen  650 mg Oral Q6H    sodium chloride flush  5-40 mL Intravenous 2 times per day    [Held by provider] enoxaparin  40 mg Subcutaneous Daily     oxyCODONE, 5 mg, Q4H PRN  benzocaine-menthol, 1 lozenge, Q2H PRN  sodium chloride flush, 5-40 mL, PRN  sodium chloride, 25 mL, PRN  ondansetron, 4 mg, Q8H PRN   Or  ondansetron, 4 mg, Q6H PRN  HYDROmorphone, 0.5 mg, Q3H PRN   Or  HYDROmorphone, 1 mg, Q3H PRN         Objective:    /60   Pulse 103   Temp 99.6 °F (37.6 °C) (Infrared)   Resp 18   Ht 5' 2\" (1.575 m)   Wt 263 lb 6.4 oz (119.5 kg)   LMP 07/22/2021   SpO2 95%   BMI 48.18 kg/m²   General Appearance: alert and oriented to person, place and time, in no acute distress  Skin: warm and dry  Head: normocephalic and atraumatic  Eyes: pupils equal, round, and reactive to light, extraocular eye movements intact, conjunctivae normal  Neck: supple and non-tender without mass  Pulmonary/Chest: clear to auscultation bilaterally  Cardiovascular:Improving ST , regular rhythm, normal S1 and S2  Abdomen: soft, appropriate postop tenderness drain right lower quadrant, non-distended, normal bowel sounds, no masses or organomegaly  Extremities: no cyanosis, clubbing   Musculoskeletal: normal range of motion  Neurologic:  no cranial nerve deficit,  speech normal      Recent Labs     07/29/21  0230 07/30/21  0740 07/31/21  0610    140 134 K 3.7 3.9 3.3*    102 99   CO2 26 25 25   BUN 13 15 11   CREATININE 0.4* 0.5 0.5   GLUCOSE 124* 79 105*   CALCIUM 9.4 8.4* 8.3*       Recent Labs     07/29/21  0230 07/30/21  0740 07/31/21  0610   WBC 15.4* 13.5* 13.2*   RBC 4.23 3.79 4.00   HGB 11.6 10.5* 11.2*   HCT 36.0 33.3* 34.6   MCV 85.1 87.9 86.5   MCH 27.4 27.7 28.0   MCHC 32.2 31.5* 32.4   RDW 13.2 13.1 13.0    196 235   MPV 10.8 11.1 11.0       Labs and images reviewed     Radiology:   No orders to display       Assessment:    Principal Problem:    Acute appendicitis  Active Problems:    Sinus tachycardia  Resolved Problems:    * No resolved hospital problems. *      Plan:  Persistent sinus tachycardia-multifactorial.  Likely secondary to untreated hyperthyroidism, pain, fever and ongoing inflammation with perforated appendicitis. Will treat with Inderal with parameters to hold. Still tachycardic but better as above . Monitor.     Untreated hyperthyroidism-Dr Becerril consulted & d/w him. Repeat TSH low , T4 elevated  thyroid-stimulating immunoglobulin, thyroid peroxidase antibody and antithyroid antibody pending ,  Dr. Jackeline Robertson following. Will need outpatient follow-up. Has hot nodule in the right thyroid as per patient.       Perforated appendicitis s/p aborted laparoscopic appendectomy, with abdominal washout and drain placement. Continue antibiotics as per ID recommendation and postop care as per surgery.   Was on IV ceftriaxone plus Flagyl followed by IV Unasyn, now on IV ertapenem.            Electronically signed by Kimberly Conley MD on 7/31/2021 at 8:56 AM

## 2021-08-01 ENCOUNTER — APPOINTMENT (OUTPATIENT)
Dept: ULTRASOUND IMAGING | Age: 26
DRG: 720 | End: 2021-08-01
Attending: SURGERY
Payer: COMMERCIAL

## 2021-08-01 ENCOUNTER — APPOINTMENT (OUTPATIENT)
Dept: CT IMAGING | Age: 26
DRG: 720 | End: 2021-08-01
Attending: SURGERY
Payer: COMMERCIAL

## 2021-08-01 LAB
ANION GAP SERPL CALCULATED.3IONS-SCNC: 5 MMOL/L (ref 7–16)
BASOPHILS ABSOLUTE: 0.02 E9/L (ref 0–0.2)
BASOPHILS RELATIVE PERCENT: 0.2 % (ref 0–2)
BUN BLDV-MCNC: 10 MG/DL (ref 6–20)
CALCIUM SERPL-MCNC: 8.2 MG/DL (ref 8.6–10.2)
CHLORIDE BLD-SCNC: 102 MMOL/L (ref 98–107)
CO2: 28 MMOL/L (ref 22–29)
CREAT SERPL-MCNC: 0.5 MG/DL (ref 0.5–1)
EOSINOPHILS ABSOLUTE: 0.03 E9/L (ref 0.05–0.5)
EOSINOPHILS RELATIVE PERCENT: 0.2 % (ref 0–6)
GFR AFRICAN AMERICAN: >60
GFR NON-AFRICAN AMERICAN: >60 ML/MIN/1.73
GLUCOSE BLD-MCNC: 103 MG/DL (ref 74–99)
HCT VFR BLD CALC: 30.1 % (ref 34–48)
HEMOGLOBIN: 9.7 G/DL (ref 11.5–15.5)
IMMATURE GRANULOCYTES #: 0.07 E9/L
IMMATURE GRANULOCYTES %: 0.5 % (ref 0–5)
LYMPHOCYTES ABSOLUTE: 2.18 E9/L (ref 1.5–4)
LYMPHOCYTES RELATIVE PERCENT: 16.8 % (ref 20–42)
MCH RBC QN AUTO: 27.7 PG (ref 26–35)
MCHC RBC AUTO-ENTMCNC: 32.2 % (ref 32–34.5)
MCV RBC AUTO: 86 FL (ref 80–99.9)
MONOCYTES ABSOLUTE: 1.08 E9/L (ref 0.1–0.95)
MONOCYTES RELATIVE PERCENT: 8.3 % (ref 2–12)
NEUTROPHILS ABSOLUTE: 9.61 E9/L (ref 1.8–7.3)
NEUTROPHILS RELATIVE PERCENT: 74 % (ref 43–80)
PDW BLD-RTO: 13.2 FL (ref 11.5–15)
PLATELET # BLD: 224 E9/L (ref 130–450)
PMV BLD AUTO: 11.1 FL (ref 7–12)
POTASSIUM REFLEX MAGNESIUM: 3.7 MMOL/L (ref 3.5–5)
RBC # BLD: 3.5 E12/L (ref 3.5–5.5)
SODIUM BLD-SCNC: 135 MMOL/L (ref 132–146)
T4 FREE: 1.66 NG/DL (ref 0.93–1.7)
WBC # BLD: 13 E9/L (ref 4.5–11.5)

## 2021-08-01 PROCEDURE — 76536 US EXAM OF HEAD AND NECK: CPT

## 2021-08-01 PROCEDURE — 36415 COLL VENOUS BLD VENIPUNCTURE: CPT

## 2021-08-01 PROCEDURE — 6360000002 HC RX W HCPCS: Performed by: CLINICAL NURSE SPECIALIST

## 2021-08-01 PROCEDURE — 6370000000 HC RX 637 (ALT 250 FOR IP): Performed by: INTERNAL MEDICINE

## 2021-08-01 PROCEDURE — 2580000003 HC RX 258: Performed by: STUDENT IN AN ORGANIZED HEALTH CARE EDUCATION/TRAINING PROGRAM

## 2021-08-01 PROCEDURE — 74177 CT ABD & PELVIS W/CONTRAST: CPT

## 2021-08-01 PROCEDURE — 6360000004 HC RX CONTRAST MEDICATION: Performed by: RADIOLOGY

## 2021-08-01 PROCEDURE — 1200000000 HC SEMI PRIVATE

## 2021-08-01 PROCEDURE — 80048 BASIC METABOLIC PNL TOTAL CA: CPT

## 2021-08-01 PROCEDURE — 2580000003 HC RX 258: Performed by: CLINICAL NURSE SPECIALIST

## 2021-08-01 PROCEDURE — 84439 ASSAY OF FREE THYROXINE: CPT

## 2021-08-01 PROCEDURE — 6370000000 HC RX 637 (ALT 250 FOR IP): Performed by: STUDENT IN AN ORGANIZED HEALTH CARE EDUCATION/TRAINING PROGRAM

## 2021-08-01 PROCEDURE — 6360000002 HC RX W HCPCS: Performed by: STUDENT IN AN ORGANIZED HEALTH CARE EDUCATION/TRAINING PROGRAM

## 2021-08-01 PROCEDURE — 99231 SBSQ HOSP IP/OBS SF/LOW 25: CPT | Performed by: INTERNAL MEDICINE

## 2021-08-01 PROCEDURE — 99232 SBSQ HOSP IP/OBS MODERATE 35: CPT | Performed by: INTERNAL MEDICINE

## 2021-08-01 PROCEDURE — 85025 COMPLETE CBC W/AUTO DIFF WBC: CPT

## 2021-08-01 RX ADMIN — IOPAMIDOL 75 ML: 755 INJECTION, SOLUTION INTRAVENOUS at 11:12

## 2021-08-01 RX ADMIN — OXYCODONE 5 MG: 5 TABLET ORAL at 16:17

## 2021-08-01 RX ADMIN — OXYCODONE 5 MG: 5 TABLET ORAL at 21:17

## 2021-08-01 RX ADMIN — ACETAMINOPHEN 650 MG: 325 TABLET ORAL at 21:17

## 2021-08-01 RX ADMIN — ERTAPENEM SODIUM 1000 MG: 1 INJECTION, POWDER, LYOPHILIZED, FOR SOLUTION INTRAMUSCULAR; INTRAVENOUS at 11:53

## 2021-08-01 RX ADMIN — METHIMAZOLE 20 MG: 10 TABLET ORAL at 16:13

## 2021-08-01 RX ADMIN — ACETAMINOPHEN 650 MG: 325 TABLET ORAL at 08:46

## 2021-08-01 RX ADMIN — HYDROMORPHONE HYDROCHLORIDE 0.5 MG: 1 INJECTION, SOLUTION INTRAMUSCULAR; INTRAVENOUS; SUBCUTANEOUS at 02:38

## 2021-08-01 RX ADMIN — SODIUM CHLORIDE, POTASSIUM CHLORIDE, SODIUM LACTATE AND CALCIUM CHLORIDE: 600; 310; 30; 20 INJECTION, SOLUTION INTRAVENOUS at 04:56

## 2021-08-01 RX ADMIN — ACETAMINOPHEN 650 MG: 325 TABLET ORAL at 03:37

## 2021-08-01 RX ADMIN — PROPRANOLOL HYDROCHLORIDE 20 MG: 20 TABLET ORAL at 08:46

## 2021-08-01 RX ADMIN — PROPRANOLOL HYDROCHLORIDE 20 MG: 20 TABLET ORAL at 21:18

## 2021-08-01 RX ADMIN — OXYCODONE 5 MG: 5 TABLET ORAL at 07:04

## 2021-08-01 RX ADMIN — ACETAMINOPHEN 650 MG: 325 TABLET ORAL at 16:14

## 2021-08-01 RX ADMIN — OXYCODONE 5 MG: 5 TABLET ORAL at 11:53

## 2021-08-01 RX ADMIN — METHIMAZOLE 20 MG: 10 TABLET ORAL at 21:18

## 2021-08-01 RX ADMIN — SODIUM CHLORIDE, POTASSIUM CHLORIDE, SODIUM LACTATE AND CALCIUM CHLORIDE: 600; 310; 30; 20 INJECTION, SOLUTION INTRAVENOUS at 23:35

## 2021-08-01 RX ADMIN — METHIMAZOLE 20 MG: 10 TABLET ORAL at 08:46

## 2021-08-01 RX ADMIN — PROPRANOLOL HYDROCHLORIDE 20 MG: 20 TABLET ORAL at 16:14

## 2021-08-01 ASSESSMENT — PAIN SCALES - GENERAL
PAINLEVEL_OUTOF10: 4
PAINLEVEL_OUTOF10: 6
PAINLEVEL_OUTOF10: 7
PAINLEVEL_OUTOF10: 4
PAINLEVEL_OUTOF10: 6
PAINLEVEL_OUTOF10: 0
PAINLEVEL_OUTOF10: 0
PAINLEVEL_OUTOF10: 5
PAINLEVEL_OUTOF10: 0
PAINLEVEL_OUTOF10: 0
PAINLEVEL_OUTOF10: 6

## 2021-08-01 ASSESSMENT — PAIN DESCRIPTION - DESCRIPTORS
DESCRIPTORS: DISCOMFORT;SORE;PRESSURE
DESCRIPTORS: ACHING;DISCOMFORT
DESCRIPTORS: BURNING;DISCOMFORT;PRESSURE
DESCRIPTORS: ACHING;DISCOMFORT;PRESSURE
DESCRIPTORS: DISCOMFORT;SORE;PRESSURE

## 2021-08-01 ASSESSMENT — PAIN DESCRIPTION - ORIENTATION
ORIENTATION: RIGHT

## 2021-08-01 ASSESSMENT — PAIN - FUNCTIONAL ASSESSMENT

## 2021-08-01 ASSESSMENT — PAIN DESCRIPTION - PAIN TYPE
TYPE: SURGICAL PAIN
TYPE: ACUTE PAIN;SURGICAL PAIN
TYPE: ACUTE PAIN;SURGICAL PAIN
TYPE: SURGICAL PAIN
TYPE: SURGICAL PAIN

## 2021-08-01 ASSESSMENT — PAIN DESCRIPTION - FREQUENCY
FREQUENCY: INTERMITTENT

## 2021-08-01 ASSESSMENT — PAIN DESCRIPTION - LOCATION
LOCATION: ABDOMEN

## 2021-08-01 ASSESSMENT — PAIN DESCRIPTION - ONSET
ONSET: ON-GOING

## 2021-08-01 ASSESSMENT — PAIN DESCRIPTION - PROGRESSION
CLINICAL_PROGRESSION: GRADUALLY WORSENING
CLINICAL_PROGRESSION: RAPIDLY IMPROVING

## 2021-08-01 NOTE — PLAN OF CARE
Problem: Physical Regulation:  Goal: Ability to maintain a body temperature in the normal range will improve  Description: Ability to maintain a body temperature in the normal range will improve  Outcome: Met This Shift  Goal: Ability to maintain vital signs within normal range will improve  Description: Ability to maintain vital signs within normal range will improve  Outcome: Met This Shift  Goal: Postoperative complications will be avoided or minimized  Description: Postoperative complications will be avoided or minimized  Outcome: Met This Shift  Goal: Diagnostic test results will improve  Description: Diagnostic test results will improve  Outcome: Met This Shift  Goal: Will remain free from infection  Description: Will remain free from infection  Outcome: Met This Shift     Problem: Nutritional:  Goal: Maintenance of adequate nutrition will improve  Description: Maintenance of adequate nutrition will improve  Outcome: Met This Shift  Goal: Nutritional status will improve  Description: Nutritional status will improve  Outcome: Met This Shift     Problem: Skin Integrity:  Goal: Demonstration of wound healing without infection will improve  Description: Demonstration of wound healing without infection will improve  Outcome: Met This Shift  Goal: Complications related to intravenous access or infusion will be avoided or minimized  Description: Complications related to intravenous access or infusion will be avoided or minimized  Outcome: Met This Shift     Problem: Respiratory:  Goal: Ability to maintain normal respiratory secretions will improve  Description: Ability to maintain normal respiratory secretions will improve  Outcome: Met This Shift

## 2021-08-01 NOTE — PROGRESS NOTES
ENDOCRINOLOGY PROGRESS NOTE    Date of Admission: 7/28/2021  Date of Service: 8/1/2021  Admitting Physician: Venancio Dillon MD   Primary Care Physician: Jakob Szymanski DO  Consultant physician: Jozef Warren MD     Reason for the consultation:  Hyperthyroidism     History of Present Illness: The history is provided by the patient. Accuracy of the patient data is excellent. Rinku Hurst is a very pleasant 22 y.o. old female with PMH obesity, hyperthyroidism admitted to [facility] on 7/28/2021 because of nausea vomiting and abdominal pain and found to have acute appendicitis, endocrine service was consulted for management of hyperthyroidism    Subjective:   Seen and examined this AM, still c/o abdominal pain,      Scheduled Meds:   ertapenem (INVanz) IVPB  1,000 mg Intravenous Q24H    propranolol  20 mg Oral TID    methIMAzole  20 mg Oral TID    acetaminophen  650 mg Oral Q6H    sodium chloride flush  5-40 mL Intravenous 2 times per day    [Held by provider] enoxaparin  40 mg Subcutaneous Daily     PRN Meds:   oxyCODONE, 5 mg, Q4H PRN  benzocaine-menthol, 1 lozenge, Q2H PRN  sodium chloride flush, 5-40 mL, PRN  sodium chloride, 25 mL, PRN  ondansetron, 4 mg, Q8H PRN   Or  ondansetron, 4 mg, Q6H PRN  HYDROmorphone, 0.5 mg, Q3H PRN   Or  HYDROmorphone, 1 mg, Q3H PRN      Continuous Infusions:   sodium chloride      lactated ringers 100 mL/hr at 08/01/21 0456       Review of Systems  All systems reviewed.  All negative except for symptoms mentioned in HPI     OBJECTIVE    BP (!) 122/54   Pulse 100   Temp 99 °F (37.2 °C) (Oral)   Resp 16   Ht 5' 2\" (1.575 m)   Wt 263 lb 6.4 oz (119.5 kg)   LMP 07/22/2021   SpO2 99%   BMI 48.18 kg/m²   Wt Readings from Last 6 Encounters:   07/28/21 263 lb 6.4 oz (119.5 kg)   07/27/21 225 lb (102.1 kg)   07/27/21 225 lb (102.1 kg)   04/27/21 225 lb (102.1 kg)   12/16/20 212 lb (96.2 kg)   10/03/20 212 lb (96.2 kg)       Physical examination:  General: awake Records/Labs/Images Review:   I personally reviewed and summarized previous records   All labs and imaging were reviewed independently    Dasia Tang., a 22 y.o.-old female seen in for management of Hyperthyroidism     Hyperthyroidism  · Due to toxic multinodular goiter. TSI, TPO-Ab, Tg-Ab are negative   · Free T4 WNL today   · For today will continue Methimazole 20 mg TID    · continue Propranolol 20 mg TID will likely dc in few days   · Ordered thyroid US    · Daily free T4 while inpatient      Multinodular goiter   · I have reviewed previous thyroid US that was done in 2019. Largest nodule in the Rt lobe measuring 4 cm   · Ordered Thyroid US     Perforated appendicitis   · s/p aborted laparoscopic appendectomy  · Management per surgery, and ID service     Tachycardia   · Improving, Likely multifactorial hyperthyroidism, pain, fever    The above issues were reviewed with the patient who understood and agreed with the plan. Thank you for allowing us to participate in the care of this patient. Please do not hesitate to contact us with any additional questions. Odilon Roth MD  Endocrinologist, [place] [address] [facility]   [address] [place] [id number]   Phone: [phone]  Fax: [phone]  ---------------------------------  An electronic signature was used to authenticate this note.  Joann Olvera MD on 8/1/2021 at 11:41 AM

## 2021-08-01 NOTE — PROGRESS NOTES
[facility] Hospital[facility]   Progress Note    Admitting Date and Time: 7/28/2021  5:03 AM  Admit Dx: Acute appendicitis [K35.80]     Seen for follow on multiple problems as listed below. Subjective:  HR better controlled in 100's. Denies CP ,sob . Has fever and increased abd pain. ID & GS following. D/w nursing. ROS: denies fever, chills, cp, sob, n/v, HA unless stated above.      ertapenem (INVanz) IVPB  1,000 mg Intravenous Q24H    propranolol  20 mg Oral TID    methIMAzole  20 mg Oral TID    acetaminophen  650 mg Oral Q6H    sodium chloride flush  5-40 mL Intravenous 2 times per day    [Held by provider] enoxaparin  40 mg Subcutaneous Daily     oxyCODONE, 5 mg, Q4H PRN  benzocaine-menthol, 1 lozenge, Q2H PRN  sodium chloride flush, 5-40 mL, PRN  sodium chloride, 25 mL, PRN  ondansetron, 4 mg, Q8H PRN   Or  ondansetron, 4 mg, Q6H PRN  HYDROmorphone, 0.5 mg, Q3H PRN   Or  HYDROmorphone, 1 mg, Q3H PRN         Objective:    BP (!) 122/54   Pulse 100   Temp 99 °F (37.2 °C) (Oral)   Resp 16   Ht 5' 2\" (1.575 m)   Wt 263 lb 6.4 oz (119.5 kg)   LMP 07/22/2021   SpO2 99%   BMI 48.18 kg/m²   General Appearance: alert and oriented to person, place and time, in no acute distress  Skin: warm and dry  Head: normocephalic and atraumatic  Eyes: pupils equal, round, and reactive to light, extraocular eye movements intact, conjunctivae normal  Neck: supple and non-tender without mass  Pulmonary/Chest: clear to auscultation bilaterally  Cardiovascular:Improving ST , regular rhythm, normal S1 and S2  Abdomen: soft, appropriate postop tenderness drain right lower quadrant, non-distended, normal bowel sounds, no masses or organomegaly  Extremities: no cyanosis, clubbing   Musculoskeletal: normal range of motion  Neurologic:  no cranial nerve deficit,  speech normal      Recent Labs     07/30/21  0740 07/31/21  0610 08/01/21  0425    134 135   K 3.9 3.3* 3.7    99 102   CO2 25 25 28   BUN 15 11 10   CREATININE 0.5 0.5 0.5   GLUCOSE 79 105* 103*   CALCIUM 8.4* 8.3* 8.2*       Recent Labs     07/30/21  0740 07/31/21  0610 08/01/21  0425   WBC 13.5* 13.2* 13.0*   RBC 3.79 4.00 3.50   HGB 10.5* 11.2* 9.7*   HCT 33.3* 34.6 30.1*   MCV 87.9 86.5 86.0   MCH 27.7 28.0 27.7   MCHC 31.5* 32.4 32.2   RDW 13.1 13.0 13.2    235 224   MPV 11.1 11.0 11.1       Labs and images reviewed     Radiology:   No orders to display       Assessment:    Principal Problem:    Acute appendicitis  Active Problems:    Sinus tachycardia  Resolved Problems:    * No resolved hospital problems. *      Plan:  Persistent sinus tachycardia-multifactorial.  Likely secondary to untreated hyperthyroidism, pain, fever and ongoing inflammation with perforated appendicitis. Will treat with Inderal with parameters to hold. Still tachycardic but better as above . Monitor.     Untreated hyperthyroidism-Dr Becerril consulted & d/w him. Repeat TSH low , T4 elevated  thyroid-stimulating immunoglobulin, thyroid peroxidase antibody and antithyroid antibody pending ,  Dr. Marianna Myers following. Will need outpatient follow-up. Has hot nodule in the right thyroid as per patient. Reviewed endo notes. Started on Methimazole. For thyroid us tomorrow.     Perforated appendicitis s/p aborted laparoscopic appendectomy, with abdominal washout and drain placement. Continue antibiotics as per ID recommendation and postop care as per surgery.   Was on IV ceftriaxone plus Flagyl followed by IV Unasyn, now on IV ertapenem.            Electronically signed by Emmanuel Everett MD on 8/1/2021 at 7:35 AM

## 2021-08-01 NOTE — PROGRESS NOTES
[address] [place] [address] [facility]  Progress Note    SUBJECTIVE:  CC: abdominal pain     Patient is tolerating medications. No nausea, vomiting, diarrhea. Stools are loose   +chills, sweats   Tearful, increased abdominal pain  Febrile overnight - temp range  .9     Review of systems:  As stated above in the chief complaint, otherwise negative. Medications:  Scheduled Meds:   ertapenem (INVanz) IVPB  1,000 mg Intravenous Q24H    propranolol  20 mg Oral TID    methIMAzole  20 mg Oral TID    acetaminophen  650 mg Oral Q6H    sodium chloride flush  5-40 mL Intravenous 2 times per day    [Held by provider] enoxaparin  40 mg Subcutaneous Daily     Continuous Infusions:   sodium chloride      lactated ringers 100 mL/hr at 21 0456     PRN Meds:oxyCODONE, benzocaine-menthol, sodium chloride flush, sodium chloride, ondansetron **OR** ondansetron, HYDROmorphone **OR** HYDROmorphone    OBJECTIVE:  BP (!) 122/54   Pulse 100   Temp 99 °F (37.2 °C) (Oral)   Resp 16   Ht 5' 2\" (1.575 m)   Wt 263 lb 6.4 oz (119.5 kg)   LMP 2021   SpO2 99%   BMI 48.18 kg/m²   Temp  Av.2 °F (37.9 °C)  Min: 99 °F (37.2 °C)  Max: 100.9 °F (38.3 °C)  Constitutional: The patient is awake, alert, and oriented. In pain. Skin: Warm and diaphoretic. No rashes were noted. HEENT: Round and reactive pupils. Moist mucous membranes. No ulcerations or thrush. Neck: Supple to movements. Chest: No respiratory distress. Room air. Symmetrical expansion. Diminished bibasilar   Cardiovascular: S1 and S2 are rhythmic and regular. Abdomen: Positive bowel sounds to auscultation. Diffuse tenderness to palpation. Soft. Lap sites clean, closed with surgical glue. MARY ANN drain with serosang drainage - somewhat cloudy   Extremities: trace edema BLE.   Lines: peripheral    Laboratory and Tests Review:  Lab Results   Component Value Date    WBC 13.0 (H) 2021    WBC 13.2 (H) 2021    WBC 13.5 (H) 07/30/2021    HGB 9.7 (L) 08/01/2021    HCT 30.1 (L) 08/01/2021    MCV 86.0 08/01/2021     08/01/2021     Lab Results   Component Value Date    NEUTROABS 9.61 (H) 08/01/2021    NEUTROABS 10.00 (H) 07/31/2021    NEUTROABS 10.65 (H) 07/30/2021     No results found for: CRPHS  Lab Results   Component Value Date    ALT 26 07/28/2021    AST 20 07/28/2021    ALKPHOS 172 (H) 07/28/2021    BILITOT 1.1 07/28/2021     Lab Results   Component Value Date     08/01/2021    K 3.7 08/01/2021     08/01/2021    CO2 28 08/01/2021    BUN 10 08/01/2021    CREATININE 0.5 08/01/2021    CREATININE 0.5 07/31/2021    CREATININE 0.5 07/30/2021    GFRAA >60 08/01/2021    LABGLOM >60 08/01/2021    GLUCOSE 103 08/01/2021    PROT 7.8 07/28/2021    LABALBU 3.8 07/28/2021    CALCIUM 8.2 08/01/2021    BILITOT 1.1 07/28/2021    ALKPHOS 172 07/28/2021    AST 20 07/28/2021    ALT 26 07/28/2021     Lab Results   Component Value Date    CRP 12.1 (H) 07/28/2021     Lab Results   Component Value Date    SEDRATE 25 (H) 07/28/2021     Radiology:  No new studies     Microbiology:   7/28/2021- gram stain- body fluid- E.coli x 2 morphologies   No anaerobes   Blood Culture- negative so far  MRSA screen- not isolated  Fungitell - pending     ASSESSMENT:  · Acute appendicitis   · S/p attempted lap appendectomy with drain placement   · Leukocytosis associated to appendicitis, no change    · Fevers - low grade     PLAN:  · Continue Ertapenem for now  · Repeat CT of abdomen/pelvis today    · Monitor temps   · Fungitell pending   · Surgery following - drain in place  · Check final cultures   · Monitor labs- WBC 13 - no improvement. NANDA Moraes - CNP  9:56 AM  8/1/2021     Patient seen and examined. I had a face to face encounter with the patient. Agree with exam.  Assessment and plan as outlined above and directed by me. Addition and corrections were done as deemed appropriate. My exam and plan include:  The patient feels slightly better, however she is still having abdominal pain. The right lower quadrant is still very tender to the touch. The CAT scan was repeated. I spoke with radiology. There is a large phlegmon in the right lower quadrant where the appendix is and the drain was placed she is nowhere near that area of inflammation. I think the patient will need a surgical procedure such as an open laparotomy to fix this problem. Surgery is aware. Continue Ertapenem. Spoke with nursing.     Bola Lutz MD  8/1/2021  1:55 PM

## 2021-08-01 NOTE — PROGRESS NOTES
Continues to feel little bit better. Tolerating soft diet without problem. Still moving bowels. On exam abdomen soft nondistended incisions intact, drain serous. Continue to increase activity and increase oral intake.

## 2021-08-01 NOTE — PROGRESS NOTES
Spoke with Dr. Dee Dee Davis and Dr. Marie Hill about CT ABD results. Dr. Marie Hill said \"this isn't an emergency so we can address this tomorrow. \" No new orders at this time from sx. Dr. Dee Dee Davis notified of results and said he will be calling Dr. Marie Hill. No new orders from ID at this time. Will continue to monitor patient.  Electronically signed by Liban Mcmahon RN on 8/1/2021 at 12:51 PM

## 2021-08-02 ENCOUNTER — APPOINTMENT (OUTPATIENT)
Dept: CT IMAGING | Age: 26
DRG: 720 | End: 2021-08-02
Attending: SURGERY
Payer: COMMERCIAL

## 2021-08-02 LAB
(1,3)-BETA-D-GLUCAN (FUNGITELL) INTERPRETATION: NEGATIVE
(1,3)-BETA-D-GLUCAN (FUNGITELL): <31 PG/ML
ANION GAP SERPL CALCULATED.3IONS-SCNC: 7 MMOL/L (ref 7–16)
APTT: 26.1 SEC (ref 24.5–35.1)
BASOPHILS ABSOLUTE: 0 E9/L (ref 0–0.2)
BASOPHILS RELATIVE PERCENT: 0 % (ref 0–2)
BUN BLDV-MCNC: 9 MG/DL (ref 6–20)
CALCIUM SERPL-MCNC: 8.2 MG/DL (ref 8.6–10.2)
CHLORIDE BLD-SCNC: 99 MMOL/L (ref 98–107)
CO2: 27 MMOL/L (ref 22–29)
CREAT SERPL-MCNC: 0.5 MG/DL (ref 0.5–1)
EOSINOPHILS ABSOLUTE: 0.26 E9/L (ref 0.05–0.5)
EOSINOPHILS RELATIVE PERCENT: 1.8 % (ref 0–6)
GFR AFRICAN AMERICAN: >60
GFR NON-AFRICAN AMERICAN: >60 ML/MIN/1.73
GLUCOSE BLD-MCNC: 100 MG/DL (ref 74–99)
HCT VFR BLD CALC: 31 % (ref 34–48)
HEMOGLOBIN: 9.9 G/DL (ref 11.5–15.5)
INR BLD: 1.3
LYMPHOCYTES ABSOLUTE: 2.16 E9/L (ref 1.5–4)
LYMPHOCYTES RELATIVE PERCENT: 14.9 % (ref 20–42)
MCH RBC QN AUTO: 27.7 PG (ref 26–35)
MCHC RBC AUTO-ENTMCNC: 31.9 % (ref 32–34.5)
MCV RBC AUTO: 86.8 FL (ref 80–99.9)
MONOCYTES ABSOLUTE: 0.58 E9/L (ref 0.1–0.95)
MONOCYTES RELATIVE PERCENT: 3.5 % (ref 2–12)
NEUTROPHILS ABSOLUTE: 11.52 E9/L (ref 1.8–7.3)
NEUTROPHILS RELATIVE PERCENT: 79.8 % (ref 43–80)
NUCLEATED RED BLOOD CELLS: 0 /100 WBC
PDW BLD-RTO: 13.2 FL (ref 11.5–15)
PLATELET # BLD: 236 E9/L (ref 130–450)
PMV BLD AUTO: 11.2 FL (ref 7–12)
POLYCHROMASIA: ABNORMAL
POTASSIUM REFLEX MAGNESIUM: 3.7 MMOL/L (ref 3.5–5)
PROTHROMBIN TIME: 14.5 SEC (ref 9.3–12.4)
RBC # BLD: 3.57 E12/L (ref 3.5–5.5)
SODIUM BLD-SCNC: 133 MMOL/L (ref 132–146)
WBC # BLD: 14.4 E9/L (ref 4.5–11.5)

## 2021-08-02 PROCEDURE — 6370000000 HC RX 637 (ALT 250 FOR IP): Performed by: STUDENT IN AN ORGANIZED HEALTH CARE EDUCATION/TRAINING PROGRAM

## 2021-08-02 PROCEDURE — 87186 SC STD MICRODIL/AGAR DIL: CPT

## 2021-08-02 PROCEDURE — 2709999900 CT ABSCESS DRAINAGE W CATH PLACEMENT S&I

## 2021-08-02 PROCEDURE — 80048 BASIC METABOLIC PNL TOTAL CA: CPT

## 2021-08-02 PROCEDURE — 36415 COLL VENOUS BLD VENIPUNCTURE: CPT

## 2021-08-02 PROCEDURE — 2500000003 HC RX 250 WO HCPCS: Performed by: STUDENT IN AN ORGANIZED HEALTH CARE EDUCATION/TRAINING PROGRAM

## 2021-08-02 PROCEDURE — 87075 CULTR BACTERIA EXCEPT BLOOD: CPT

## 2021-08-02 PROCEDURE — 99232 SBSQ HOSP IP/OBS MODERATE 35: CPT | Performed by: INTERNAL MEDICINE

## 2021-08-02 PROCEDURE — 6360000002 HC RX W HCPCS: Performed by: STUDENT IN AN ORGANIZED HEALTH CARE EDUCATION/TRAINING PROGRAM

## 2021-08-02 PROCEDURE — 6360000002 HC RX W HCPCS: Performed by: SPECIALIST

## 2021-08-02 PROCEDURE — 87077 CULTURE AEROBIC IDENTIFY: CPT

## 2021-08-02 PROCEDURE — 6370000000 HC RX 637 (ALT 250 FOR IP): Performed by: INTERNAL MEDICINE

## 2021-08-02 PROCEDURE — 85025 COMPLETE CBC W/AUTO DIFF WBC: CPT

## 2021-08-02 PROCEDURE — 87205 SMEAR GRAM STAIN: CPT

## 2021-08-02 PROCEDURE — 1200000000 HC SEMI PRIVATE

## 2021-08-02 PROCEDURE — 2580000003 HC RX 258: Performed by: CLINICAL NURSE SPECIALIST

## 2021-08-02 PROCEDURE — 85610 PROTHROMBIN TIME: CPT

## 2021-08-02 PROCEDURE — 85730 THROMBOPLASTIN TIME PARTIAL: CPT

## 2021-08-02 PROCEDURE — 6360000002 HC RX W HCPCS: Performed by: CLINICAL NURSE SPECIALIST

## 2021-08-02 PROCEDURE — 99231 SBSQ HOSP IP/OBS SF/LOW 25: CPT | Performed by: INTERNAL MEDICINE

## 2021-08-02 PROCEDURE — 87070 CULTURE OTHR SPECIMN AEROBIC: CPT

## 2021-08-02 RX ORDER — MIDAZOLAM HYDROCHLORIDE 2 MG/2ML
1 INJECTION, SOLUTION INTRAMUSCULAR; INTRAVENOUS ONCE
Status: COMPLETED | OUTPATIENT
Start: 2021-08-02 | End: 2021-08-02

## 2021-08-02 RX ORDER — SODIUM CHLORIDE 0.9 % (FLUSH) 0.9 %
5-40 SYRINGE (ML) INJECTION PRN
Status: DISCONTINUED | OUTPATIENT
Start: 2021-08-02 | End: 2021-08-05 | Stop reason: HOSPADM

## 2021-08-02 RX ORDER — FLUCONAZOLE 2 MG/ML
400 INJECTION, SOLUTION INTRAVENOUS EVERY 24 HOURS
Status: DISCONTINUED | OUTPATIENT
Start: 2021-08-02 | End: 2021-08-04

## 2021-08-02 RX ORDER — SODIUM CHLORIDE 9 MG/ML
25 INJECTION, SOLUTION INTRAVENOUS PRN
Status: DISCONTINUED | OUTPATIENT
Start: 2021-08-02 | End: 2021-08-05 | Stop reason: HOSPADM

## 2021-08-02 RX ORDER — LIDOCAINE HYDROCHLORIDE 20 MG/ML
15 INJECTION, SOLUTION INFILTRATION; PERINEURAL ONCE
Status: COMPLETED | OUTPATIENT
Start: 2021-08-02 | End: 2021-08-02

## 2021-08-02 RX ORDER — FENTANYL CITRATE 50 UG/ML
50 INJECTION, SOLUTION INTRAMUSCULAR; INTRAVENOUS ONCE
Status: COMPLETED | OUTPATIENT
Start: 2021-08-02 | End: 2021-08-02

## 2021-08-02 RX ADMIN — ACETAMINOPHEN 650 MG: 325 TABLET ORAL at 22:21

## 2021-08-02 RX ADMIN — ERTAPENEM SODIUM 1000 MG: 1 INJECTION, POWDER, LYOPHILIZED, FOR SOLUTION INTRAMUSCULAR; INTRAVENOUS at 11:07

## 2021-08-02 RX ADMIN — FLUCONAZOLE 400 MG: 2 INJECTION, SOLUTION INTRAVENOUS at 17:47

## 2021-08-02 RX ADMIN — HYDROMORPHONE HYDROCHLORIDE 1 MG: 1 INJECTION, SOLUTION INTRAMUSCULAR; INTRAVENOUS; SUBCUTANEOUS at 14:07

## 2021-08-02 RX ADMIN — LIDOCAINE HYDROCHLORIDE 15 ML: 20 INJECTION, SOLUTION INFILTRATION; PERINEURAL at 16:53

## 2021-08-02 RX ADMIN — OXYCODONE 5 MG: 5 TABLET ORAL at 01:24

## 2021-08-02 RX ADMIN — FENTANYL CITRATE 50 MCG: 50 INJECTION, SOLUTION INTRAMUSCULAR; INTRAVENOUS at 16:33

## 2021-08-02 RX ADMIN — HYDROMORPHONE HYDROCHLORIDE 1 MG: 1 INJECTION, SOLUTION INTRAMUSCULAR; INTRAVENOUS; SUBCUTANEOUS at 08:05

## 2021-08-02 RX ADMIN — METHIMAZOLE 20 MG: 10 TABLET ORAL at 22:21

## 2021-08-02 RX ADMIN — FENTANYL CITRATE 50 MCG: 50 INJECTION, SOLUTION INTRAMUSCULAR; INTRAVENOUS at 16:52

## 2021-08-02 RX ADMIN — METHIMAZOLE 20 MG: 10 TABLET ORAL at 08:05

## 2021-08-02 RX ADMIN — MIDAZOLAM HYDROCHLORIDE 1 MG: 1 INJECTION, SOLUTION INTRAMUSCULAR; INTRAVENOUS at 16:33

## 2021-08-02 RX ADMIN — HYDROMORPHONE HYDROCHLORIDE 1 MG: 1 INJECTION, SOLUTION INTRAMUSCULAR; INTRAVENOUS; SUBCUTANEOUS at 21:40

## 2021-08-02 RX ADMIN — PROPRANOLOL HYDROCHLORIDE 20 MG: 20 TABLET ORAL at 22:21

## 2021-08-02 RX ADMIN — HYDROMORPHONE HYDROCHLORIDE 0.5 MG: 1 INJECTION, SOLUTION INTRAMUSCULAR; INTRAVENOUS; SUBCUTANEOUS at 04:56

## 2021-08-02 RX ADMIN — PROPRANOLOL HYDROCHLORIDE 20 MG: 20 TABLET ORAL at 08:05

## 2021-08-02 RX ADMIN — ACETAMINOPHEN 650 MG: 325 TABLET ORAL at 08:04

## 2021-08-02 RX ADMIN — ACETAMINOPHEN 650 MG: 325 TABLET ORAL at 03:24

## 2021-08-02 RX ADMIN — ACETAMINOPHEN 650 MG: 325 TABLET ORAL at 17:55

## 2021-08-02 RX ADMIN — MIDAZOLAM HYDROCHLORIDE 1 MG: 1 INJECTION, SOLUTION INTRAMUSCULAR; INTRAVENOUS at 16:51

## 2021-08-02 RX ADMIN — HYDROMORPHONE HYDROCHLORIDE 1 MG: 1 INJECTION, SOLUTION INTRAMUSCULAR; INTRAVENOUS; SUBCUTANEOUS at 11:07

## 2021-08-02 RX ADMIN — HYDROMORPHONE HYDROCHLORIDE 1 MG: 1 INJECTION, SOLUTION INTRAMUSCULAR; INTRAVENOUS; SUBCUTANEOUS at 17:55

## 2021-08-02 ASSESSMENT — PAIN DESCRIPTION - LOCATION
LOCATION: ABDOMEN

## 2021-08-02 ASSESSMENT — PAIN SCALES - GENERAL
PAINLEVEL_OUTOF10: 10
PAINLEVEL_OUTOF10: 10
PAINLEVEL_OUTOF10: 9
PAINLEVEL_OUTOF10: 10
PAINLEVEL_OUTOF10: 8
PAINLEVEL_OUTOF10: 10
PAINLEVEL_OUTOF10: 10
PAINLEVEL_OUTOF10: 9
PAINLEVEL_OUTOF10: 10
PAINLEVEL_OUTOF10: 9
PAINLEVEL_OUTOF10: 7
PAINLEVEL_OUTOF10: 10

## 2021-08-02 ASSESSMENT — PAIN DESCRIPTION - PAIN TYPE
TYPE: ACUTE PAIN

## 2021-08-02 ASSESSMENT — PAIN DESCRIPTION - ORIENTATION
ORIENTATION: RIGHT;LOWER
ORIENTATION: RIGHT

## 2021-08-02 NOTE — PROGRESS NOTES
[address] [place] [address] [facility]  [facility]  Progress Note    SUBJECTIVE:  CC: abdominal pain     Patient is tolerating medications. No nausea, vomiting, diarrhea. Right sided abdominal pain continues, ice packs on abdomen. tmax 99.1     Review of systems:  As stated above in the chief complaint, otherwise negative. Medications:  Scheduled Meds:   ertapenem (INVanz) IVPB  1,000 mg Intravenous Q24H    propranolol  20 mg Oral TID    methIMAzole  20 mg Oral TID    acetaminophen  650 mg Oral Q6H    sodium chloride flush  5-40 mL Intravenous 2 times per day    [Held by provider] enoxaparin  40 mg Subcutaneous Daily     Continuous Infusions:   sodium chloride      lactated ringers 100 mL/hr at 21 2335     PRN Meds:oxyCODONE, benzocaine-menthol, sodium chloride flush, sodium chloride, ondansetron **OR** ondansetron, HYDROmorphone **OR** HYDROmorphone    OBJECTIVE:  BP (!) 122/59   Pulse 106   Temp 99.1 °F (37.3 °C) (Oral)   Resp 18   Ht 5' 2\" (1.575 m)   Wt 263 lb 6.4 oz (119.5 kg)   LMP 2021   SpO2 95%   BMI 48.18 kg/m²   Temp  Av.6 °F (37 °C)  Min: 98.1 °F (36.7 °C)  Max: 99.1 °F (37.3 °C)  Constitutional: The patient is awake, alert, and oriented. In pain. Visitor present. Skin: Warm and dry. No rashes were noted. HEENT: Round and reactive pupils. Moist mucous membranes. No ulcerations or thrush. Neck: Supple to movements. Chest: No respiratory distress. Room air. Symmetrical expansion. Diminished bibasilar   Cardiovascular: S1 and S2 are rhythmic and regular. Abdomen: Positive bowel sounds to auscultation. Diffuse tenderness to palpation, especially on the right. Soft. Lap sites clean, closed with surgical glue. MARY ANN drain with serous drainage   Extremities: trace edema BLE.   Lines: peripheral    Laboratory and Tests Review:  Lab Results   Component Value Date    WBC 14.4 (H) 2021    WBC 13.0 (H) 2021    WBC 13.2 (H) 2021    HGB 9.9 (L) 08/02/2021    HCT 31.0 (L) 08/02/2021    MCV 86.8 08/02/2021     08/02/2021     Lab Results   Component Value Date    NEUTROABS 11.52 (H) 08/02/2021    NEUTROABS 9.61 (H) 08/01/2021    NEUTROABS 10.00 (H) 07/31/2021     No results found for: CRPHS  Lab Results   Component Value Date    ALT 26 07/28/2021    AST 20 07/28/2021    ALKPHOS 172 (H) 07/28/2021    BILITOT 1.1 07/28/2021     Lab Results   Component Value Date     08/02/2021    K 3.7 08/02/2021    CL 99 08/02/2021    CO2 27 08/02/2021    BUN 9 08/02/2021    CREATININE 0.5 08/02/2021    CREATININE 0.5 08/01/2021    CREATININE 0.5 07/31/2021    GFRAA >60 08/02/2021    LABGLOM >60 08/02/2021    GLUCOSE 100 08/02/2021    PROT 7.8 07/28/2021    LABALBU 3.8 07/28/2021    CALCIUM 8.2 08/02/2021    BILITOT 1.1 07/28/2021    ALKPHOS 172 07/28/2021    AST 20 07/28/2021    ALT 26 07/28/2021     Lab Results   Component Value Date    CRP 12.1 (H) 07/28/2021     Lab Results   Component Value Date    SEDRATE 25 (H) 07/28/2021     Radiology:  CT scan:  1.  After recent the laparoscopic procedure to the right lower quadrant.       2.  Significant inflammatory process in the reaction is present the around   the cecum extending into the retrocecal area dissecting through   retroperitoneal spaces and into adjacent omental/mesentery fat planes along   the medial aspect of the ascending colon and the around and inferior to the   cecal area with amorphous fluid accumulation in the retrocecal/infra cecal   area covering an area of a 5.4 x 3.9 x 7 cm.       3.  The extensive a reactive a retroperitoneal and mesenteric adenopathy seen   around the and posterior to the cecum.  There is also a it evidence for edema   of the cecum and of the terminal ileum.       4.  In the retrocecal area there is a amorphous calcification which is   similar as observed in the pre operative study in the region of the appendix   which is relate with a fusiform shaped structure which is ill-defined which   connects with cecum, these represent segments of the appendix if not the   entirely appendix, with areas indicative of appendiceal perforation.       5.  The right surgical catheter is looping inferiorly to the cecum. Microbiology:   7/28/2021- gram stain- body fluid- E.coli x 2 morphologies   No anaerobes   Blood Culture- negative so far  MRSA screen- not isolated  Fungitell - pending     ASSESSMENT:  · Acute appendicitis   · S/p attempted lap appendectomy with drain placement   · Leukocytosis associated to appendicitis, no change    · Fevers - low grade, improving      PLAN:  · Continue Ertapenem for now    · Surgery following - await plans   · Monitor labs- WBC 14.4 today   · CT scan reviewed     Cristiano Haji, NANDA - CNP  9:47 AM  8/2/2021     Patient seen and examined. I had a face to face encounter with the patient. Agree with exam.  Assessment and plan as outlined above and directed by me. Addition and corrections were done as deemed appropriate. My exam and plan include: Patient is lying down. She is looking miserable. She is still in significant amount of pain, especially right lower quadrant. Constantly lying down with a pillow on her abdomen. Apologetic, however, about being in pain. Continue Ertapenem. We think she will ultimately require a surgical intervention. We will add Fluconazole for possible yeast infection.     Shahana Long MD  8/2/2021  3:26 PM

## 2021-08-02 NOTE — PRE SEDATION
Sedation Pre-Procedure Note    Patient Name: Areli Zarco   YOB: 1995  Room/Bed: [facility]/0706-A  Medical Record Number: [id number]  Date: 8/2/2021   Time: 4:24 PM       Indication:  CT guided needle aspiration of RLQ fluid, possible drain insert    Consent: I have discussed with the patient and/or the patient representative the indication, alternatives, and the possible risks and/or complications of the planned procedure and the anesthesia methods. The patient and/or patient representative appear to understand and agree to proceed. Vital Signs:   Vitals:    08/02/21 1400   BP: (!) 110/55   Pulse: 90   Resp: 16   Temp:    SpO2: 95%       Past Medical History:   has a past medical history of Hyperthyroidism, Seizures ([address] and Trauma. Past Surgical History:   has a past surgical history that includes laryngoscopy; Foot surgery; and laparoscopic appendectomy (N/A, 7/28/2021). Medications:   Scheduled Meds:    fluconazole  400 mg Intravenous Q24H    lidocaine  15 mL Intradermal Once    ertapenem (INVanz) IVPB  1,000 mg Intravenous Q24H    propranolol  20 mg Oral TID    methIMAzole  20 mg Oral TID    acetaminophen  650 mg Oral Q6H    sodium chloride flush  5-40 mL Intravenous 2 times per day    [Held by provider] enoxaparin  40 mg Subcutaneous Daily     Continuous Infusions:    sodium chloride      sodium chloride      lactated ringers 100 mL/hr at 08/01/21 2335     PRN Meds: sodium chloride flush, sodium chloride, oxyCODONE, benzocaine-menthol, sodium chloride flush, sodium chloride, ondansetron **OR** ondansetron, HYDROmorphone **OR** HYDROmorphone  Home Meds:   Prior to Admission medications    Not on File     Coumadin Use Last 7 Days:  no  Antiplatelet drug therapy use last 7 days: no  Other anticoagulant use last 7 days: no  Additional Medication Information:  none      Pre-Sedation Documentation and Exam:   Vital signs have been reviewed (see flow sheet for vitals).     Mallampati Airway Assessment:  Mallampati Class I - (soft palate, fauces, uvula & anterior/posterior tonsillar pillars are visible)    Prior History of Anesthesia Complications:   none    ASA Classification:  Class 1 - A normal healthy patient    Sedation/ Anesthesia Plan:   intravenous sedation    Medications Planned:   midazolam (Versed) intravenously and fentanyl intravenously    Patient is an appropriate candidate for plan of sedation: yes    Electronically signed by Dalton Brantley MD on 8/2/2021 at 4:24 PM

## 2021-08-02 NOTE — PROGRESS NOTES
Notified Dr. Scottie Mcdermott and gen surg team of US thyroid results. Paged Dr. Donny Fletcher to notify of results, awaiting call back.

## 2021-08-02 NOTE — PROGRESS NOTES
Brief post IR note    Diagnosis: perf appendicitis    Procedure: CT guided abscess drain insert    Findings:         4cm RLQ abscess.  Insertion of 10Fr Drain, 95cc purulent fluid decompressed    Specimen: 90cc purulent fluid    EBL: min    Complication: none    Plan:   -cont abx  -monitor daily output  -flush drain w/ 10cc NS qday      Syed Shi MD  Vascular and Interventional Radiology ([facility])    Daytime direct number to [address]: [address]:      [facility] Core: [phone]  Outpatient IR [phone]

## 2021-08-02 NOTE — PROGRESS NOTES
General Surgery  Attending Physician Progress Note    No chief complaint on file. Subjective: The patient said she has more pain today. She is having BM. No nausea or vomiting. Afebrile for the past 24 hours. CT showed abscess. I reviewed the patient's Past Medical History, Past Surgical History, Medications, and Allergies. LABS:  CBC:   Lab Results   Component Value Date    WBC 14.4 08/02/2021    RBC 3.57 08/02/2021    HGB 9.9 08/02/2021    HCT 31.0 08/02/2021    MCV 86.8 08/02/2021    MCH 27.7 08/02/2021    MCHC 31.9 08/02/2021    RDW 13.2 08/02/2021     08/02/2021    MPV 11.2 08/02/2021     CMP:    Lab Results   Component Value Date     08/02/2021    K 3.7 08/02/2021    CL 99 08/02/2021    CO2 27 08/02/2021    BUN 9 08/02/2021    CREATININE 0.5 08/02/2021    GFRAA >60 08/02/2021    LABGLOM >60 08/02/2021    GLUCOSE 100 08/02/2021    PROT 7.8 07/28/2021    LABALBU 3.8 07/28/2021    CALCIUM 8.2 08/02/2021    BILITOT 1.1 07/28/2021    ALKPHOS 172 07/28/2021    AST 20 07/28/2021    ALT 26 07/28/2021     BMP:    Lab Results   Component Value Date     08/02/2021    K 3.7 08/02/2021    CL 99 08/02/2021    CO2 27 08/02/2021    BUN 9 08/02/2021    LABALBU 3.8 07/28/2021    CREATININE 0.5 08/02/2021    CALCIUM 8.2 08/02/2021    GFRAA >60 08/02/2021    LABGLOM >60 08/02/2021     Hepatic Function Panel:    Lab Results   Component Value Date    ALKPHOS 172 07/28/2021    ALT 26 07/28/2021    AST 20 07/28/2021    PROT 7.8 07/28/2021    BILITOT 1.1 07/28/2021    LABALBU 3.8 07/28/2021       Physical Exam:  Vitals:    08/02/21 1715   BP: 130/64   Pulse: 109   Resp: 28   Temp:    SpO2: 96%       General appearance: alert, cooperative and in no acute distress. Lungs: normal work of breathing  Heart: regular rate  Abdomen: soft, right sided tenderness  Extremities: symmetrical without edema.     Impression/Plan:      Assessment: Rinku Hurst is an 22 y.o. female who presents with perforated appendicitis with intraabdominal abscess    Plan:     Consult IR for drain    No need for reoperation at this time. That is aggressive at this juncture given we have the option of IR drain placement first. I explained to the patient that I am the surgeon on this case and therefore I am the specialist who decides whether or not she needs reoperation, not other consultants who have different areas of expertise. If she does not improve with drain placement, we could consider surgical options at that time.      Electronically by Damir Desouza MD, FACS, General Surgery  on 8/2/2021 at 5:22 PM

## 2021-08-02 NOTE — PROGRESS NOTES
[facility]  Hospitalist   Progress Note    Admitting Date and Time: 7/28/2021  5:03 AM  Admit Dx: Acute appendicitis [K35.80]     Seen for follow on multiple problems as listed below. Subjective:  Has increasing rt sided abdominal pain, with fever , nausea and diaphoresis. No vomiting or diarrhea. No CP or sob. CT with worsening inflammation rt abdomen. ID and surg aware. ROS: denies fever, chills, cp, sob, n/v, HA unless stated above.      ertapenem (INVanz) IVPB  1,000 mg Intravenous Q24H    propranolol  20 mg Oral TID    methIMAzole  20 mg Oral TID    acetaminophen  650 mg Oral Q6H    sodium chloride flush  5-40 mL Intravenous 2 times per day    [Held by provider] enoxaparin  40 mg Subcutaneous Daily     oxyCODONE, 5 mg, Q4H PRN  benzocaine-menthol, 1 lozenge, Q2H PRN  sodium chloride flush, 5-40 mL, PRN  sodium chloride, 25 mL, PRN  ondansetron, 4 mg, Q8H PRN   Or  ondansetron, 4 mg, Q6H PRN  HYDROmorphone, 0.5 mg, Q3H PRN   Or  HYDROmorphone, 1 mg, Q3H PRN         Objective:    BP (!) 122/59   Pulse 106   Temp 99.1 °F (37.3 °C) (Oral)   Resp 18   Ht 5' 2\" (1.575 m)   Wt 263 lb 6.4 oz (119.5 kg)   LMP 07/22/2021   SpO2 95%   BMI 48.18 kg/m²   General Appearance: alert and oriented to person, place and time, in no acute distress  Skin: warm and dry  Head: normocephalic and atraumatic  Eyes: pupils equal, round, and reactive to light, extraocular eye movements intact, conjunctivae normal  Neck: supple and non-tender without mass  Pulmonary/Chest: clear to auscultation bilaterally  Cardiovascular:Improving ST , regular rhythm, normal S1 and S2  Abdomen: soft, increased  Diffuse tenderness rt > lt , drain right lower quadrant, non-distended, normal bowel sounds, no masses or organomegaly  Extremities: no cyanosis, clubbing   Musculoskeletal: normal range of motion  Neurologic:  no cranial nerve deficit,  speech normal      Recent Labs     07/31/21  0610 08/01/21  0425 08/02/21  0348  135 133   K 3.3* 3.7 3.7   CL 99 102 99   CO2 25 28 27   BUN 11 10 9   CREATININE 0.5 0.5 0.5   GLUCOSE 105* 103* 100*   CALCIUM 8.3* 8.2* 8.2*       Recent Labs     07/31/21  0610 08/01/21  0425 08/02/21  0348   WBC 13.2* 13.0* 14.4*   RBC 4.00 3.50 3.57   HGB 11.2* 9.7* 9.9*   HCT 34.6 30.1* 31.0*   MCV 86.5 86.0 86.8   MCH 28.0 27.7 27.7   MCHC 32.4 32.2 31.9*   RDW 13.0 13.2 13.2    224 236   MPV 11.0 11.1 11.2       Labs and images reviewed     Radiology:   US THYROID   Final Result   Right thyroid lobe dominant mass measuring up to 6 cm which is increased in   size compared to earlier exam and occupies the majority of the right thyroid   lobe. This represents a TR3 mass and FNA biopsy is recommended if not   already performed. ACR TI-RADS recommendations:      TR5 (>= 7 points):  FNA if >= 1 cm; follow-up if 0.5-0.9 cm in 1, 2, 3, 4,   and 5 years      TR4 (4-6 points):  FNA if >= 1.5 cm; follow-up if 1.0-1.4 cm in 1, 2, 3, and   5 years      TR3 (3 points):  FNA if >= 2.5 cm; follow-up if 1.5-2.4 cm in 1, 3, and 5   years      TR2 (2 points):  No FNA or follow-up      TR1 (0 points):  No FNA or follow-up      The findings were sent to the Radiology Results [address] at 8:10   am on 8/2/2021to be communicated to a licensed caregiver. RECOMMENDATIONS:   Right thyroid lobe FNA biopsy. CT ABDOMEN PELVIS W IV CONTRAST Additional Contrast? None   Final Result   1. After recent the laparoscopic procedure to the right lower quadrant. 2.  Significant inflammatory process in the reaction is present the around   the cecum extending into the retrocecal area dissecting through   retroperitoneal spaces and into adjacent omental/mesentery fat planes along   the medial aspect of the ascending colon and the around and inferior to the   cecal area with amorphous fluid accumulation in the retrocecal/infra cecal   area covering an area of a 5.4 x 3.9 x 7 cm.       3.  The extensive a reactive a retroperitoneal and mesenteric adenopathy seen   around the and posterior to the cecum. There is also a it evidence for edema   of the cecum and of the terminal ileum. 4.  In the retrocecal area there is a amorphous calcification which is   similar as observed in the pre operative study in the region of the appendix   which is relate with a fusiform shaped structure which is ill-defined which   connects with cecum, these represent segments of the appendix if not the   entirely appendix, with areas indicative of appendiceal perforation. 5.  The right surgical catheter is looping inferiorly to the cecum. Assessment:    Principal Problem:    Acute appendicitis  Active Problems:    Sinus tachycardia  Resolved Problems:    * No resolved hospital problems. *      Plan:  Persistent sinus tachycardia-multifactorial.  Likely secondary to untreated hyperthyroidism, pain, fever and ongoing inflammation with perforated appendicitis. Will treat with Inderal with parameters to hold. Still tachycardic but better as above . Monitor.     Untreated hyperthyroidism-Dr Becerril consulted & d/w him. Repeat TSH low , T4 elevated  thyroid-stimulating immunoglobulin, thyroid peroxidase antibody and antithyroid antibody pending ,  Dr. Heri Nesbitt following. Will need outpatient follow-up. Has hot nodule in the right thyroid as per patient. Reviewed endo notes. Started on Methimazole. Thyroid us with dominant mass 6 cm increased c/f earlier. needs FNA bx. Endo/ GS following & aware. Perforated appendicitis s/p aborted laparoscopic appendectomy, with abdominal washout and drain placement. Continue antibiotics as per ID recommendation and postop care as per surgery. Was on IV ceftriaxone plus Flagyl followed by IV Unasyn, now on IV ertapenem. As above CT with worsening might need expl lap.  ID & surg aware.            Electronically signed by West Hernandez MD on 8/2/2021 at 9:05 AM

## 2021-08-02 NOTE — PROGRESS NOTES
IRFLOWSHEET    Date: 8/2/2021    Time: 4:32 PM     Exam: image guided insertion of abdominal abscess drain    Radiologist Performing Procedure: Dr Roland Ventura    Nurse Reporting/Phone: [phone]     Nurse Receiving: Chris Barbosa RN    Permit signed:      Yes    ID Band Checklist: Yes    Allergies: Patient has no known allergies. TIME OUT: 1651    PROCEDURE START TIME: 1650    Puncture Site: RLQ    Puncture Time: 9505    Catheters: chiba 18 gauge x 15cm                      10 Amharic  LABS:   Lab Results   Component Value Date    INR 1.3 08/02/2021    PROTIME 14.5 (H) 08/02/2021           Lab Results   Component Value Date    CREATININE 0.5 08/02/2021    BUN 9 08/02/2021          Lab Results   Component Value Date    HGB 9.9 (L) 08/02/2021    HCT 31.0 (L) 08/02/2021     08/02/2021         PROCEDURE END TIME: 1710    Total Contrast: na    Fluoroscopy Time: na    Complications:  None    Comments: Patient taken to Cat Scan, positioned on table supine with O2 and monitoring equipment attached. Dr Roland Ventura into speak with patient consent signed. Transferred to cat scan cart crying in pain across abdomen a 10. Sedation for comfort given. Patient scanned and images reviewed by Dr Roland Ventura . Patient prepped and draped per protocol. 10 Korean tube placed RLQ with Cat Scan guidance by Dr Roland Ventura @ 6272, specimen obtained and sent to lab for culture and gram stain 95cc of tan purulent drainage noted. Puncture site cleansed, mfix  applied to secure tube and tube connected to drain bag. Patient tolerated well.  Procedure completed @ 2948, patient taken back to room, report called to RN

## 2021-08-02 NOTE — PROGRESS NOTES
ENDOCRINOLOGY PROGRESS NOTE    Date of Admission: 7/28/2021  Date of Service: 8/2/2021  Admitting Physician: Jose Smiley MD   Primary Care Physician: Kiara Simpson DO  Consultant physician: Divina Vanessa MD     Reason for the consultation:  Hyperthyroidism     History of Present Illness: The history is provided by the patient. Accuracy of the patient data is excellent. Felipe Montilla is a very pleasant 22 y.o. old female with PMH obesity, hyperthyroidism admitted to [facility] on 7/28/2021 because of nausea vomiting and abdominal pain and found to have acute appendicitis, endocrine service was consulted for management of hyperthyroidism    Subjective:   Seen and examined this AM, still c/o abdominal pain,      Scheduled Meds:   fluconazole  400 mg Intravenous Q24H    ertapenem (INVanz) IVPB  1,000 mg Intravenous Q24H    propranolol  20 mg Oral TID    methIMAzole  20 mg Oral TID    acetaminophen  650 mg Oral Q6H    sodium chloride flush  5-40 mL Intravenous 2 times per day    [Held by provider] enoxaparin  40 mg Subcutaneous Daily     PRN Meds:   sodium chloride flush, 5-40 mL, PRN  sodium chloride, 25 mL, PRN  oxyCODONE, 5 mg, Q4H PRN  benzocaine-menthol, 1 lozenge, Q2H PRN  sodium chloride flush, 5-40 mL, PRN  sodium chloride, 25 mL, PRN  ondansetron, 4 mg, Q8H PRN   Or  ondansetron, 4 mg, Q6H PRN  HYDROmorphone, 0.5 mg, Q3H PRN   Or  HYDROmorphone, 1 mg, Q3H PRN      Continuous Infusions:   sodium chloride      sodium chloride      lactated ringers 100 mL/hr at 08/01/21 2335       Review of Systems  All systems reviewed.  All negative except for symptoms mentioned in HPI     OBJECTIVE    /60   Pulse 106   Temp 99.3 °F (37.4 °C) (Oral)   Resp 28   Ht 5' 2\" (1.575 m)   Wt 263 lb 6.4 oz (119.5 kg)   LMP 07/22/2021   SpO2 96%   BMI 48.18 kg/m²   Wt Readings from Last 6 Encounters:   07/28/21 263 lb 6.4 oz (119.5 kg)   07/27/21 225 lb (102.1 kg)   07/27/21 225 lb (102.1 kg) 04/27/21 225 lb (102.1 kg)   12/16/20 212 lb (96.2 kg)   10/03/20 212 lb (96.2 kg)       Physical examination:  General: awake alert, oriented x3, no abnormal position or movements. HEENT: normocephalic non traumatic, no exophthalmos, no lid lag, no lid retraction   Neck: supple, no LN enlargement, + thyromegaly, Large Rt side nodule palpable, no thyroid tenderness, no thyroid bruit   Pulm: Clear equal air entry no added sounds, no wheezing or rhonchi    CVS: S1 + S2, no murmur, no heave. Dorsalis pedis pulse palpable   Abd: abdominal pain, s/p surgery   Skin: warm, no lesions, no rash.     Musculoskeletal: No back tenderness, no kyphosis/scoliosis    Neuro: CN intact, sensation notmal , muscle power normal. + tremors   Psych: normal mood, and affect    Review of Laboratory Data:  I personally reviewed the following labs:  Recent Labs     07/31/21  0610 08/01/21  0425 08/02/21  0348   WBC 13.2* 13.0* 14.4*   RBC 4.00 3.50 3.57   HGB 11.2* 9.7* 9.9*   HCT 34.6 30.1* 31.0*   MCV 86.5 86.0 86.8   MCH 28.0 27.7 27.7   MCHC 32.4 32.2 31.9*   RDW 13.0 13.2 13.2    224 236   MPV 11.0 11.1 11.2     Recent Labs     07/31/21  0610 08/01/21  0425 08/02/21  0348    135 133   K 3.3* 3.7 3.7   CL 99 102 99   CO2 25 28 27   BUN 11 10 9   CREATININE 0.5 0.5 0.5   GLUCOSE 105* 103* 100*   CALCIUM 8.3* 8.2* 8.2*     No results found for: BHYDRXBUT  Lab Results   Component Value Date    LABA1C 5.3 07/15/2019     Lab Results   Component Value Date/Time    TSH <0.010 (L) 07/30/2021 04:20 PM    T4FREE 1.66 08/01/2021 04:25 AM    I4FVBTS 10.7 07/30/2021 04:20 PM    FT3 16.0 (H) 02/27/2020 02:17 AM    FT3 3.9 11/01/2017 12:35 PM    Y0UNVSD 383.30 (H) 06/05/2019 01:50 PM    TSI 96 06/05/2019 01:50 PM    TPOABS 0.8 06/05/2019 01:50 PM    THGAB <0.9 06/05/2019 01:50 PM     Lab Results   Component Value Date    LABA1C 5.3 07/15/2019    GLUCOSE 100 08/02/2021     No results found for: TRIG, HDL, LDLCALC, CHOL    Blood culture Lab Results   Component Value Date    BC 24 Hours no growth 07/29/2021    BC 5 Days- no growth 02/16/2019     Medical Records/Labs/Images Review:   I personally reviewed and summarized previous records   All labs and imaging were reviewed independently    Thyroid US 8/1 --> large complex nodule in the Rt lobe measuring 6 cm       ASSESSMENT & RECOMMENDATIONS   Rolando Oliver, a 22 y.o.-old female seen in for management of Hyperthyroidism     Hyperthyroidism  · Due to toxic multinodular goiter (TSI, TPO-Ab, Tg-Ab are negative)   · On Methimazole 20 mg TID    · continue Propranolol 20 mg TID  · Daily free T4 while inpatient      Multinodular goiter   · US 8/1/2021 --> large 6 cm complex nodule in the Rt thyroid lobe   · Pt will need thyroid surgery after she recover from her current illness   ·     Perforated appendicitis   · s/p aborted laparoscopic appendectomy  · Going for CT guided drain insertion today   · Management per surgery, and ID service     Tachycardia   · Likely multifactorial hyperthyroidism, pain, fever    The above issues were reviewed with the patient who understood and agreed with the plan. Thank you for allowing us to participate in the care of this patient. Please do not hesitate to contact us with any additional questions. Terri Younger MD  Endocrinologist, [facility] Diabetes Care and Endocrinology   [address] [facility] [address]   Phone: [phone]  Fax: [phone]  ---------------------------------  An electronic signature was used to authenticate this note.  Brigid Dominguez MD on 8/2/2021 at 5:46 PM

## 2021-08-03 LAB
ANION GAP SERPL CALCULATED.3IONS-SCNC: 8 MMOL/L (ref 7–16)
BASOPHILS ABSOLUTE: 0 E9/L (ref 0–0.2)
BASOPHILS RELATIVE PERCENT: 0 % (ref 0–2)
BLOOD CULTURE, ROUTINE: NORMAL
BUN BLDV-MCNC: 10 MG/DL (ref 6–20)
CALCIUM SERPL-MCNC: 8.1 MG/DL (ref 8.6–10.2)
CHLORIDE BLD-SCNC: 102 MMOL/L (ref 98–107)
CO2: 28 MMOL/L (ref 22–29)
CREAT SERPL-MCNC: 0.5 MG/DL (ref 0.5–1)
CULTURE, BLOOD 2: NORMAL
EOSINOPHILS ABSOLUTE: 0 E9/L (ref 0.05–0.5)
EOSINOPHILS RELATIVE PERCENT: 0 % (ref 0–6)
GFR AFRICAN AMERICAN: >60
GFR NON-AFRICAN AMERICAN: >60 ML/MIN/1.73
GLUCOSE BLD-MCNC: 99 MG/DL (ref 74–99)
GRAM STAIN ORDERABLE: NORMAL
HCT VFR BLD CALC: 30.2 % (ref 34–48)
HEMOGLOBIN: 9.7 G/DL (ref 11.5–15.5)
LYMPHOCYTES ABSOLUTE: 2.16 E9/L (ref 1.5–4)
LYMPHOCYTES RELATIVE PERCENT: 18.2 % (ref 20–42)
MCH RBC QN AUTO: 28 PG (ref 26–35)
MCHC RBC AUTO-ENTMCNC: 32.1 % (ref 32–34.5)
MCV RBC AUTO: 87.3 FL (ref 80–99.9)
METAMYELOCYTES RELATIVE PERCENT: 0.9 % (ref 0–1)
MONOCYTES ABSOLUTE: 0.48 E9/L (ref 0.1–0.95)
MONOCYTES RELATIVE PERCENT: 3.5 % (ref 2–12)
NEUTROPHILS ABSOLUTE: 9.36 E9/L (ref 1.8–7.3)
NEUTROPHILS RELATIVE PERCENT: 77.4 % (ref 43–80)
NUCLEATED RED BLOOD CELLS: 0 /100 WBC
PDW BLD-RTO: 13.2 FL (ref 11.5–15)
PLATELET # BLD: 239 E9/L (ref 130–450)
PMV BLD AUTO: 11.2 FL (ref 7–12)
POTASSIUM REFLEX MAGNESIUM: 4 MMOL/L (ref 3.5–5)
RBC # BLD: 3.46 E12/L (ref 3.5–5.5)
RBC # BLD: NORMAL 10*6/UL
SODIUM BLD-SCNC: 138 MMOL/L (ref 132–146)
T4 FREE: 0.98 NG/DL (ref 0.93–1.7)
THYROID STIMULATING IMMUNOGLOBULIN: <0.1 IU/L
WBC # BLD: 12 E9/L (ref 4.5–11.5)

## 2021-08-03 PROCEDURE — 6360000002 HC RX W HCPCS: Performed by: STUDENT IN AN ORGANIZED HEALTH CARE EDUCATION/TRAINING PROGRAM

## 2021-08-03 PROCEDURE — 36415 COLL VENOUS BLD VENIPUNCTURE: CPT

## 2021-08-03 PROCEDURE — 85025 COMPLETE CBC W/AUTO DIFF WBC: CPT

## 2021-08-03 PROCEDURE — 1200000000 HC SEMI PRIVATE

## 2021-08-03 PROCEDURE — 99232 SBSQ HOSP IP/OBS MODERATE 35: CPT | Performed by: INTERNAL MEDICINE

## 2021-08-03 PROCEDURE — 6360000002 HC RX W HCPCS: Performed by: SPECIALIST

## 2021-08-03 PROCEDURE — 6370000000 HC RX 637 (ALT 250 FOR IP): Performed by: INTERNAL MEDICINE

## 2021-08-03 PROCEDURE — 2580000003 HC RX 258: Performed by: STUDENT IN AN ORGANIZED HEALTH CARE EDUCATION/TRAINING PROGRAM

## 2021-08-03 PROCEDURE — 84439 ASSAY OF FREE THYROXINE: CPT

## 2021-08-03 PROCEDURE — 6370000000 HC RX 637 (ALT 250 FOR IP): Performed by: STUDENT IN AN ORGANIZED HEALTH CARE EDUCATION/TRAINING PROGRAM

## 2021-08-03 PROCEDURE — 2580000003 HC RX 258: Performed by: CLINICAL NURSE SPECIALIST

## 2021-08-03 PROCEDURE — 80048 BASIC METABOLIC PNL TOTAL CA: CPT

## 2021-08-03 PROCEDURE — 6360000002 HC RX W HCPCS: Performed by: CLINICAL NURSE SPECIALIST

## 2021-08-03 RX ORDER — PROPRANOLOL HYDROCHLORIDE 10 MG/1
10 TABLET ORAL 3 TIMES DAILY
Status: DISCONTINUED | OUTPATIENT
Start: 2021-08-03 | End: 2021-08-04

## 2021-08-03 RX ADMIN — SODIUM CHLORIDE, PRESERVATIVE FREE 10 ML: 5 INJECTION INTRAVENOUS at 20:29

## 2021-08-03 RX ADMIN — SODIUM CHLORIDE, PRESERVATIVE FREE 10 ML: 5 INJECTION INTRAVENOUS at 08:42

## 2021-08-03 RX ADMIN — FLUCONAZOLE 400 MG: 2 INJECTION, SOLUTION INTRAVENOUS at 15:51

## 2021-08-03 RX ADMIN — METHIMAZOLE 20 MG: 10 TABLET ORAL at 08:42

## 2021-08-03 RX ADMIN — METHIMAZOLE 15 MG: 10 TABLET ORAL at 20:28

## 2021-08-03 RX ADMIN — ACETAMINOPHEN 650 MG: 325 TABLET ORAL at 08:42

## 2021-08-03 RX ADMIN — ACETAMINOPHEN 650 MG: 325 TABLET ORAL at 03:49

## 2021-08-03 RX ADMIN — HYDROMORPHONE HYDROCHLORIDE 1 MG: 1 INJECTION, SOLUTION INTRAMUSCULAR; INTRAVENOUS; SUBCUTANEOUS at 20:26

## 2021-08-03 RX ADMIN — PROPRANOLOL HYDROCHLORIDE 20 MG: 20 TABLET ORAL at 13:14

## 2021-08-03 RX ADMIN — ACETAMINOPHEN 650 MG: 325 TABLET ORAL at 15:51

## 2021-08-03 RX ADMIN — ERTAPENEM SODIUM 1000 MG: 1 INJECTION, POWDER, LYOPHILIZED, FOR SOLUTION INTRAMUSCULAR; INTRAVENOUS at 11:11

## 2021-08-03 RX ADMIN — HYDROMORPHONE HYDROCHLORIDE 1 MG: 1 INJECTION, SOLUTION INTRAMUSCULAR; INTRAVENOUS; SUBCUTANEOUS at 09:50

## 2021-08-03 RX ADMIN — OXYCODONE 5 MG: 5 TABLET ORAL at 13:11

## 2021-08-03 RX ADMIN — METHIMAZOLE 20 MG: 10 TABLET ORAL at 13:14

## 2021-08-03 RX ADMIN — HYDROMORPHONE HYDROCHLORIDE 1 MG: 1 INJECTION, SOLUTION INTRAMUSCULAR; INTRAVENOUS; SUBCUTANEOUS at 02:32

## 2021-08-03 RX ADMIN — HYDROMORPHONE HYDROCHLORIDE 1 MG: 1 INJECTION, SOLUTION INTRAMUSCULAR; INTRAVENOUS; SUBCUTANEOUS at 06:30

## 2021-08-03 RX ADMIN — PROPRANOLOL HYDROCHLORIDE 20 MG: 20 TABLET ORAL at 08:42

## 2021-08-03 RX ADMIN — PROPRANOLOL HYDROCHLORIDE 10 MG: 10 TABLET ORAL at 20:26

## 2021-08-03 RX ADMIN — ACETAMINOPHEN 650 MG: 325 TABLET ORAL at 20:24

## 2021-08-03 ASSESSMENT — PAIN SCALES - GENERAL
PAINLEVEL_OUTOF10: 10
PAINLEVEL_OUTOF10: 9
PAINLEVEL_OUTOF10: 7
PAINLEVEL_OUTOF10: 8
PAINLEVEL_OUTOF10: 10
PAINLEVEL_OUTOF10: 7
PAINLEVEL_OUTOF10: 8
PAINLEVEL_OUTOF10: 7
PAINLEVEL_OUTOF10: 10
PAINLEVEL_OUTOF10: 5
PAINLEVEL_OUTOF10: 5

## 2021-08-03 ASSESSMENT — PAIN DESCRIPTION - ORIENTATION
ORIENTATION: RIGHT

## 2021-08-03 ASSESSMENT — PAIN DESCRIPTION - PROGRESSION
CLINICAL_PROGRESSION: GRADUALLY WORSENING

## 2021-08-03 ASSESSMENT — PAIN DESCRIPTION - LOCATION
LOCATION: ABDOMEN

## 2021-08-03 ASSESSMENT — PAIN - FUNCTIONAL ASSESSMENT: PAIN_FUNCTIONAL_ASSESSMENT: PREVENTS OR INTERFERES SOME ACTIVE ACTIVITIES AND ADLS

## 2021-08-03 ASSESSMENT — PAIN DESCRIPTION - PAIN TYPE
TYPE: ACUTE PAIN
TYPE: ACUTE PAIN;SURGICAL PAIN
TYPE: ACUTE PAIN
TYPE: ACUTE PAIN;SURGICAL PAIN

## 2021-08-03 ASSESSMENT — PAIN DESCRIPTION - DESCRIPTORS
DESCRIPTORS: ACHING;CONSTANT
DESCRIPTORS: ACHING;CONSTANT;DISCOMFORT
DESCRIPTORS: ACHING;DISCOMFORT;CONSTANT

## 2021-08-03 ASSESSMENT — PAIN DESCRIPTION - FREQUENCY
FREQUENCY: CONTINUOUS

## 2021-08-03 ASSESSMENT — PAIN DESCRIPTION - ONSET: ONSET: ON-GOING

## 2021-08-03 NOTE — PROGRESS NOTES
8/3/2021 pt c/o pain and redness at iv site. Left FA iv red and painful, unable to flush iv without increased pain. IV removed.

## 2021-08-03 NOTE — PROGRESS NOTES
[address] [place] [address] [facility]  Progress Note    SUBJECTIVE:  CC: abdominal pain     Patient is tolerating medications. Still with right sided abdominal pain - seems to be slightly improved. 2nd drain placed in IR - tan purulent output  Afebrile   Mother present     Review of systems:  As stated above in the chief complaint, otherwise negative. Medications:  Scheduled Meds:   fluconazole  400 mg Intravenous Q24H    ertapenem (INVanz) IVPB  1,000 mg Intravenous Q24H    propranolol  20 mg Oral TID    methIMAzole  20 mg Oral TID    acetaminophen  650 mg Oral Q6H    sodium chloride flush  5-40 mL Intravenous 2 times per day    [Held by provider] enoxaparin  40 mg Subcutaneous Daily     Continuous Infusions:   sodium chloride      sodium chloride      lactated ringers 100 mL/hr at 21 2335     PRN Meds:HYDROmorphone **OR** HYDROmorphone, sodium chloride flush, sodium chloride, oxyCODONE, benzocaine-menthol, sodium chloride flush, sodium chloride, ondansetron **OR** ondansetron    OBJECTIVE:  BP (!) 117/51   Pulse 85   Temp 97.3 °F (36.3 °C) (Temporal)   Resp 16   Ht 5' 2\" (1.575 m)   Wt 263 lb 6.4 oz (119.5 kg)   LMP 2021   SpO2 94%   BMI 48.18 kg/m²   Temp  Av.6 °F (37 °C)  Min: 97.3 °F (36.3 °C)  Max: 99.3 °F (37.4 °C)  Constitutional: The patient is lying in bed. +abdominal pain. Mother present. seems a bit more comfortable today    Skin: Warm and dry. No rashes were noted. HEENT: Round and reactive pupils. Moist mucous membranes. No ulcerations or thrush. Neck: Supple to movements. Chest: No respiratory distress. Room air. Symmetrical expansion. Diminished bibasilar   Cardiovascular: S1 and S2 are rhythmic and regular. Abdomen: Positive bowel sounds to auscultation. Diffuse tenderness to palpation, especially on the right. Soft. Lap sites clean, closed with surgical glue. MARY ANN drain with serous drainage.  2nd IR drain with tan purulent drainage Extremities: trace edema BLE.   Lines: peripheral    Laboratory and Tests Review:  Lab Results   Component Value Date    WBC 12.0 (H) 08/03/2021    WBC 14.4 (H) 08/02/2021    WBC 13.0 (H) 08/01/2021    HGB 9.7 (L) 08/03/2021    HCT 30.2 (L) 08/03/2021    MCV 87.3 08/03/2021     08/03/2021     Lab Results   Component Value Date    NEUTROABS 9.36 (H) 08/03/2021    NEUTROABS 11.52 (H) 08/02/2021    NEUTROABS 9.61 (H) 08/01/2021     No results found for: CRPHS  Lab Results   Component Value Date    ALT 26 07/28/2021    AST 20 07/28/2021    ALKPHOS 172 (H) 07/28/2021    BILITOT 1.1 07/28/2021     Lab Results   Component Value Date     08/03/2021    K 4.0 08/03/2021     08/03/2021    CO2 28 08/03/2021    BUN 10 08/03/2021    CREATININE 0.5 08/03/2021    CREATININE 0.5 08/02/2021    CREATININE 0.5 08/01/2021    GFRAA >60 08/03/2021    LABGLOM >60 08/03/2021    GLUCOSE 99 08/03/2021    PROT 7.8 07/28/2021    LABALBU 3.8 07/28/2021    CALCIUM 8.1 08/03/2021    BILITOT 1.1 07/28/2021    ALKPHOS 172 07/28/2021    AST 20 07/28/2021    ALT 26 07/28/2021     Lab Results   Component Value Date    CRP 12.1 (H) 07/28/2021     Lab Results   Component Value Date    SEDRATE 25 (H) 07/28/2021     Radiology:  CT scan:  1.  After recent the laparoscopic procedure to the right lower quadrant.       2.  Significant inflammatory process in the reaction is present the around   the cecum extending into the retrocecal area dissecting through   retroperitoneal spaces and into adjacent omental/mesentery fat planes along   the medial aspect of the ascending colon and the around and inferior to the   cecal area with amorphous fluid accumulation in the retrocecal/infra cecal   area covering an area of a 5.4 x 3.9 x 7 cm.       3.  The extensive a reactive a retroperitoneal and mesenteric adenopathy seen   around the and posterior to the cecum.  There is also a it evidence for edema   of the cecum and of the terminal ileum.

## 2021-08-03 NOTE — PROGRESS NOTES
General Surgery  Attending Physician Progress Note    No chief complaint on file. Subjective:  Pain has slightly improved today. RLQ IR drain placed 8/2. No nausea or vomiting. Afebrile. Leukocytosis improving. LABS:  CBC:   Lab Results   Component Value Date    WBC 12.0 08/03/2021    RBC 3.46 08/03/2021    HGB 9.7 08/03/2021    HCT 30.2 08/03/2021    MCV 87.3 08/03/2021    MCH 28.0 08/03/2021    MCHC 32.1 08/03/2021    RDW 13.2 08/03/2021     08/03/2021    MPV 11.2 08/03/2021     CMP:    Lab Results   Component Value Date     08/03/2021    K 4.0 08/03/2021     08/03/2021    CO2 28 08/03/2021    BUN 10 08/03/2021    CREATININE 0.5 08/03/2021    GFRAA >60 08/03/2021    LABGLOM >60 08/03/2021    GLUCOSE 99 08/03/2021    PROT 7.8 07/28/2021    LABALBU 3.8 07/28/2021    CALCIUM 8.1 08/03/2021    BILITOT 1.1 07/28/2021    ALKPHOS 172 07/28/2021    AST 20 07/28/2021    ALT 26 07/28/2021     BMP:    Lab Results   Component Value Date     08/03/2021    K 4.0 08/03/2021     08/03/2021    CO2 28 08/03/2021    BUN 10 08/03/2021    LABALBU 3.8 07/28/2021    CREATININE 0.5 08/03/2021    CALCIUM 8.1 08/03/2021    GFRAA >60 08/03/2021    LABGLOM >60 08/03/2021     Hepatic Function Panel:    Lab Results   Component Value Date    ALKPHOS 172 07/28/2021    ALT 26 07/28/2021    AST 20 07/28/2021    PROT 7.8 07/28/2021    BILITOT 1.1 07/28/2021    LABALBU 3.8 07/28/2021       Physical Exam:  Vitals:    08/03/21 0627   BP: (!) 133/59   Pulse: 83   Resp:    Temp:    SpO2:        General appearance: alert, cooperative and in no acute distress. Lungs: normal work of breathing  Heart: regular rate  Abdomen: soft, right sided tenderness. RLQ IR drain has purulent output. Surgical MARY ANN drain has serous output. Extremities: symmetrical without edema.     Impression/Plan:    Assessment: Toni Flores is an 22 y.o. female who presents with perforated appendicitis with intraabdominal abscess s/p RLQ IR drain placed 8/2. Plan:   - No acute surgical intervention indicated at this time  - Antibiotics per ID  - continue to wean IV pain medications        Claudia Malone, DO PGY-1  8/3/2021 at 6:37 AM     I saw and examined the patient. I reviewed the above resident's note. I agree with the assessment and plan as outlined. Continue drain. Improved today. If continues to look well tomorrow, will be ok for discharge pending ID antibiotic recommendations.     Paulette Andre MD  General Surgery

## 2021-08-03 NOTE — PROGRESS NOTES
ENDOCRINOLOGY PROGRESS NOTE    Date of Admission: 7/28/2021  Date of Service: 8/3/2021  Admitting Physician: Susan Tobin MD   Primary Care Physician: Jm Connors DO  Consultant physician: Meryle Laurence MD     Reason for the consultation:  Hyperthyroidism     History of Present Illness: The history is provided by the patient. Accuracy of the patient data is excellent. Marlys Evans is a very pleasant 22 y.o. old female with PMH obesity, hyperthyroidism admitted to [address] [place] [address] on 7/28/2021 because of nausea vomiting and abdominal pain and found to have acute appendicitis, endocrine service was consulted for management of hyperthyroidism    Subjective:   Seen and examined this AM, still c/o abdominal pain,      Scheduled Meds:   methIMAzole  15 mg Oral BID    fluconazole  400 mg Intravenous Q24H    ertapenem (INVanz) IVPB  1,000 mg Intravenous Q24H    propranolol  20 mg Oral TID    acetaminophen  650 mg Oral Q6H    sodium chloride flush  5-40 mL Intravenous 2 times per day    [Held by provider] enoxaparin  40 mg Subcutaneous Daily     PRN Meds:   HYDROmorphone, 0.5 mg, Q6H PRN   Or  HYDROmorphone, 1 mg, Q6H PRN  sodium chloride flush, 5-40 mL, PRN  sodium chloride, 25 mL, PRN  oxyCODONE, 5 mg, Q4H PRN  benzocaine-menthol, 1 lozenge, Q2H PRN  sodium chloride flush, 5-40 mL, PRN  sodium chloride, 25 mL, PRN  ondansetron, 4 mg, Q8H PRN   Or  ondansetron, 4 mg, Q6H PRN      Continuous Infusions:   sodium chloride      sodium chloride      lactated ringers 100 mL/hr at 08/01/21 2335       Review of Systems  All systems reviewed.  All negative except for symptoms mentioned in HPI     OBJECTIVE    /71   Pulse 90   Temp 99.2 °F (37.3 °C) (Oral)   Resp 16   Ht 5' 2\" (1.575 m)   Wt 263 lb 6.4 oz (119.5 kg)   LMP 07/22/2021   SpO2 98%   BMI 48.18 kg/m²   Wt Readings from Last 6 Encounters:   07/28/21 263 lb 6.4 oz (119.5 kg)   07/27/21 225 lb (102.1 kg)   07/27/21 225 lb (102.1 kg) 04/27/21 225 lb (102.1 kg)   12/16/20 212 lb (96.2 kg)   10/03/20 212 lb (96.2 kg)       Physical examination:  General: awake alert, oriented x3, no abnormal position or movements. HEENT: normocephalic non traumatic, no exophthalmos, no lid lag, no lid retraction   Neck: supple, no LN enlargement, + thyromegaly, Large Rt side nodule palpable, no thyroid tenderness, no thyroid bruit   Pulm: Clear equal air entry no added sounds, no wheezing or rhonchi    CVS: S1 + S2, no murmur, no heave. Dorsalis pedis pulse palpable   Abd: abdominal pain, s/p surgery   Skin: warm, no lesions, no rash.     Musculoskeletal: No back tenderness, no kyphosis/scoliosis    Neuro: CN intact, sensation notmal , muscle power normal. + tremors   Psych: normal mood, and affect    Review of Laboratory Data:  I personally reviewed the following labs:  Recent Labs     08/01/21 0425 08/02/21 0348 08/03/21  0408   WBC 13.0* 14.4* 12.0*   RBC 3.50 3.57 3.46*   HGB 9.7* 9.9* 9.7*   HCT 30.1* 31.0* 30.2*   MCV 86.0 86.8 87.3   MCH 27.7 27.7 28.0   MCHC 32.2 31.9* 32.1   RDW 13.2 13.2 13.2    236 239   MPV 11.1 11.2 11.2     Recent Labs     08/01/21 0425 08/02/21  0348 08/03/21  0408    133 138   K 3.7 3.7 4.0    99 102   CO2 28 27 28   BUN 10 9 10   CREATININE 0.5 0.5 0.5   GLUCOSE 103* 100* 99   CALCIUM 8.2* 8.2* 8.1*     No results found for: BHYDRXBUT  Lab Results   Component Value Date    LABA1C 5.3 07/15/2019     Lab Results   Component Value Date/Time    TSH <0.010 (L) 07/30/2021 04:20 PM    T4FREE 0.98 08/03/2021 04:08 AM    L2HGQLB 10.7 07/30/2021 04:20 PM    FT3 16.0 (H) 02/27/2020 02:17 AM    FT3 3.9 11/01/2017 12:35 PM    S7LLDCP 383.30 (H) 06/05/2019 01:50 PM    TSI <0.10 07/30/2021 04:20 PM    TPOABS 0.8 06/05/2019 01:50 PM    THGAB <0.9 06/05/2019 01:50 PM     Lab Results   Component Value Date    LABA1C 5.3 07/15/2019    GLUCOSE 99 08/03/2021     No results found for: TRIG, HDL, LDLCALC, CHOL    Blood culture Lab Results   Component Value Date    BC 5 Days no growth 07/29/2021    BC 5 Days- no growth 02/16/2019     Medical Records/Labs/Images Review:   I personally reviewed and summarized previous records   All labs and imaging were reviewed independently    Thyroid US 8/1 --> large complex nodule in the Rt lobe measuring 6 cm       ASSESSMENT & RECOMMENDATIONS   Cortes Davies, a 22 y.o.-old female seen in for management of Hyperthyroidism     Hyperthyroidism  · Due to toxic multinodular goiter  · TSI, TPO-Ab, Tg-Ab are negative   · Decrease Methimazole 15 mg BID   · Decrease Propranolol 10 mg TID  · Daily free T4 while inpatient      Multinodular goiter   · US 8/1/2021 --> large 6 cm complex nodule in the Rt thyroid lobe   · Pt will need thyroid surgery after she recover from her current illness   ·     Perforated appendicitis   · s/p aborted laparoscopic appendectomy  · Management per surgery, and ID service     Tachycardia   · Improving, Likely multifactorial hyperthyroidism, pain, fever    The above issues were reviewed with the patient who understood and agreed with the plan. Thank you for allowing us to participate in the care of this patient. Please do not hesitate to contact us with any additional questions. Jennifer Cheng MD  Endocrinologist, [facility] Diabetes Care and Endocrinology   [address] [facility] [address]   Phone: [phone]  Fax: [phone]  ---------------------------------  An electronic signature was used to authenticate this note.  Екатерина Rodriguez MD on 8/3/2021 at 7:24 PM

## 2021-08-03 NOTE — PROGRESS NOTES
Nurse notified with Dr. Heriberto Cardoso regarding pt request to eat and nurse received orders for general diet. Pt updated and she voiced an understanding.

## 2021-08-03 NOTE — CARE COORDINATION
Attempted to meet with patient- pt unavailable, in bathroom. [facility] following for [facility] needs.

## 2021-08-03 NOTE — PROGRESS NOTES
Julia Cuevas Hospitalist   Progress Note    Admitting Date and Time: 7/28/2021  5:03 AM  Admit Dx: Acute appendicitis [K35.80]     Seen for follow on multiple problems as listed below. Subjective:  Has increasing rt sided abdominal pain, with fever , nausea and diaphoresis. No vomiting or diarrhea. No CP or sob. CT with worsening inflammation rt abdomen. ID and surg aware. Continues with rt sided abdominal pain and nausea with low grade fever , + diaphoresis, no vomiting or diarrhea. Had CT guided drain placed for 4 cm RLQ abscess- 95 cc purulent fluid drained. Id & surg following. ROS: denies fever, chills, cp, sob,  HA unless stated above.      fluconazole  400 mg Intravenous Q24H    ertapenem (INVanz) IVPB  1,000 mg Intravenous Q24H    propranolol  20 mg Oral TID    methIMAzole  20 mg Oral TID    acetaminophen  650 mg Oral Q6H    sodium chloride flush  5-40 mL Intravenous 2 times per day    [Held by provider] enoxaparin  40 mg Subcutaneous Daily     HYDROmorphone, 0.5 mg, Q6H PRN   Or  HYDROmorphone, 1 mg, Q6H PRN  sodium chloride flush, 5-40 mL, PRN  sodium chloride, 25 mL, PRN  oxyCODONE, 5 mg, Q4H PRN  benzocaine-menthol, 1 lozenge, Q2H PRN  sodium chloride flush, 5-40 mL, PRN  sodium chloride, 25 mL, PRN  ondansetron, 4 mg, Q8H PRN   Or  ondansetron, 4 mg, Q6H PRN         Objective:    BP (!) 117/51   Pulse 85   Temp 97.3 °F (36.3 °C) (Temporal)   Resp 16   Ht 5' 2\" (1.575 m)   Wt 263 lb 6.4 oz (119.5 kg)   LMP 07/22/2021   SpO2 94%   BMI 48.18 kg/m²   General Appearance: alert and oriented to person, place and time, in no acute distress  Skin: warm and dry  Head: normocephalic and atraumatic  Eyes: pupils equal, round, and reactive to light, extraocular eye movements intact, conjunctivae normal  Neck: supple and non-tender without mass  Pulmonary/Chest: clear to auscultation bilaterally  Cardiovascular:Improving ST , regular rhythm, normal S1 and S2  Abdomen: soft, Diffuse tenderness rt > lt , IR drain right  abdomen, non-distended, normal bowel sounds, no masses or organomegaly  Extremities: no cyanosis, clubbing   Musculoskeletal: normal range of motion  Neurologic:  no cranial nerve deficit,  speech normal      Recent Labs     08/01/21 0425 08/02/21 0348 08/03/21  0408    133 138   K 3.7 3.7 4.0    99 102   CO2 28 27 28   BUN 10 9 10   CREATININE 0.5 0.5 0.5   GLUCOSE 103* 100* 99   CALCIUM 8.2* 8.2* 8.1*       Recent Labs     08/01/21 0425 08/02/21  0348 08/03/21  0408   WBC 13.0* 14.4* 12.0*   RBC 3.50 3.57 3.46*   HGB 9.7* 9.9* 9.7*   HCT 30.1* 31.0* 30.2*   MCV 86.0 86.8 87.3   MCH 27.7 27.7 28.0   MCHC 32.2 31.9* 32.1   RDW 13.2 13.2 13.2    236 239   MPV 11.1 11.2 11.2       Labs and images reviewed     Radiology:   US THYROID   Final Result   Right thyroid lobe dominant mass measuring up to 6 cm which is increased in   size compared to earlier exam and occupies the majority of the right thyroid   lobe. This represents a TR3 mass and FNA biopsy is recommended if not   already performed. ACR TI-RADS recommendations:      TR5 (>= 7 points):  FNA if >= 1 cm; follow-up if 0.5-0.9 cm in 1, 2, 3, 4,   and 5 years      TR4 (4-6 points):  FNA if >= 1.5 cm; follow-up if 1.0-1.4 cm in 1, 2, 3, and   5 years      TR3 (3 points):  FNA if >= 2.5 cm; follow-up if 1.5-2.4 cm in 1, 3, and 5   years      TR2 (2 points):  No FNA or follow-up      TR1 (0 points):  No FNA or follow-up      The findings were sent to the Radiology Results [address] at 8:10   am on 8/2/2021to be communicated to a licensed caregiver. RECOMMENDATIONS:   Right thyroid lobe FNA biopsy. CT ABDOMEN PELVIS W IV CONTRAST Additional Contrast? None   Final Result   1. After recent the laparoscopic procedure to the right lower quadrant.       2.  Significant inflammatory process in the reaction is present the around   the cecum extending into the retrocecal area dissecting through   retroperitoneal spaces and into adjacent omental/mesentery fat planes along   the medial aspect of the ascending colon and the around and inferior to the   cecal area with amorphous fluid accumulation in the retrocecal/infra cecal   area covering an area of a 5.4 x 3.9 x 7 cm. 3.  The extensive a reactive a retroperitoneal and mesenteric adenopathy seen   around the and posterior to the cecum. There is also a it evidence for edema   of the cecum and of the terminal ileum. 4.  In the retrocecal area there is a amorphous calcification which is   similar as observed in the pre operative study in the region of the appendix   which is relate with a fusiform shaped structure which is ill-defined which   connects with cecum, these represent segments of the appendix if not the   entirely appendix, with areas indicative of appendiceal perforation. 5.  The right surgical catheter is looping inferiorly to the cecum. IR INTERVENTIONAL RADIOLOGY PROCEDURE REQUEST    (Results Pending)   CT ABSCESS DRAINAGE W CATH PLACEMENT S&I    (Results Pending)       Assessment:    Principal Problem:    Acute appendicitis  Active Problems:    Sinus tachycardia  Resolved Problems:    * No resolved hospital problems. *      Plan:  Persistent sinus tachycardia-multifactorial.  Likely secondary to untreated hyperthyroidism, pain, fever and ongoing inflammation with perforated appendicitis. Will treat with Inderal with parameters to hold. Tachycrdia has improved. Monitor.     Untreated hyperthyroidism-Dr Becerril consulted & d/w him. Repeat TSH low , T4 elevated  thyroid-stimulating immunoglobulin, thyroid peroxidase antibody and antithyroid antibody pending ,  Dr. Jaz Ny following. Will need outpatient follow-up. Has hot nodule in the right thyroid as per patient. Reviewed endo notes. Started on Methimazole. Daily T4 while inpatient . Thyroid us with dominant mass 6 cm increased c/f earlier. needs FNA bx. Endo/ GS following & aware. Will have thyroid surg as out pt when she recovers from acute illness. Perforated appendicitis s/p aborted laparoscopic appendectomy, with abdominal washout and drain placement. As above s/p IR drainage of RLQ abscess and now with IR drain draining serosanguinous fluid. Continue antibiotics as per ID recommendation and postop care as per surgery.   Was on IV ceftriaxone plus Flagyl followed by IV Unasyn, now on IV ertapenem.            Electronically signed by Emmanuel Everett MD on 8/3/2021 at 10:06 AM

## 2021-08-03 NOTE — PLAN OF CARE
Problem: Pain:  Goal: Pain level will decrease  Description: Pain level will decrease  Outcome: Ongoing  Goal: Control of acute pain  Description: Control of acute pain  Outcome: Ongoing  Goal: Control of chronic pain  Description: Control of chronic pain  Outcome: Ongoing     Problem: Physical Regulation:  Goal: Ability to maintain a body temperature in the normal range will improve  Description: Ability to maintain a body temperature in the normal range will improve  Outcome: Ongoing  Goal: Ability to maintain vital signs within normal range will improve  Description: Ability to maintain vital signs within normal range will improve  Outcome: Ongoing  Goal: Postoperative complications will be avoided or minimized  Description: Postoperative complications will be avoided or minimized  Outcome: Ongoing  Goal: Diagnostic test results will improve  Description: Diagnostic test results will improve  Outcome: Ongoing  Goal: Will remain free from infection  Description: Will remain free from infection  Outcome: Ongoing     Problem: Activity:  Goal: Ability to tolerate increased activity will improve  Description: Ability to tolerate increased activity will improve  Outcome: Ongoing     Problem:  Bowel/Gastric:  Goal: Gastrointestinal status for postoperative course will improve  Description: Gastrointestinal status for postoperative course will improve  Outcome: Ongoing     Problem: Nutritional:  Goal: Maintenance of adequate nutrition will improve  Description: Maintenance of adequate nutrition will improve  Outcome: Ongoing  Goal: Nutritional status will improve  Description: Nutritional status will improve  Outcome: Ongoing     Problem: Sensory:  Goal: Pain level will decrease  Description: Pain level will decrease  Outcome: Ongoing     Problem: Skin Integrity:  Goal: Demonstration of wound healing without infection will improve  Description: Demonstration of wound healing without infection will improve  Outcome: Ongoing  Goal: Complications related to intravenous access or infusion will be avoided or minimized  Description: Complications related to intravenous access or infusion will be avoided or minimized  Outcome: Ongoing     Problem: Falls - Risk of:  Goal: Will remain free from falls  Description: Will remain free from falls  Outcome: Ongoing  Goal: Absence of physical injury  Description: Absence of physical injury  Outcome: Ongoing     Problem: Respiratory:  Goal: Ability to maintain normal respiratory secretions will improve  Description: Ability to maintain normal respiratory secretions will improve  Outcome: Ongoing

## 2021-08-03 NOTE — PROGRESS NOTES
Pt mother arrived to floor very upset stated that her daughter called her and said a man was in her room and was trying to take her to alf. Nurse and mom walked in pt room asking her about the man that was in her room. Pt states that she just woke up and was unaware of what the mother was talking about. Mom pulled up her call log and stated pt called her at 1411 and again and 1415 asking her to call the alf to let them know she is ill and cant go to alf/ pt states she must have been dreaming and mom states she did this once before. Pt states her dream have been vivid since she has been on this medication while in hospital. Will monitor.

## 2021-08-04 LAB
ANION GAP SERPL CALCULATED.3IONS-SCNC: 7 MMOL/L (ref 7–16)
ATYPICAL LYMPHOCYTE RELATIVE PERCENT: 8 % (ref 0–4)
BASOPHILS ABSOLUTE: 0 E9/L (ref 0–0.2)
BASOPHILS RELATIVE PERCENT: 0 % (ref 0–2)
BUN BLDV-MCNC: 6 MG/DL (ref 6–20)
CALCIUM SERPL-MCNC: 8.3 MG/DL (ref 8.6–10.2)
CHLORIDE BLD-SCNC: 101 MMOL/L (ref 98–107)
CO2: 27 MMOL/L (ref 22–29)
CREAT SERPL-MCNC: 0.5 MG/DL (ref 0.5–1)
EOSINOPHILS ABSOLUTE: 0.09 E9/L (ref 0.05–0.5)
EOSINOPHILS RELATIVE PERCENT: 1 % (ref 0–6)
GFR AFRICAN AMERICAN: >60
GFR NON-AFRICAN AMERICAN: >60 ML/MIN/1.73
GLUCOSE BLD-MCNC: 87 MG/DL (ref 74–99)
HCT VFR BLD CALC: 29.5 % (ref 34–48)
HEMOGLOBIN: 9.4 G/DL (ref 11.5–15.5)
HYPOCHROMIA: ABNORMAL
LYMPHOCYTES ABSOLUTE: 2.06 E9/L (ref 1.5–4)
LYMPHOCYTES RELATIVE PERCENT: 16 % (ref 20–42)
MCH RBC QN AUTO: 27.9 PG (ref 26–35)
MCHC RBC AUTO-ENTMCNC: 31.9 % (ref 32–34.5)
MCV RBC AUTO: 87.5 FL (ref 80–99.9)
MONOCYTES ABSOLUTE: 0.43 E9/L (ref 0.1–0.95)
MONOCYTES RELATIVE PERCENT: 5 % (ref 2–12)
NEUTROPHILS ABSOLUTE: 6.02 E9/L (ref 1.8–7.3)
NEUTROPHILS RELATIVE PERCENT: 70 % (ref 43–80)
PDW BLD-RTO: 13 FL (ref 11.5–15)
PLATELET # BLD: 264 E9/L (ref 130–450)
PMV BLD AUTO: 11.4 FL (ref 7–12)
POLYCHROMASIA: ABNORMAL
POTASSIUM REFLEX MAGNESIUM: 4 MMOL/L (ref 3.5–5)
RBC # BLD: 3.37 E12/L (ref 3.5–5.5)
SODIUM BLD-SCNC: 135 MMOL/L (ref 132–146)
WBC # BLD: 8.6 E9/L (ref 4.5–11.5)

## 2021-08-04 PROCEDURE — 6360000002 HC RX W HCPCS: Performed by: CLINICAL NURSE SPECIALIST

## 2021-08-04 PROCEDURE — 2580000003 HC RX 258: Performed by: STUDENT IN AN ORGANIZED HEALTH CARE EDUCATION/TRAINING PROGRAM

## 2021-08-04 PROCEDURE — 6370000000 HC RX 637 (ALT 250 FOR IP): Performed by: STUDENT IN AN ORGANIZED HEALTH CARE EDUCATION/TRAINING PROGRAM

## 2021-08-04 PROCEDURE — 6370000000 HC RX 637 (ALT 250 FOR IP): Performed by: INTERNAL MEDICINE

## 2021-08-04 PROCEDURE — 6360000002 HC RX W HCPCS: Performed by: STUDENT IN AN ORGANIZED HEALTH CARE EDUCATION/TRAINING PROGRAM

## 2021-08-04 PROCEDURE — 2580000003 HC RX 258: Performed by: CLINICAL NURSE SPECIALIST

## 2021-08-04 PROCEDURE — 36415 COLL VENOUS BLD VENIPUNCTURE: CPT

## 2021-08-04 PROCEDURE — 85025 COMPLETE CBC W/AUTO DIFF WBC: CPT

## 2021-08-04 PROCEDURE — 80048 BASIC METABOLIC PNL TOTAL CA: CPT

## 2021-08-04 PROCEDURE — 99232 SBSQ HOSP IP/OBS MODERATE 35: CPT | Performed by: INTERNAL MEDICINE

## 2021-08-04 PROCEDURE — 6370000000 HC RX 637 (ALT 250 FOR IP): Performed by: SPECIALIST

## 2021-08-04 PROCEDURE — 1200000000 HC SEMI PRIVATE

## 2021-08-04 RX ADMIN — ACETAMINOPHEN 650 MG: 325 TABLET ORAL at 15:06

## 2021-08-04 RX ADMIN — OXYCODONE 5 MG: 5 TABLET ORAL at 22:54

## 2021-08-04 RX ADMIN — OXYCODONE 5 MG: 5 TABLET ORAL at 12:46

## 2021-08-04 RX ADMIN — PROPRANOLOL HYDROCHLORIDE 10 MG: 10 TABLET ORAL at 08:20

## 2021-08-04 RX ADMIN — SODIUM CHLORIDE, PRESERVATIVE FREE 10 ML: 5 INJECTION INTRAVENOUS at 08:21

## 2021-08-04 RX ADMIN — FLUCONAZOLE 400 MG: 150 TABLET ORAL at 21:15

## 2021-08-04 RX ADMIN — METHIMAZOLE 15 MG: 10 TABLET ORAL at 21:15

## 2021-08-04 RX ADMIN — OXYCODONE 5 MG: 5 TABLET ORAL at 00:12

## 2021-08-04 RX ADMIN — OXYCODONE 5 MG: 5 TABLET ORAL at 08:20

## 2021-08-04 RX ADMIN — METHIMAZOLE 15 MG: 10 TABLET ORAL at 08:20

## 2021-08-04 RX ADMIN — HYDROMORPHONE HYDROCHLORIDE 1 MG: 1 INJECTION, SOLUTION INTRAMUSCULAR; INTRAVENOUS; SUBCUTANEOUS at 02:54

## 2021-08-04 RX ADMIN — OXYCODONE 5 MG: 5 TABLET ORAL at 17:50

## 2021-08-04 RX ADMIN — ACETAMINOPHEN 650 MG: 325 TABLET ORAL at 08:20

## 2021-08-04 RX ADMIN — ACETAMINOPHEN 650 MG: 325 TABLET ORAL at 21:14

## 2021-08-04 RX ADMIN — ACETAMINOPHEN 650 MG: 325 TABLET ORAL at 02:49

## 2021-08-04 RX ADMIN — ERTAPENEM SODIUM 1000 MG: 1 INJECTION, POWDER, LYOPHILIZED, FOR SOLUTION INTRAMUSCULAR; INTRAVENOUS at 11:17

## 2021-08-04 RX ADMIN — PROPRANOLOL HYDROCHLORIDE 10 MG: 10 TABLET ORAL at 15:07

## 2021-08-04 ASSESSMENT — PAIN SCALES - GENERAL
PAINLEVEL_OUTOF10: 6
PAINLEVEL_OUTOF10: 9
PAINLEVEL_OUTOF10: 0
PAINLEVEL_OUTOF10: 6
PAINLEVEL_OUTOF10: 7
PAINLEVEL_OUTOF10: 9
PAINLEVEL_OUTOF10: 6
PAINLEVEL_OUTOF10: 5
PAINLEVEL_OUTOF10: 6

## 2021-08-04 ASSESSMENT — PAIN DESCRIPTION - FREQUENCY
FREQUENCY: CONTINUOUS
FREQUENCY: CONTINUOUS

## 2021-08-04 ASSESSMENT — PAIN DESCRIPTION - ORIENTATION
ORIENTATION: RIGHT

## 2021-08-04 ASSESSMENT — PAIN DESCRIPTION - PROGRESSION
CLINICAL_PROGRESSION: NOT CHANGED
CLINICAL_PROGRESSION: GRADUALLY WORSENING

## 2021-08-04 ASSESSMENT — PAIN DESCRIPTION - DESCRIPTORS
DESCRIPTORS: ACHING;DISCOMFORT;CONSTANT
DESCRIPTORS: ACHING;DISCOMFORT
DESCRIPTORS: ACHING;DISCOMFORT

## 2021-08-04 ASSESSMENT — PAIN DESCRIPTION - PAIN TYPE
TYPE: ACUTE PAIN;SURGICAL PAIN
TYPE: ACUTE PAIN;SURGICAL PAIN
TYPE: ACUTE PAIN

## 2021-08-04 ASSESSMENT — PAIN DESCRIPTION - ONSET
ONSET: ON-GOING

## 2021-08-04 ASSESSMENT — PAIN DESCRIPTION - LOCATION
LOCATION: ABDOMEN

## 2021-08-04 ASSESSMENT — PAIN - FUNCTIONAL ASSESSMENT
PAIN_FUNCTIONAL_ASSESSMENT: PREVENTS OR INTERFERES SOME ACTIVE ACTIVITIES AND ADLS

## 2021-08-04 NOTE — CARE COORDINATION
Met with patient- discussed plan of care and discharge plans. Discharge plan remains HOME. Pt resides at home with her 3 yr old son, sons father, and her two cousins. Pt is independent and denies need for any assitive devices. Pt will discharge home with [facility]. Awaiting final ID plan regarding ABX therapy. IF PICC and IV abx needed for discharge, signed IV abx script will need faxed to [facility] prior to discharge. Spoke with [facility] at [facility]- she remains following.

## 2021-08-04 NOTE — PROGRESS NOTES
GENERAL SURGERY  DAILY PROGRESS NOTE  8/4/2021    CHIEF COMPLAINT:  No chief complaint on file. SUBJECTIVE:  Patient still complaining of some abdominal tenderness denies N/V. Tolerating regular diet ok. Drain has minimal purulent fluid. Tolerating diet -- ADULT DIET; Regular; Low Fiber    OBJECTIVE:  BP (!) 104/59   Pulse 76   Temp 98.1 °F (36.7 °C) (Oral)   Resp 16   Ht 5' 2\" (1.575 m)   Wt 263 lb 6.4 oz (119.5 kg)   LMP 07/22/2021   SpO2 98%   BMI 48.18 kg/m²     GENERAL:  NAD. A&Ox3. LUNGS:  No increased work of breathing. CARDIOVASCULAR: RR  ABDOMEN:  Soft, non-distended, moderately tender no rigidity, rebound. ASSESSMENT/PLAN:  22 y.o. female with appendicitis s/p lap appy with MARY ANN drain placement (7/28)  - appreciate ID recommendations prior to d/c        Bib Yousif MD  Surgery Resident PGY-1  8/4/2021  6:51 AM    I saw and examined the patient. I reviewed the above resident's note. I agree with the assessment and plan as outlined. Continue drains  Antibiotics per ID - awaiting final recommendations so the patient can be discharged  She is stable to discharge today.     Shelia Petty MD  General Surgery

## 2021-08-04 NOTE — PROGRESS NOTES
ENDOCRINOLOGY PROGRESS NOTE    Date of Admission: 7/28/2021  Date of Service: 8/4/2021  Admitting Physician: Power Porter MD   Primary Care Physician: Lacey Newman DO  Consultant physician: Amanda Mccann MD     Reason for the consultation:  Hyperthyroidism     History of Present Illness: The history is provided by the patient. Accuracy of the patient data is excellent. Heriberto Means is a very pleasant 22 y.o. old female with PMH obesity, hyperthyroidism admitted to [facility] on 7/28/2021 because of nausea vomiting and abdominal pain and found to have acute appendicitis, endocrine service was consulted for management of hyperthyroidism    Subjective:   Seen and examined, no acute issues overnight     Scheduled Meds:   fluconazole  400 mg Oral Daily    methIMAzole  15 mg Oral BID    ertapenem (INVanz) IVPB  1,000 mg Intravenous Q24H    acetaminophen  650 mg Oral Q6H    sodium chloride flush  5-40 mL Intravenous 2 times per day    [Held by provider] enoxaparin  40 mg Subcutaneous Daily     PRN Meds:   HYDROmorphone, 0.5 mg, Q6H PRN   Or  HYDROmorphone, 1 mg, Q6H PRN  sodium chloride flush, 5-40 mL, PRN  sodium chloride, 25 mL, PRN  oxyCODONE, 5 mg, Q4H PRN  benzocaine-menthol, 1 lozenge, Q2H PRN  sodium chloride flush, 5-40 mL, PRN  sodium chloride, 25 mL, PRN  ondansetron, 4 mg, Q8H PRN   Or  ondansetron, 4 mg, Q6H PRN      Continuous Infusions:   sodium chloride      sodium chloride      lactated ringers 100 mL/hr at 08/01/21 2335       Review of Systems  All systems reviewed.  All negative except for symptoms mentioned in HPI     OBJECTIVE    /60   Pulse 84   Temp 96.9 °F (36.1 °C) (Oral)   Resp 16   Ht 5' 2\" (1.575 m)   Wt 263 lb 6.4 oz (119.5 kg)   LMP 07/22/2021   SpO2 95%   BMI 48.18 kg/m²   Wt Readings from Last 6 Encounters:   07/28/21 263 lb 6.4 oz (119.5 kg)   07/27/21 225 lb (102.1 kg)   07/27/21 225 lb (102.1 kg)   04/27/21 225 lb (102.1 kg)   12/16/20 212 lb (96.2 kg) 10/03/20 212 lb (96.2 kg)       Physical examination:  General: awake alert, oriented x3, no abnormal position or movements. HEENT: normocephalic non traumatic, no exophthalmos, no lid lag, no lid retraction   Neck: supple, no LN enlargement, + thyromegaly, Large Rt side nodule palpable, no thyroid tenderness, no thyroid bruit   Pulm: Clear equal air entry no added sounds, no wheezing or rhonchi    CVS: S1 + S2, no murmur, no heave. Dorsalis pedis pulse palpable   Abd: abdominal pain, s/p surgery   Skin: warm, no lesions, no rash.     Musculoskeletal: No back tenderness, no kyphosis/scoliosis    Neuro: CN intact, sensation notmal , muscle power normal. + tremors   Psych: normal mood, and affect    Review of Laboratory Data:  I personally reviewed the following labs:  Recent Labs     08/02/21 0348 08/03/21  0408 08/04/21  0720   WBC 14.4* 12.0* 8.6   RBC 3.57 3.46* 3.37*   HGB 9.9* 9.7* 9.4*   HCT 31.0* 30.2* 29.5*   MCV 86.8 87.3 87.5   MCH 27.7 28.0 27.9   MCHC 31.9* 32.1 31.9*   RDW 13.2 13.2 13.0    239 264   MPV 11.2 11.2 11.4     Recent Labs     08/02/21 0348 08/03/21  0408 08/04/21  0720    138 135   K 3.7 4.0 4.0   CL 99 102 101   CO2 27 28 27   BUN 9 10 6   CREATININE 0.5 0.5 0.5   GLUCOSE 100* 99 87   CALCIUM 8.2* 8.1* 8.3*     No results found for: BHYDRXBUT  Lab Results   Component Value Date    LABA1C 5.3 07/15/2019     Lab Results   Component Value Date/Time    TSH <0.010 (L) 07/30/2021 04:20 PM    T4FREE 0.98 08/03/2021 04:08 AM    P7DVUCQ 10.7 07/30/2021 04:20 PM    FT3 16.0 (H) 02/27/2020 02:17 AM    FT3 3.9 11/01/2017 12:35 PM    Q9URJVR 383.30 (H) 06/05/2019 01:50 PM    TSI <0.10 07/30/2021 04:20 PM    TPOABS 0.8 06/05/2019 01:50 PM    THGAB <0.9 06/05/2019 01:50 PM     Lab Results   Component Value Date    LABA1C 5.3 07/15/2019    GLUCOSE 87 08/04/2021     No results found for: TRIG, HDL, LDLCALC, CHOL    Blood culture   Lab Results   Component Value Date    BC 5 Days no growth 07/29/2021    BC 5 Days- no growth 02/16/2019     Medical Records/Labs/Images Review:   I personally reviewed and summarized previous records   All labs and imaging were reviewed independently    Thyroid US 8/1 --> large complex nodule in the Rt lobe measuring 6 cm       ASSESSMENT & RECOMMENDATIONS   simona Deng 39 y.o.-old female seen in for management of Hyperthyroidism     Hyperthyroidism due to toxic multinodular goiter  · Free T4 now 0.9   · We recommend the following regimen  · Methimazole 15 mg BID (likely decrease to 10 mg BID tomorrow)   · Stop Propranolol   · Daily free T4 while inpatient      Multinodular goiter   · US 8/1/2021 --> large 6 cm complex nodule in the Rt thyroid lobe   · Pt will need thyroid surgery after she recover from her current illness   ·     Perforated appendicitis   · s/p aborted laparoscopic appendectomy  · Management per surgery, and ID service     Tachycardia   · Resolved, stop Propranolol  · Likely multifactorial hyperthyroidism, pain, fever    The above issues were reviewed with the patient who understood and agreed with the plan. Thank you for allowing us to participate in the care of this patient. Please do not hesitate to contact us with any additional questions. John Caban MD  Endocrinologist, [facility]   [address] [facility] [address]   Phone: [phone]  Fax: [phone]  ---------------------------------  An electronic signature was used to authenticate this note.  Gillian Murray MD on 8/4/2021 at 7:00 PM

## 2021-08-04 NOTE — PROGRESS NOTES
Julia Cuevas Hospitalist   Progress Note    Admitting Date and Time: 7/28/2021  5:03 AM  Admit Dx: Acute appendicitis [K35.80]     Seen for follow on multiple problems as listed below. Subjective:  Fever has resolved , rt pain & nausea persists but better c/f yesterday , WBC down trending , no vomiting. No CP or sob. D/w nursing. ROS: denies fever, chills, cp, sob,  HA unless stated above.      methIMAzole  15 mg Oral BID    propranolol  10 mg Oral TID    fluconazole  400 mg Intravenous Q24H    ertapenem (INVanz) IVPB  1,000 mg Intravenous Q24H    acetaminophen  650 mg Oral Q6H    sodium chloride flush  5-40 mL Intravenous 2 times per day    [Held by provider] enoxaparin  40 mg Subcutaneous Daily     HYDROmorphone, 0.5 mg, Q6H PRN   Or  HYDROmorphone, 1 mg, Q6H PRN  sodium chloride flush, 5-40 mL, PRN  sodium chloride, 25 mL, PRN  oxyCODONE, 5 mg, Q4H PRN  benzocaine-menthol, 1 lozenge, Q2H PRN  sodium chloride flush, 5-40 mL, PRN  sodium chloride, 25 mL, PRN  ondansetron, 4 mg, Q8H PRN   Or  ondansetron, 4 mg, Q6H PRN         Objective:    /74   Pulse 78   Temp 97.7 °F (36.5 °C) (Oral)   Resp 16   Ht 5' 2\" (1.575 m)   Wt 263 lb 6.4 oz (119.5 kg)   LMP 07/22/2021   SpO2 95%   BMI 48.18 kg/m²   General Appearance: alert and oriented to person, place and time, in no acute distress  Skin: warm and dry  Head: normocephalic and atraumatic  Eyes: pupils equal, round, and reactive to light, extraocular eye movements intact, conjunctivae normal  Neck: supple and non-tender without mass  Pulmonary/Chest: clear to auscultation bilaterally  Cardiovascular:Improving ST , regular rhythm, normal S1 and S2  Abdomen: soft, Diffuse tenderness rt > lt , IR drain right  Abdomen draining serosanguinous fluid , non-distended, normal bowel sounds, no masses or organomegaly  Extremities: no cyanosis, clubbing   Musculoskeletal: normal range of motion  Neurologic:  no cranial nerve deficit,  speech normal      Recent Labs     08/02/21  0348 08/03/21  0408    138   K 3.7 4.0   CL 99 102   CO2 27 28   BUN 9 10   CREATININE 0.5 0.5   GLUCOSE 100* 99   CALCIUM 8.2* 8.1*       Recent Labs     08/02/21  0348 08/03/21  0408   WBC 14.4* 12.0*   RBC 3.57 3.46*   HGB 9.9* 9.7*   HCT 31.0* 30.2*   MCV 86.8 87.3   MCH 27.7 28.0   MCHC 31.9* 32.1   RDW 13.2 13.2    239   MPV 11.2 11.2       Labs and images reviewed     Radiology:   CT ABSCESS DRAINAGE W CATH PLACEMENT S&I   Final Result   CT guided right lower quadrant abscess catheter insertion. US THYROID   Final Result   Right thyroid lobe dominant mass measuring up to 6 cm which is increased in   size compared to earlier exam and occupies the majority of the right thyroid   lobe. This represents a TR3 mass and FNA biopsy is recommended if not   already performed. ACR TI-RADS recommendations:      TR5 (>= 7 points):  FNA if >= 1 cm; follow-up if 0.5-0.9 cm in 1, 2, 3, 4,   and 5 years      TR4 (4-6 points):  FNA if >= 1.5 cm; follow-up if 1.0-1.4 cm in 1, 2, 3, and   5 years      TR3 (3 points):  FNA if >= 2.5 cm; follow-up if 1.5-2.4 cm in 1, 3, and 5   years      TR2 (2 points):  No FNA or follow-up      TR1 (0 points):  No FNA or follow-up      The findings were sent to the Radiology Results [address] at 8:10   am on 8/2/2021to be communicated to a licensed caregiver. RECOMMENDATIONS:   Right thyroid lobe FNA biopsy. CT ABDOMEN PELVIS W IV CONTRAST Additional Contrast? None   Final Result   1. After recent the laparoscopic procedure to the right lower quadrant.       2.  Significant inflammatory process in the reaction is present the around   the cecum extending into the retrocecal area dissecting through   retroperitoneal spaces and into adjacent omental/mesentery fat planes along   the medial aspect of the ascending colon and the around and inferior to the   cecal area with amorphous fluid accumulation in the retrocecal/infra cecal   area covering an area of a 5.4 x 3.9 x 7 cm. 3.  The extensive a reactive a retroperitoneal and mesenteric adenopathy seen   around the and posterior to the cecum. There is also a it evidence for edema   of the cecum and of the terminal ileum. 4.  In the retrocecal area there is a amorphous calcification which is   similar as observed in the pre operative study in the region of the appendix   which is relate with a fusiform shaped structure which is ill-defined which   connects with cecum, these represent segments of the appendix if not the   entirely appendix, with areas indicative of appendiceal perforation. 5.  The right surgical catheter is looping inferiorly to the cecum. IR INTERVENTIONAL RADIOLOGY PROCEDURE REQUEST    (Results Pending)   CT TUBE CATHETER FOLLOW UP    (Results Pending)       Assessment:    Principal Problem:    Acute appendicitis  Active Problems:    Sinus tachycardia  Resolved Problems:    * No resolved hospital problems. *      Plan:  Sinus tachycardia-multifactorial.  Likely secondary to untreated hyperthyroidism, pain, fever and ongoing inflammation with perforated appendicitis. Will treat with Inderal with parameters to hold. Tachycardia has improved. Continue to monitor.     Untreated hyperthyroidism-Dr Becerril consulted & d/w him. Repeat TSH low , T4 elevated  thyroid-stimulating immunoglobulin in < 0.10, thyroid peroxidase antibody and antithyroid antibody pending ,  Dr. Prabhjot Melgar following. Reviewed endo notes. Started on Methimazole which is decreased . Follow daily T4 while inpatient . Thyroid us with dominant mass 6 cm increased c/f earlier. needs FNA bx. Endo. Will have thyroid surg as out pt when she recovers from acute illness. Needs close follow with endo , d/w her. Perforated appendicitis s/p aborted laparoscopic appendectomy, with abdominal washout and drain placement. As above s/p IR drainage of RLQ abscess and now with IR drain draining serosanguinous fluid. Continue antibiotics as per ID recommendation and postop care as per surgery. Was on IV ceftriaxone plus Flagyl followed by IV Unasyn, now on IV ertapenem. Body fluid cxs from 7/28 with  e coli .            Electronically signed by Maude Figueroa MD on 8/4/2021 at 7:52 AM

## 2021-08-04 NOTE — PLAN OF CARE
Problem: Pain:  Goal: Pain level will decrease  Description: Pain level will decrease  Outcome: Met This Shift     Problem: Pain:  Goal: Control of acute pain  Description: Control of acute pain  Outcome: Met This Shift     Problem: Physical Regulation:  Goal: Ability to maintain a body temperature in the normal range will improve  Description: Ability to maintain a body temperature in the normal range will improve  Outcome: Met This Shift

## 2021-08-04 NOTE — PROGRESS NOTES
[address] [place] [address] [facility]  Progress Note    SUBJECTIVE:  CC: abdominal pain     The patient is tolerating current antibiotic. No nausea or vomiting. She is still having significant amount of pain in the right lower quadrant. She does think that it is somewhat better. Review of systems:  As stated above in the chief complaint, otherwise negative. Medications:  Scheduled Meds:   methIMAzole  15 mg Oral BID    propranolol  10 mg Oral TID    fluconazole  400 mg Intravenous Q24H    ertapenem (INVanz) IVPB  1,000 mg Intravenous Q24H    acetaminophen  650 mg Oral Q6H    sodium chloride flush  5-40 mL Intravenous 2 times per day    [Held by provider] enoxaparin  40 mg Subcutaneous Daily     Continuous Infusions:   sodium chloride      sodium chloride      lactated ringers 100 mL/hr at 21 2335     PRN Meds:HYDROmorphone **OR** HYDROmorphone, sodium chloride flush, sodium chloride, oxyCODONE, benzocaine-menthol, sodium chloride flush, sodium chloride, ondansetron **OR** ondansetron    OBJECTIVE:  /64   Pulse 89   Temp 96.9 °F (36.1 °C) (Temporal)   Resp 16   Ht 5' 2\" (1.575 m)   Wt 263 lb 6.4 oz (119.5 kg)   LMP 2021   SpO2 96%   BMI 48.18 kg/m²   Temp  Av.3 °F (36.8 °C)  Min: 96.9 °F (36.1 °C)  Max: 99.3 °F (37.4 °C)  Constitutional: The patient is lying in bed. She is awake and alert. No distress. Pillow on abdomen still. Skin: Warm and dry. No rashes were noted. HEENT: Round and reactive pupils. Moist mucous membranes. No ulcerations or thrush. Neck: Supple to movements. Chest: No respiratory distress. Room air. Symmetrical expansion. Diminished bibasilar   Cardiovascular: S1 and S2 are rhythmic and regular. Abdomen: Positive bowel sounds to auscultation. Diffuse tenderness to palpation, especially on the right. Soft. Lap sites clean, closed with surgical glue. MARY ANN drain with serous drainage. 2nd IR drain with purulent fluid. Extremities: trace edema BLE.   Lines: peripheral    Laboratory and Tests Review:  Lab Results   Component Value Date    WBC 8.6 08/04/2021    WBC 12.0 (H) 08/03/2021    WBC 14.4 (H) 08/02/2021    HGB 9.4 (L) 08/04/2021    HCT 29.5 (L) 08/04/2021    MCV 87.5 08/04/2021     08/04/2021     Lab Results   Component Value Date    NEUTROABS 6.02 08/04/2021    NEUTROABS 9.36 (H) 08/03/2021    NEUTROABS 11.52 (H) 08/02/2021     No results found for: CRP  Lab Results   Component Value Date    ALT 26 07/28/2021    AST 20 07/28/2021    ALKPHOS 172 (H) 07/28/2021    BILITOT 1.1 07/28/2021     Lab Results   Component Value Date     08/04/2021    K 4.0 08/04/2021     08/04/2021    CO2 27 08/04/2021    BUN 6 08/04/2021    CREATININE 0.5 08/04/2021    CREATININE 0.5 08/03/2021    CREATININE 0.5 08/02/2021    GFRAA >60 08/04/2021    LABGLOM >60 08/04/2021    GLUCOSE 87 08/04/2021    PROT 7.8 07/28/2021    LABALBU 3.8 07/28/2021    CALCIUM 8.3 08/04/2021    BILITOT 1.1 07/28/2021    ALKPHOS 172 07/28/2021    AST 20 07/28/2021    ALT 26 07/28/2021     Lab Results   Component Value Date    CRP 12.1 (H) 07/28/2021     Lab Results   Component Value Date    SEDRATE 25 (H) 07/28/2021     Radiology:  CT scan:  1.  After recent the laparoscopic procedure to the right lower quadrant.       2.  Significant inflammatory process in the reaction is present the around   the cecum extending into the retrocecal area dissecting through   retroperitoneal spaces and into adjacent omental/mesentery fat planes along   the medial aspect of the ascending colon and the around and inferior to the   cecal area with amorphous fluid accumulation in the retrocecal/infra cecal   area covering an area of a 5.4 x 3.9 x 7 cm.       3.  The extensive a reactive a retroperitoneal and mesenteric adenopathy seen   around the and posterior to the cecum.  There is also a it evidence for edema   of the cecum and of the terminal ileum.       4.  In the retrocecal area there is a amorphous calcification which is   similar as observed in the pre operative study in the region of the appendix   which is relate with a fusiform shaped structure which is ill-defined which   connects with cecum, these represent segments of the appendix if not the   entirely appendix, with areas indicative of appendiceal perforation.       5.  The right surgical catheter is looping inferiorly to the cecum. Microbiology:   7/28/2021- gram stain- body fluid- E.coli x 2 morphologies   No anaerobes   Blood Culture- negative so far  MRSA screen- not isolated  Fungitell - negative   RLQ abscess fluid culture 8/2/2021: E. coli x2 (1 is pansensitive and the other one is Levofloxacin and Ampicillin resistant), anaerobic    ASSESSMENT:  · Acute appendicitis   · S/p attempted lap appendectomy with drain placement   · Leukocytosis associated to appendicitis, no change    · Fevers - low grade, improving   · Status post CT guided aspiration and drain placement in RLQ abdominal abscess 8/2/2021: Cultures growing fairly sensitive E. coli and anaerobes    PLAN:  · Continue Ertapenem & Fluconzaole for a minimum of 3 weeks or until the abscess has resolved  · PICC for IV antibiotic    Informed Consent for PICC:  I explained the risks, benefits, alternative options, and potential complications associated with insertion of Peripherally Inserted Central Catheter (PICC) with the patient prior to the procedure.     Electronically signed by Carolina Pennington MD on 8/4/2021 at 3:12 PM     Carolina Pennington MD  3:09 PM  8/4/2021

## 2021-08-05 VITALS
TEMPERATURE: 97.8 F | HEIGHT: 62 IN | WEIGHT: 263.4 LBS | BODY MASS INDEX: 48.47 KG/M2 | HEART RATE: 73 BPM | DIASTOLIC BLOOD PRESSURE: 71 MMHG | SYSTOLIC BLOOD PRESSURE: 115 MMHG | RESPIRATION RATE: 16 BRPM | OXYGEN SATURATION: 94 %

## 2021-08-05 LAB
ANION GAP SERPL CALCULATED.3IONS-SCNC: 8 MMOL/L (ref 7–16)
ANISOCYTOSIS: ABNORMAL
ATYPICAL LYMPHOCYTE RELATIVE PERCENT: 0.9 % (ref 0–4)
BASOPHILS ABSOLUTE: 0 E9/L (ref 0–0.2)
BASOPHILS RELATIVE PERCENT: 0 % (ref 0–2)
BUN BLDV-MCNC: 4 MG/DL (ref 6–20)
CALCIUM SERPL-MCNC: 8.3 MG/DL (ref 8.6–10.2)
CHLORIDE BLD-SCNC: 101 MMOL/L (ref 98–107)
CO2: 27 MMOL/L (ref 22–29)
CREAT SERPL-MCNC: 0.5 MG/DL (ref 0.5–1)
EOSINOPHILS ABSOLUTE: 0.08 E9/L (ref 0.05–0.5)
EOSINOPHILS RELATIVE PERCENT: 0.9 % (ref 0–6)
GFR AFRICAN AMERICAN: >60
GFR NON-AFRICAN AMERICAN: >60 ML/MIN/1.73
GLUCOSE BLD-MCNC: 86 MG/DL (ref 74–99)
HCT VFR BLD CALC: 30.5 % (ref 34–48)
HEMOGLOBIN: 9.7 G/DL (ref 11.5–15.5)
HYPOCHROMIA: ABNORMAL
LYMPHOCYTES ABSOLUTE: 1.51 E9/L (ref 1.5–4)
LYMPHOCYTES RELATIVE PERCENT: 17.5 % (ref 20–42)
MCH RBC QN AUTO: 27.6 PG (ref 26–35)
MCHC RBC AUTO-ENTMCNC: 31.8 % (ref 32–34.5)
MCV RBC AUTO: 86.6 FL (ref 80–99.9)
MONOCYTES ABSOLUTE: 0.5 E9/L (ref 0.1–0.95)
MONOCYTES RELATIVE PERCENT: 6.1 % (ref 2–12)
NEUTROPHILS ABSOLUTE: 6.3 E9/L (ref 1.8–7.3)
NEUTROPHILS RELATIVE PERCENT: 74.6 % (ref 43–80)
NUCLEATED RED BLOOD CELLS: 0 /100 WBC
OVALOCYTES: ABNORMAL
PDW BLD-RTO: 13 FL (ref 11.5–15)
PLATELET # BLD: 317 E9/L (ref 130–450)
PMV BLD AUTO: 11.1 FL (ref 7–12)
POIKILOCYTES: ABNORMAL
POLYCHROMASIA: ABNORMAL
POTASSIUM REFLEX MAGNESIUM: 4 MMOL/L (ref 3.5–5)
RBC # BLD: 3.52 E12/L (ref 3.5–5.5)
SODIUM BLD-SCNC: 136 MMOL/L (ref 132–146)
T4 FREE: 1.11 NG/DL (ref 0.93–1.7)
THYROGLOBULIN ANTIBODY: 13 IU/ML (ref 0–40)
THYROID PEROXIDASE (TPO) ABS: <4 IU/ML (ref 0–25)
WBC # BLD: 8.4 E9/L (ref 4.5–11.5)

## 2021-08-05 PROCEDURE — 76937 US GUIDE VASCULAR ACCESS: CPT

## 2021-08-05 PROCEDURE — 2580000003 HC RX 258: Performed by: STUDENT IN AN ORGANIZED HEALTH CARE EDUCATION/TRAINING PROGRAM

## 2021-08-05 PROCEDURE — 2580000003 HC RX 258: Performed by: CLINICAL NURSE SPECIALIST

## 2021-08-05 PROCEDURE — 85025 COMPLETE CBC W/AUTO DIFF WBC: CPT

## 2021-08-05 PROCEDURE — 80048 BASIC METABOLIC PNL TOTAL CA: CPT

## 2021-08-05 PROCEDURE — 99232 SBSQ HOSP IP/OBS MODERATE 35: CPT | Performed by: INTERNAL MEDICINE

## 2021-08-05 PROCEDURE — 84439 ASSAY OF FREE THYROXINE: CPT

## 2021-08-05 PROCEDURE — 36415 COLL VENOUS BLD VENIPUNCTURE: CPT

## 2021-08-05 PROCEDURE — 02HV33Z INSERTION OF INFUSION DEVICE INTO SUPERIOR VENA CAVA, PERCUTANEOUS APPROACH: ICD-10-PCS | Performed by: SURGERY

## 2021-08-05 PROCEDURE — 36569 INSJ PICC 5 YR+ W/O IMAGING: CPT

## 2021-08-05 PROCEDURE — 6360000002 HC RX W HCPCS: Performed by: CLINICAL NURSE SPECIALIST

## 2021-08-05 PROCEDURE — 2500000003 HC RX 250 WO HCPCS: Performed by: STUDENT IN AN ORGANIZED HEALTH CARE EDUCATION/TRAINING PROGRAM

## 2021-08-05 PROCEDURE — 6370000000 HC RX 637 (ALT 250 FOR IP): Performed by: INTERNAL MEDICINE

## 2021-08-05 PROCEDURE — 6370000000 HC RX 637 (ALT 250 FOR IP): Performed by: SPECIALIST

## 2021-08-05 PROCEDURE — C1751 CATH, INF, PER/CENT/MIDLINE: HCPCS

## 2021-08-05 PROCEDURE — 6370000000 HC RX 637 (ALT 250 FOR IP): Performed by: STUDENT IN AN ORGANIZED HEALTH CARE EDUCATION/TRAINING PROGRAM

## 2021-08-05 RX ORDER — METHIMAZOLE 10 MG/1
TABLET ORAL
Qty: 90 TABLET | Refills: 5 | Status: SHIPPED | OUTPATIENT
Start: 2021-08-05

## 2021-08-05 RX ORDER — HEPARIN SODIUM (PORCINE) LOCK FLUSH IV SOLN 100 UNIT/ML 100 UNIT/ML
3 SOLUTION INTRAVENOUS PRN
Status: DISCONTINUED | OUTPATIENT
Start: 2021-08-05 | End: 2021-08-05 | Stop reason: HOSPADM

## 2021-08-05 RX ORDER — METHIMAZOLE 10 MG/1
TABLET ORAL
Qty: 90 TABLET | Refills: 5 | Status: SHIPPED | OUTPATIENT
Start: 2021-08-05 | End: 2021-08-05 | Stop reason: SDUPTHER

## 2021-08-05 RX ORDER — SODIUM CHLORIDE 0.9 % (FLUSH) 0.9 %
5-40 SYRINGE (ML) INJECTION EVERY 12 HOURS SCHEDULED
Status: DISCONTINUED | OUTPATIENT
Start: 2021-08-05 | End: 2021-08-05 | Stop reason: HOSPADM

## 2021-08-05 RX ORDER — HEPARIN SODIUM (PORCINE) LOCK FLUSH IV SOLN 100 UNIT/ML 100 UNIT/ML
3 SOLUTION INTRAVENOUS EVERY 12 HOURS SCHEDULED
Status: DISCONTINUED | OUTPATIENT
Start: 2021-08-05 | End: 2021-08-05 | Stop reason: HOSPADM

## 2021-08-05 RX ORDER — LIDOCAINE HYDROCHLORIDE 10 MG/ML
5 INJECTION, SOLUTION EPIDURAL; INFILTRATION; INTRACAUDAL; PERINEURAL ONCE
Status: COMPLETED | OUTPATIENT
Start: 2021-08-05 | End: 2021-08-05

## 2021-08-05 RX ORDER — SODIUM CHLORIDE 0.9 % (FLUSH) 0.9 %
5-40 SYRINGE (ML) INJECTION PRN
Status: DISCONTINUED | OUTPATIENT
Start: 2021-08-05 | End: 2021-08-05 | Stop reason: HOSPADM

## 2021-08-05 RX ORDER — SODIUM CHLORIDE 9 MG/ML
25 INJECTION, SOLUTION INTRAVENOUS PRN
Status: DISCONTINUED | OUTPATIENT
Start: 2021-08-05 | End: 2021-08-05 | Stop reason: SDUPTHER

## 2021-08-05 RX ORDER — OXYCODONE HYDROCHLORIDE 5 MG/1
5 TABLET ORAL EVERY 6 HOURS PRN
Qty: 20 TABLET | Refills: 0 | Status: SHIPPED | OUTPATIENT
Start: 2021-08-05 | End: 2021-08-10

## 2021-08-05 RX ADMIN — ACETAMINOPHEN 650 MG: 325 TABLET ORAL at 14:40

## 2021-08-05 RX ADMIN — ERTAPENEM SODIUM 1000 MG: 1 INJECTION, POWDER, LYOPHILIZED, FOR SOLUTION INTRAMUSCULAR; INTRAVENOUS at 13:03

## 2021-08-05 RX ADMIN — SODIUM CHLORIDE, PRESERVATIVE FREE 30 ML: 5 INJECTION INTRAVENOUS at 12:12

## 2021-08-05 RX ADMIN — FLUCONAZOLE 400 MG: 150 TABLET ORAL at 09:37

## 2021-08-05 RX ADMIN — ACETAMINOPHEN 650 MG: 325 TABLET ORAL at 09:37

## 2021-08-05 RX ADMIN — METHIMAZOLE 15 MG: 10 TABLET ORAL at 09:38

## 2021-08-05 RX ADMIN — OXYCODONE 5 MG: 5 TABLET ORAL at 14:40

## 2021-08-05 RX ADMIN — ACETAMINOPHEN 650 MG: 325 TABLET ORAL at 03:46

## 2021-08-05 RX ADMIN — OXYCODONE 5 MG: 5 TABLET ORAL at 03:46

## 2021-08-05 RX ADMIN — OXYCODONE 5 MG: 5 TABLET ORAL at 08:32

## 2021-08-05 RX ADMIN — LIDOCAINE HYDROCHLORIDE 1 ML: 10 INJECTION, SOLUTION EPIDURAL; INFILTRATION; INTRACAUDAL; PERINEURAL at 12:08

## 2021-08-05 ASSESSMENT — PAIN DESCRIPTION - ORIENTATION: ORIENTATION: RIGHT;LOWER

## 2021-08-05 ASSESSMENT — PAIN SCALES - GENERAL
PAINLEVEL_OUTOF10: 6
PAINLEVEL_OUTOF10: 6
PAINLEVEL_OUTOF10: 10
PAINLEVEL_OUTOF10: 6
PAINLEVEL_OUTOF10: 9

## 2021-08-05 ASSESSMENT — PAIN DESCRIPTION - LOCATION: LOCATION: ABDOMEN

## 2021-08-05 ASSESSMENT — PAIN DESCRIPTION - ONSET: ONSET: ON-GOING

## 2021-08-05 ASSESSMENT — PAIN DESCRIPTION - DESCRIPTORS: DESCRIPTORS: ACHING;DISCOMFORT

## 2021-08-05 ASSESSMENT — PAIN DESCRIPTION - FREQUENCY: FREQUENCY: CONTINUOUS

## 2021-08-05 ASSESSMENT — PAIN DESCRIPTION - PAIN TYPE: TYPE: ACUTE PAIN;SURGICAL PAIN

## 2021-08-05 ASSESSMENT — PAIN - FUNCTIONAL ASSESSMENT: PAIN_FUNCTIONAL_ASSESSMENT: PREVENTS OR INTERFERES SOME ACTIVE ACTIVITIES AND ADLS

## 2021-08-05 ASSESSMENT — PAIN DESCRIPTION - PROGRESSION: CLINICAL_PROGRESSION: NOT CHANGED

## 2021-08-05 NOTE — PROGRESS NOTES
ENDOCRINOLOGY PROGRESS NOTE    Date of Admission: 7/28/2021  Date of Service: 8/5/2021  Admitting Physician: Kassie Acosta MD   Primary Care Physician: Maryjo Woodruff DO  Consultant physician: Sreekanth Watkins MD     Reason for the consultation:  Hyperthyroidism     History of Present Illness: The history is provided by the patient. Accuracy of the patient data is excellent. Jodie Zamora is a very pleasant 22 y.o. old female with PMH obesity, hyperthyroidism admitted to [facility] on 7/28/2021 because of nausea vomiting and abdominal pain and found to have acute appendicitis, endocrine service was consulted for management of hyperthyroidism    Subjective:   Seen and examined, no acute issues overnight     Scheduled Meds:   sodium chloride flush  5-40 mL Intravenous 2 times per day    heparin flush  3 mL Intravenous 2 times per day    fluconazole  400 mg Oral Daily    methIMAzole  15 mg Oral BID    ertapenem (INVanz) IVPB  1,000 mg Intravenous Q24H    acetaminophen  650 mg Oral Q6H    sodium chloride flush  5-40 mL Intravenous 2 times per day     PRN Meds:   sodium chloride flush, 5-40 mL, PRN  heparin flush, 3 mL, PRN  HYDROmorphone, 0.5 mg, Q6H PRN   Or  HYDROmorphone, 1 mg, Q6H PRN  sodium chloride flush, 5-40 mL, PRN  sodium chloride, 25 mL, PRN  oxyCODONE, 5 mg, Q4H PRN  benzocaine-menthol, 1 lozenge, Q2H PRN  sodium chloride flush, 5-40 mL, PRN  sodium chloride, 25 mL, PRN  ondansetron, 4 mg, Q8H PRN   Or  ondansetron, 4 mg, Q6H PRN      Continuous Infusions:   sodium chloride      sodium chloride      lactated ringers 100 mL/hr at 08/01/21 2025       Review of Systems  All systems reviewed.  All negative except for symptoms mentioned in HPI     OBJECTIVE    /72   Pulse 76   Temp 98.2 °F (36.8 °C) (Oral)   Resp 16   Ht 5' 2\" (1.575 m)   Wt 263 lb 6.4 oz (119.5 kg)   LMP 07/22/2021   SpO2 96%   BMI 48.18 kg/m²   Wt Readings from Last 6 Encounters:   07/28/21 263 lb 6.4 oz (119.5 kg) 07/27/21 225 lb (102.1 kg)   07/27/21 225 lb (102.1 kg)   04/27/21 225 lb (102.1 kg)   12/16/20 212 lb (96.2 kg)   10/03/20 212 lb (96.2 kg)       Physical examination:  General: awake alert, oriented x3, no abnormal position or movements. HEENT: normocephalic non traumatic, no exophthalmos, no lid lag, no lid retraction   Neck: supple, no LN enlargement, + thyromegaly, Large Rt side nodule palpable, no thyroid tenderness, no thyroid bruit   Pulm: Clear equal air entry no added sounds, no wheezing or rhonchi    CVS: S1 + S2, no murmur, no heave. Dorsalis pedis pulse palpable   Abd: abdominal pain, s/p surgery   Skin: warm, no lesions, no rash.     Musculoskeletal: No back tenderness, no kyphosis/scoliosis    Neuro: CN intact, sensation notmal , muscle power normal. + tremors   Psych: normal mood, and affect    Review of Laboratory Data:  I personally reviewed the following labs:  Recent Labs     08/03/21 0408 08/04/21 0720 08/05/21  0240   WBC 12.0* 8.6 8.4   RBC 3.46* 3.37* 3.52   HGB 9.7* 9.4* 9.7*   HCT 30.2* 29.5* 30.5*   MCV 87.3 87.5 86.6   MCH 28.0 27.9 27.6   MCHC 32.1 31.9* 31.8*   RDW 13.2 13.0 13.0    264 317   MPV 11.2 11.4 11.1     Recent Labs     08/03/21 0408 08/04/21  0720 08/05/21  0240    135 136   K 4.0 4.0 4.0    101 101   CO2 28 27 27   BUN 10 6 4*   CREATININE 0.5 0.5 0.5   GLUCOSE 99 87 86   CALCIUM 8.1* 8.3* 8.3*     No results found for: BHYDRXBUT  Lab Results   Component Value Date    LABA1C 5.3 07/15/2019     Lab Results   Component Value Date/Time    TSH <0.010 (L) 07/30/2021 04:20 PM    T4FREE 1.11 08/05/2021 02:40 AM    B3NVRXV 10.7 07/30/2021 04:20 PM    FT3 16.0 (H) 02/27/2020 02:17 AM    FT3 3.9 11/01/2017 12:35 PM    H9AFIQQ 383.30 (H) 06/05/2019 01:50 PM    TSI <0.10 07/30/2021 04:20 PM    TPOABS <4.0 07/30/2021 04:20 PM    THGAB <0.9 06/05/2019 01:50 PM     Lab Results   Component Value Date    LABA1C 5.3 07/15/2019    GLUCOSE 86 08/05/2021     No results found for: TRIG, HDL, LDLCALC, CHOL    Blood culture   Lab Results   Component Value Date    BC 5 Days no growth 07/29/2021    BC 5 Days- no growth 02/16/2019     Medical Records/Labs/Images Review:   I personally reviewed and summarized previous records   All labs and imaging were reviewed independently    Thyroid US 8/1 --> large complex nodule in the Rt lobe measuring 6 cm       CHEPE & Carl Scottie, a 22 y.o.-old female seen in for management of Hyperthyroidism     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 Endocrine follow up Thursday 8/12 at 3:00PM   · Need TSH, free T4 Monday or Tuesday next week     Multinodular goiter   · US 8/1/2021 --> large 6 cm complex nodule in the Rt thyroid lobe   · Pt will need thyroid surgery after she recover from her current illness       Perforated appendicitis   · Management per surgery, and ID service     Tachycardia   · Resolved, off Propranolol  · Likely multifactorial hyperthyroidism, pain, fever    The above issues were reviewed with the patient who understood and agreed with the plan. Thank you for allowing us to participate in the care of this patient. Please do not hesitate to contact us with any additional questions. Mingo Ryan MD  Endocrinologist, [place] [address] [facility]   [address] [place] [address]   Phone: [phone]  Fax: [phone]  ---------------------------------  An electronic signature was used to authenticate this note.  Lashanda Sharma MD on 8/5/2021 at 1:25 PM

## 2021-08-05 NOTE — PROGRESS NOTES
[address] [place] [address] [facility]  Progress Note    SUBJECTIVE:  CC: abdominal pain     The patient is still having pain. She describes it as a 8 out of 10. She does, however, admit that the pain is mostly related to the pigtail catheter and MARY ANN drain. The intra-abdominal pain has improved. Tolerating antibiotics. Review of systems:  As stated above in the chief complaint, otherwise negative. Medications:  Scheduled Meds:   sodium chloride flush  5-40 mL Intravenous 2 times per day    heparin flush  3 mL Intravenous 2 times per day    fluconazole  400 mg Oral Daily    methIMAzole  15 mg Oral BID    ertapenem (INVanz) IVPB  1,000 mg Intravenous Q24H    acetaminophen  650 mg Oral Q6H    sodium chloride flush  5-40 mL Intravenous 2 times per day     Continuous Infusions:   sodium chloride      sodium chloride      lactated ringers 100 mL/hr at 21 2335     PRN Meds:sodium chloride flush, heparin flush, HYDROmorphone **OR** HYDROmorphone, sodium chloride flush, sodium chloride, oxyCODONE, benzocaine-menthol, sodium chloride flush, sodium chloride, ondansetron **OR** ondansetron    OBJECTIVE:  /72   Pulse 76   Temp 98.2 °F (36.8 °C) (Oral)   Resp 16   Ht 5' 2\" (1.575 m)   Wt 263 lb 6.4 oz (119.5 kg)   LMP 2021   SpO2 96%   BMI 48.18 kg/m²   Temp  Av.1 °F (36.7 °C)  Min: 96.9 °F (36.1 °C)  Max: 98.6 °F (37 °C)  Constitutional: The patient is lying in bed. No distress. Smiling. Mother present. Skin: Warm and dry. No rashes were noted. HEENT: Round and reactive pupils. Moist mucous membranes. No ulcerations or thrush. Neck: Supple to movements. Chest: No respiratory distress. Room air. Symmetrical expansion. Diminished bibasilar   Cardiovascular: S1 and S2 are rhythmic and regular. Abdomen: Positive bowel sounds to auscultation. Diffuse tenderness to palpation, especially on the right. Soft. Lap sites clean, closed with surgical glue.  MARY ANN drain with serous drainage. 2nd IR drain with purulent fluid. Extremities: trace edema BLE. Lines: Right PICC 8/5/2021.     Laboratory and Tests Review:  Lab Results   Component Value Date    WBC 8.4 08/05/2021    WBC 8.6 08/04/2021    WBC 12.0 (H) 08/03/2021    HGB 9.7 (L) 08/05/2021    HCT 30.5 (L) 08/05/2021    MCV 86.6 08/05/2021     08/05/2021     Lab Results   Component Value Date    NEUTROABS 6.30 08/05/2021    NEUTROABS 6.02 08/04/2021    NEUTROABS 9.36 (H) 08/03/2021     No results found for: CRPHS  Lab Results   Component Value Date    ALT 26 07/28/2021    AST 20 07/28/2021    ALKPHOS 172 (H) 07/28/2021    BILITOT 1.1 07/28/2021     Lab Results   Component Value Date     08/05/2021    K 4.0 08/05/2021     08/05/2021    CO2 27 08/05/2021    BUN 4 08/05/2021    CREATININE 0.5 08/05/2021    CREATININE 0.5 08/04/2021    CREATININE 0.5 08/03/2021    GFRAA >60 08/05/2021    LABGLOM >60 08/05/2021    GLUCOSE 86 08/05/2021    PROT 7.8 07/28/2021    LABALBU 3.8 07/28/2021    CALCIUM 8.3 08/05/2021    BILITOT 1.1 07/28/2021    ALKPHOS 172 07/28/2021    AST 20 07/28/2021    ALT 26 07/28/2021     Lab Results   Component Value Date    CRP 12.1 (H) 07/28/2021     Lab Results   Component Value Date    SEDRATE 25 (H) 07/28/2021     Radiology:        Microbiology:   7/28/2021- gram stain- body fluid- E.coli x 2 morphologies   No anaerobes   Blood Culture- negative   MRSA screen- not isolated  Fungitell - negative   RLQ abscess fluid culture 8/2/2021: E. coli x2 (1 is pansensitive and the other one is Levofloxacin and Ampicillin resistant), anaerobic GNR    ASSESSMENT:  · Acute appendicitis   · S/p attempted lap appendectomy with drain placement   · Leukocytosis associated to appendicitis, no change    · Fevers - low grade, improving   · Status post CT guided aspiration and drain placement in RLQ abdominal abscess 8/2/2021: Cultures growing fairly sensitive E. coli and anaerobes    PLAN:  · Continue Ertapenem & Fluconzaole for a minimum of 3 weeks or until the abscess has resolved  · The patient can be discharged from ID standpoint  · Instructed to call the office and make a follow-up appointment    Spoke with nursing    Shahana Long MD  4:08 PM  8/5/2021

## 2021-08-05 NOTE — PROGRESS NOTES
Called Dr. Jose Luis Gaspar per ROXANNE Giron  Reason Script needs signed and is Patient being Discharged?

## 2021-08-05 NOTE — PROCEDURES
PICC    Catheter insertion date: 8/5/2021     Product Number:  [id number]   Lot No: [id number]   Gauge: 4.5FR   Lumen: SINGLE   R Basilic    Vein Diameter : 0.46CM   Mid upper arm circumference: 42CM   Catheter Length : 38CM   Internal Length: 37CM   External Catheter Length: 1CM   Ultrasound Used:YES  MARY Carmene confirms PICC tip is placed in the lower 1/3 of the SVC or at the Cavoatrial junction. Floor nurse notified PICC is okay to use.    : Francie Dolan RN, CPUI, [facility]

## 2021-08-05 NOTE — PROGRESS NOTES
GENERAL SURGERY  DAILY PROGRESS NOTE  8/5/2021    CHIEF COMPLAINT:  No chief complaint on file. SUBJECTIVE:  Patient states pain is unchanged from yesterday. Tolerating food without N/V. No other complaints at this time Draining purulent fluid. MARY ANN serous. Tolerating diet -- ADULT DIET; Regular; Low Fiber    OBJECTIVE:  BP (!) 121/55   Pulse 87   Temp 98.6 °F (37 °C) (Oral)   Resp 16   Ht 5' 2\" (1.575 m)   Wt 263 lb 6.4 oz (119.5 kg)   LMP 07/22/2021   SpO2 96%   BMI 48.18 kg/m²     GENERAL:  NAD. A&Ox3. LUNGS:  No increased work of breathing. CARDIOVASCULAR: RR  ABDOMEN:  Soft, non-distended, non-tender. ASSESSMENT/PLAN:  22 y.o. female with perforated appendicitis and intraabdominal abscess s/p RLQ IR drain placed 8/2.    - no further management from surgical standpoint ok for discharge    Buddy Wallis MD  Surgery Resident PGY-1  8/5/2021  7:15 AM    I saw and examined the patient. I reviewed the above resident's note. I agree with the assessment and plan as outlined. Omar Marroquin MD  General Surgery    Discharge today.

## 2021-08-05 NOTE — PROGRESS NOTES
[facility]   Progress Note    Admitting Date and Time: 7/28/2021  5:03 AM  Admit Dx: Acute appendicitis [K35.80]    Subjective:    Patient was admitted with Acute appendicitis [K35.80]. Feeling better today. Getting PICC at time of interview. Some mild abdominal pain but improving. Tolerating diet. No fever. No tachycardia. ROS: denies fever, chills, cp, sob, n/v, HA unless stated above.  sodium chloride flush  5-40 mL Intravenous 2 times per day    heparin flush  3 mL Intravenous 2 times per day    fluconazole  400 mg Oral Daily    methIMAzole  15 mg Oral BID    ertapenem (INVanz) IVPB  1,000 mg Intravenous Q24H    acetaminophen  650 mg Oral Q6H    sodium chloride flush  5-40 mL Intravenous 2 times per day     sodium chloride flush, 5-40 mL, PRN  heparin flush, 3 mL, PRN  HYDROmorphone, 0.5 mg, Q6H PRN   Or  HYDROmorphone, 1 mg, Q6H PRN  sodium chloride flush, 5-40 mL, PRN  sodium chloride, 25 mL, PRN  oxyCODONE, 5 mg, Q4H PRN  benzocaine-menthol, 1 lozenge, Q2H PRN  sodium chloride flush, 5-40 mL, PRN  sodium chloride, 25 mL, PRN  ondansetron, 4 mg, Q8H PRN   Or  ondansetron, 4 mg, Q6H PRN         Objective:    /72   Pulse 76   Temp 98.2 °F (36.8 °C) (Oral)   Resp 16   Ht 5' 2\" (1.575 m)   Wt 263 lb 6.4 oz (119.5 kg)   LMP 07/22/2021   SpO2 96%   BMI 48.18 kg/m²   General Appearance: alert and oriented to person, place and time, well developed and well- nourished, in no acute distress  Skin: warm and dry, no rash or erythema  Head: normocephalic and atraumatic  Neck: supple and non-tender   Pulmonary/Chest: clear bilaterally, no crackles of wheezes  Cardiovascular: normal rate, regular rhythm, normal S1 and S2, no murmurs  Abdomen: obese, soft, minimal uncomfortableness - 2 drains, MARY ANN and larger tube.   Dark brown thin minimal drainage for both  Extremities: no edema      Recent Labs     08/03/21  0408 08/04/21  0720 08/05/21  0240    135 136   K 4.0 4.0 4.0    101 101   CO2 28 27 27   BUN 10 6 4*   CREATININE 0.5 0.5 0.5   GLUCOSE 99 87 86   CALCIUM 8.1* 8.3* 8.3*       Recent Labs     08/03/21  0408 08/04/21  0720 08/05/21  0240   WBC 12.0* 8.6 8.4   RBC 3.46* 3.37* 3.52   HGB 9.7* 9.4* 9.7*   HCT 30.2* 29.5* 30.5*   MCV 87.3 87.5 86.6   MCH 28.0 27.9 27.6   MCHC 32.1 31.9* 31.8*   RDW 13.2 13.0 13.0    264 317   MPV 11.2 11.4 11.1       Radiology:   CT ABSCESS DRAINAGE W CATH PLACEMENT S&I   Final Result   CT guided right lower quadrant abscess catheter insertion. US THYROID   Final Result   Right thyroid lobe dominant mass measuring up to 6 cm which is increased in   size compared to earlier exam and occupies the majority of the right thyroid   lobe. This represents a TR3 mass and FNA biopsy is recommended if not   already performed. ACR TI-RADS recommendations:      TR5 (>= 7 points):  FNA if >= 1 cm; follow-up if 0.5-0.9 cm in 1, 2, 3, 4,   and 5 years      TR4 (4-6 points):  FNA if >= 1.5 cm; follow-up if 1.0-1.4 cm in 1, 2, 3, and   5 years      TR3 (3 points):  FNA if >= 2.5 cm; follow-up if 1.5-2.4 cm in 1, 3, and 5   years      TR2 (2 points):  No FNA or follow-up      TR1 (0 points):  No FNA or follow-up      The findings were sent to the Radiology Results [address] at 8:10   am on 8/2/2021to be communicated to a licensed caregiver. RECOMMENDATIONS:   Right thyroid lobe FNA biopsy. CT ABDOMEN PELVIS W IV CONTRAST Additional Contrast? None   Final Result   1. After recent the laparoscopic procedure to the right lower quadrant.       2.  Significant inflammatory process in the reaction is present the around   the cecum extending into the retrocecal area dissecting through   retroperitoneal spaces and into adjacent omental/mesentery fat planes along   the medial aspect of the ascending colon and the around and inferior to the   cecal area with amorphous fluid accumulation in the retrocecal/infra cecal   area covering an area of a 5.4 x 3.9 x 7 cm. 3.  The extensive a reactive a retroperitoneal and mesenteric adenopathy seen   around the and posterior to the cecum. There is also a it evidence for edema   of the cecum and of the terminal ileum. 4.  In the retrocecal area there is a amorphous calcification which is   similar as observed in the pre operative study in the region of the appendix   which is relate with a fusiform shaped structure which is ill-defined which   connects with cecum, these represent segments of the appendix if not the   entirely appendix, with areas indicative of appendiceal perforation. 5.  The right surgical catheter is looping inferiorly to the cecum. CT TUBE CATHETER FOLLOW UP    (Results Pending)       Assessment:    Principal Problem:    Acute appendicitis  Active Problems:    Sinus tachycardia  Resolved Problems:    * No resolved hospital problems. *      Plan:    1. Perforated appendicitis and intra-abdominal abscess  Doing well now. 2 drains in place with thin brown drainage. On ertapenem geovanny fluconazole iv per ID  PICC placed. At least 3 weeks iv antibiotics. Seems to be OK from surgical point of view for discharge today. Medically stable for discharge. 2.  Toxic multinodular goiter with hyperthyroidism  Followed by endocrinology. Was on propranolol, since stopped. Not tachycardic    To continue methimazole 15 mg bid on discharge. Has complex nodule as well to be followed by endocrine. No other medical issues. Anticipate discharge today.     Electronically signed by Sam Syed MD on 8/5/2021 at 1:56 PM

## 2021-08-07 LAB
BODY FLUID CULTURE, STERILE: ABNORMAL
GRAM STAIN RESULT: ABNORMAL
ORGANISM: ABNORMAL

## 2021-08-08 LAB
ANAEROBIC CULTURE: ABNORMAL
ORGANISM: ABNORMAL

## 2021-08-10 NOTE — DISCHARGE SUMMARY
Physician Discharge Summary     Patient ID:  Serina Barajas  [id number]  44 y.o.  1995    Admit date: 7/28/2021    Discharge date and time: 8/5/2021  5:36 PM     Admitting Physician: Carlos Manuel Lincoln MD     Admission Diagnoses: Acute appendicitis [K35.80]    Discharge Diagnoses: Principal Problem:    Acute appendicitis  Active Problems:    Sinus tachycardia  Resolved Problems:    * No resolved hospital problems. *      Admission Condition: fair    Discharged Condition: stable    Indication for Admission: Acute appendicitis     Hospital Course/Procedures/Operation/treatments:  presented with RLQ pain and vomiting for a day, was found to have acute appendicitis. Pt underwent laparoscopic appendectomy with abdominal. Washout and drain placement. Patient had appropriate pain following procedure, diet was slowly advanced as tolerated and return of bowel function was monitored until achieved. By 8/5 pain was well controlled, patient was tolerating regular diet and had bowel function. MARY ANN drain was serous and she was deemed stable for discharge. Consults:   IP CONSULT TO INFECTIOUS DISEASES  IP CONSULT TO IV TEAM  IP CONSULT TO FAMILY MEDICINE  IP CONSULT TO ENDOCRINOLOGY  IP CONSULT TO IV TEAM  IP CONSULT TO IV TEAM  IP CONSULT TO IV TEAM    Significant Diagnostic Studies:   CT ABDOMEN PELVIS W IV CONTRAST Additional Contrast? None    Result Date: 7/28/2021  EXAMINATION: CT OF THE ABDOMEN AND PELVIS WITH CONTRAST 7/28/2021 12:20 am TECHNIQUE: CT of the abdomen and pelvis was performed with the administration of intravenous contrast. Multiplanar reformatted images are provided for review. Dose modulation, iterative reconstruction, and/or weight based adjustment of the mA/kV was utilized to reduce the radiation dose to as low as reasonably achievable.  COMPARISON: 10/27/2019 HISTORY: ORDERING SYSTEM PROVIDED HISTORY: abd pain/leukocytosis TECHNOLOGIST PROVIDED HISTORY: Additional Contrast?->None Reason for exam:->abd pain/leukocytosis Decision Support Exception - unselect if not a suspected or confirmed emergency medical condition->Emergency Medical Condition (MA) FINDINGS: Lower Chest: Lung bases clear. A very small hiatal hernia. Organs: Unremarkable liver, spleen, pancreas, and adrenals. GI/Bowel: No definite cholelithiasis. Enlarged appendix measuring up to 1.2 cm in diameter and containing appendicoliths. Marked surrounding fat stranding. Findings consistent with acute appendicitis. Pelvis: No adnexal mass. Incompletely distended but grossly unremarkable urinary bladder. Peritoneum/Retroperitoneum: Prominent mesenteric lymph nodes on the right side of the abdomen, most likely reactive. No suspicious adenopathy in the abdomen and pelvis. No free air or free fluid. No abdominal or pelvic abscess. Intact abdominal aorta and its major branches. Bones/Soft Tissues: Intact osseous structures     Acute appendicitis. No free air or periappendiceal abscess. I discussed the findings by phone with Dr. Caleb Morrell at 1:27 am on 7/28/2021. Discharge Exam:  GENERAL:  NAD. A&Ox3. LUNGS:  No increased work of breathing. CARDIOVASCULAR: RR  ABDOMEN:  Soft, non-distended, non-tender. Disposition: home    In process/preliminary results:  Outstanding Order Results     Date and Time Order Name Status Description    7/28/2021  7:23 AM Culture with Smear, Acid Fast Bacillius Preliminary     7/28/2021  7:23 AM Culture, Fungus Preliminary           Patient Instructions:   Discharge Medication List as of 8/6/2021  7:20 AM           Details   oxyCODONE (ROXICODONE) 5 MG immediate release tablet Take 1 tablet by mouth every 6 hours as needed for Pain for up to 5 days. , Disp-20 tablet, R-0Print      ertapenem (INVANZ) infusion Infuse 1,000 mg intravenously every 24 hours for 21 days Compound per protocol., Disp-62299 mg, R-0Print              Details   methIMAzole (TAPAZOLE) 10 MG tablet Take 1.5 tablet (15 mg) twice a day, Disp-90 tablet, R-5Normal             Pour 0.9 NaCL solution into sterile specimen cup.  Use new cup everyday  Draw up 10 mL of fluid into syringe.  New syringe for each flush. Avoid touching the inside of the cup, mouth of the bottle, tip of the syringe or tip of white cap as these areas are to be kept sterile.  Change white cap as necessary.  Tube is to be flushed  daily  Remove cap from side port and connect syringe by twisting motion  Turn stopcock off towards bag. (This allows direct flow between catheter in patient and bag.)  Inject the solution brisky. Pull fluid back slowly but firmly into the syringe  Measure amount of fluid and observe what fluid looks like.  Document at completion of this procedure. Turn stopcock off towards side port. (This allows direct flow between catheter in patient and bag)  Remove the syringe and replace cap over side port. Use new syringe each time the flushing procedure is done  Use sterile cup each day  Clear plastic cover is to be used when showering or bathing, not for daily continuous wear  Secure prescription for follow up tube check. Follow up with [facility] appointment for dressing change and irrigation. Notify radiology of any problems [phone].    =============================================================    [address] [place] [address] [facility]  ([facility] [address])  [address] [place] [address], [address] [place] [address]  Phone [phone]   Fax [phone]    Deborah Lauren MD, MD Chago Pham MD Munir P. [place], MD Jesusa Key, MD Steele Duos, MD General Hunter, RN, SHANON Galdamez, JUNIOR Todd, NANDA Velasquez, JUNIOR Resendez CNP               STANDING ORDERS (ID Protocol)     1. Visiting nurses are to write the Primary Care Physician and their own call back number on all laboratory requisition forms.    a. Abnormal lab values are called to the physician by the nurse and NOT by the laboratory. b. Fax all labs to the office in a timely manner, during office hours. All faxes should include nurses name and call back number. 2. Vascular Access Devices or VADs (TLC, PICC, Midline, etc) will be replaced as necessary. a. Draw all blood work from VADs, except for drug levels. b. If unable to access a VAD, insert a peripheral catheter temporarily. Contact the Primary Care Physician or [facility] office for surgical referral.  c. Use tPA (Franciso Ladelli) as per agency protocol to restore patency of VAD. d. Remove VAD upon completion of IV antibiotics, unless otherwise specified by the ordering physician.  i. If VAD cannot be removed, schedule appointment at office for removal.  3. Notify ordering physician or office if patient requires admission to the hospital with reason for admission. 4. Discontinue all blood work upon completion of IV antibiotics, unless otherwise specified by ordering physician. 5. Notify ordering physician if the patient does not receive the scheduled antibiotic for 24 hours or more. 6. The Pharmacy and [address] [place] [address] may adjust the timing of the infusion and blood work to accommodate the patients home care conditions. 7. When PICC or VAD is to be removed, documentation of length of inserted PICC. PICC or VAD length is to correlate with inserted length and sent to physician at the time of removal.  8. Give the patient a list of antibiotics being administered with:  a. Drug name  b. Route  c. Frequency  d. Start/Stop date      ROUTINE LABS TO BE DRAWN/ORDERED:  1. Twice weekly (preferably every Monday & Thursday):  a. BUN, Ceatinine  b. Complete Blood Count with differential (CBC with dif)  2. Once weekly:  a. C-Reactive Protein (not high sensitivity CRP)  b. Erythrocyte/Westergren Sedimentation Rate (WSR or ESR)  c. Total CPK for patients on Daptomycin (Cubicin®).  Obtain CPK more often if the patient is experiencing muscle weakness or myalgias. 3. Clinical Pharmacist is to adjust Vancomycin and Aminoglycosides unless otherwise indicated. a. Draw Vancomycin trough 30 minutes before the third dose  i. After starting drug, or   ii. After the dosing or interval is changed. 1. If the trough level is between 5 and 20 continue dose as ordered. b. Draw troughs twice weekly thereafter until troughs are stable (i.e. until trough is between 10 and 20 mcg/ml for two consecutive laboratory values). i. Once stable check troughs once weekly or every third dose. c. Please do not call physician unless the trough is < 5 or >20.  i. If the trough is <20 continue dosing as ordered. ii. If the trough is >20 call the office for further orders. Do not hold the dose while waiting for the trough result. 4. Amingoglycosides (e.g. Gentamicin, Tobramycin and Amikacin) peaks and troughs should be drawn twice weekly (preferably on Mondays and Thursdays) or every third dose. a. Aminoglycoside peaks are not to be drawn if patient on Once-Daily Dosing (ODD). b. Call physician or office if the trough is:  i. >1 for gentamicin,   ii. >2 for tobramycin, or   iii. >5 for amikacin  5. When clinically indicated obtain:  a. Urine culture. If the patient has a fever with purulent drainage from Munoz or suprapubic catheter, or foul smelling urine. Do not irrigate a clogged Munoz catheter. Replace it.  b. Blood cultures and Wound Gram stain with culture & sensitivity. If the patient has a fever or increasing drainage or foul odor from a wound. Notify the treating physician in a timely manner  c. Stool specimen. If diarrhea occurs while on antibiotics, send stools for C. difficile and WBCs. 6. When a drug is discontinued due to a low white blood cell count (WBCs) draw two consecutive CBC with differential and BUN, Ceatinine. ALLERGIC OR ADVERSE REACTIONS TO MEDICATIONS  1. Mild reaction: (itching, with or without rash):  a.  Administer Benadryl 50mg po x 1, then 25mg po q6h prn.   b. Notify office or physician in a timely matter. 2. Moderate reaction (itching with or without rash and/or wheezing, dyspnea, itchy throat):  a. Administer Benadryl 50 mg IV push x 1.   b. Notify office or physician in a timely manner. 3. Severe reaction i.e. Anaphylaxis (wheezing or stidor, sudden rash, lightheadedness, hypotension):  a. Administer epinephrine subcutaneous 0.3mg (1:1000) x 1 dose. b. May repeat twice every 5 minutes if needed. c. Call EMS and notify office or physician immediately. 4. For all above reactions: administer Solu-Cortef 100mg slow IV push x 1. VASCULAR ACCESS DRESSING CHANGE PROTOCOL  1. Cleanse insertion site with ChlorPrep® or equivalent three times. 2. Secure catheter with Steri-Strips®, Bone®, or equivalent securing device. 3. Apply Opsite® 3000 or equivalent transparent dressing. 4. Change dressing twice weekly, maintaining sterile technique. If there is a BioPatch®, SilverSite® or equivalent, change once weekly only or as needed. FOLLOW-UP VISITS  1. Nursing staff should call the office during business hours to schedule a follow-up appointment once the patient is admitted to the service or facility. Every effort should be made to have patient follow-up within 2 weeks of discharge. Exception is made for ventilator-dependent patients. 2. Continue IV antibiotic therapy until patient is seen in the office or unless specific stop date is noted on the original order or unless otherwise ordered by physician. 3. Call office to ensure stop date is correct before stopping antibiotics.         Neptali Triana MD          Follow up:   Curly Minor  [address], [address] [facility] [address] [facility] [address]          [facility], [address] [place] [address] [phone]    Schedule an appointment as soon as possible for a visit in 2 weeks  For follow-up    Alejandro Bardales MD  0 [facility] [address]    In 2 weeks      Emigdio Diaz, [address]  [address] [facility],[address] [address] [phone]    On 8/12/2021  Endocrnie follow up visit, Thursday 8/12 at 3:00PM        Signed:  Effie Whiting MD  8/10/2021  8:01 AM

## 2021-08-13 ENCOUNTER — HOSPITAL ENCOUNTER (OUTPATIENT)
Dept: CT IMAGING | Age: 26
Discharge: HOME | End: 2021-08-15
Payer: COMMERCIAL

## 2021-08-13 DIAGNOSIS — K35.32 ACUTE APPENDICITIS WITH PERFORATION AND LOCALIZED PERITONITIS, UNSPECIFIED WHETHER ABSCESS PRESENT, UNSPECIFIED WHETHER GANGRENE PRESENT: ICD-10-CM

## 2021-08-13 PROCEDURE — 76000 FLUOROSCOPY <1 HR PHYS/QHP: CPT

## 2021-08-13 NOTE — PROGRESS NOTES
Patient arrived to radiology for tube check with fluoroscopy/CT imaging. Site appears pink with 25 cc milky strawberry amount of drainage noted. 10% contrast injected, images taken and reviewed by . Site cleansed and M-Fixx dressing applied after tube discontinued. Site pink . Patient tolerated procedure well.

## 2021-08-16 ENCOUNTER — OFFICE VISIT (OUTPATIENT)
Dept: SURGERY | Age: 26
End: 2021-08-16

## 2021-08-16 VITALS
SYSTOLIC BLOOD PRESSURE: 90 MMHG | WEIGHT: 263 LBS | HEIGHT: 63 IN | HEART RATE: 114 BPM | TEMPERATURE: 97.2 F | BODY MASS INDEX: 46.6 KG/M2 | DIASTOLIC BLOOD PRESSURE: 64 MMHG

## 2021-08-16 DIAGNOSIS — K35.32 ACUTE APPENDICITIS WITH PERFORATION AND LOCALIZED PERITONITIS, UNSPECIFIED WHETHER ABSCESS PRESENT, UNSPECIFIED WHETHER GANGRENE PRESENT: Primary | ICD-10-CM

## 2021-08-16 LAB
ALBUMIN SERPL-MCNC: 3.4 G/DL (ref 3.5–5.2)
ALP BLD-CCNC: 148 U/L (ref 35–104)
ALT SERPL-CCNC: 55 U/L (ref 0–32)
ANION GAP SERPL CALCULATED.3IONS-SCNC: 10 MMOL/L (ref 7–16)
AST SERPL-CCNC: 43 U/L (ref 0–31)
BASOPHILS ABSOLUTE: 0.02 E9/L (ref 0–0.2)
BASOPHILS RELATIVE PERCENT: 0.4 % (ref 0–2)
BILIRUB SERPL-MCNC: 0.5 MG/DL (ref 0–1.2)
BUN BLDV-MCNC: 14 MG/DL (ref 6–20)
C-REACTIVE PROTEIN: 1.5 MG/DL (ref 0–0.4)
CALCIUM SERPL-MCNC: 9.4 MG/DL (ref 8.6–10.2)
CHLORIDE BLD-SCNC: 102 MMOL/L (ref 98–107)
CO2: 24 MMOL/L (ref 22–29)
CREAT SERPL-MCNC: 0.4 MG/DL (ref 0.5–1)
EOSINOPHILS ABSOLUTE: 0.12 E9/L (ref 0.05–0.5)
EOSINOPHILS RELATIVE PERCENT: 2.2 % (ref 0–6)
GFR AFRICAN AMERICAN: >60
GFR NON-AFRICAN AMERICAN: >60 ML/MIN/1.73
GLUCOSE BLD-MCNC: 99 MG/DL (ref 74–99)
HCT VFR BLD CALC: 34.6 % (ref 34–48)
HEMOGLOBIN: 11.1 G/DL (ref 11.5–15.5)
IMMATURE GRANULOCYTES #: 0.01 E9/L
IMMATURE GRANULOCYTES %: 0.2 % (ref 0–5)
LYMPHOCYTES ABSOLUTE: 1.71 E9/L (ref 1.5–4)
LYMPHOCYTES RELATIVE PERCENT: 31 % (ref 20–42)
MCH RBC QN AUTO: 28.1 PG (ref 26–35)
MCHC RBC AUTO-ENTMCNC: 32.1 % (ref 32–34.5)
MCV RBC AUTO: 87.6 FL (ref 80–99.9)
MONOCYTES ABSOLUTE: 0.61 E9/L (ref 0.1–0.95)
MONOCYTES RELATIVE PERCENT: 11.1 % (ref 2–12)
NEUTROPHILS ABSOLUTE: 3.04 E9/L (ref 1.8–7.3)
NEUTROPHILS RELATIVE PERCENT: 55.1 % (ref 43–80)
PDW BLD-RTO: 12.9 FL (ref 11.5–15)
PLATELET # BLD: 379 E9/L (ref 130–450)
PMV BLD AUTO: 10.4 FL (ref 7–12)
POTASSIUM SERPL-SCNC: 4.3 MMOL/L (ref 3.5–5)
RBC # BLD: 3.95 E12/L (ref 3.5–5.5)
SEDIMENTATION RATE, ERYTHROCYTE: 30 MM/HR (ref 0–20)
SODIUM BLD-SCNC: 136 MMOL/L (ref 132–146)
TOTAL PROTEIN: 7.4 G/DL (ref 6.4–8.3)
WBC # BLD: 5.5 E9/L (ref 4.5–11.5)

## 2021-08-16 PROCEDURE — 99024 POSTOP FOLLOW-UP VISIT: CPT | Performed by: SURGERY

## 2021-08-16 RX ORDER — ACETAMINOPHEN 500 MG
500 TABLET ORAL EVERY 6 HOURS PRN
COMMUNITY

## 2021-08-16 NOTE — PROGRESS NOTES
Progress Note - Post-op follow up     Patient's Name/Date of Birth: Serina Barajas / 1995    Date: 8/16/2021    PCP: Pipe Spencer DO    Referring Physician:   Rupert Layton    Chief Complaint   Patient presents with    Post-Op Check     appendectomy lap       Subjective:  Patient presents to the office following diagnostic lap and washout for perforated appendicitis. The patient is tolerating a regular diet. Denies n/v/d/c. Pain controlled with pain medication. Her IR drain was pulled last week. She is still on IV antibiotics. Patient's medications, allergies, past medical, surgical, social and family histories were reviewed and updated as appropriate.     Physical Exam:  BP 90/64   Pulse 114   Temp 97.2 °F (36.2 °C) (Temporal)   Ht 5' 3\" (1.6 m)   Wt 263 lb (119.3 kg)   LMP 07/22/2021   BMI 46.59 kg/m²   General Appearance: NAD Abdomen: soft, non-distended mild incisional tenderness, no rebound or guarding, incision C/D/I    Data Reviewed: Pathology reviewed with patient    Assessment/Plan:    22y.o. year old female s/p diagnostic laparoscopy with washout for perforated appendicitis    Patient recovering well from surgery  Wound care discussed  Follow up 3 months to discuss possible interval appendectomy (had appendicolith)    Carlos Manuel Lincoln MD 8/16/2021 3:27 PM     Send copy of H&P to PCP, Pipe Spencer DO and referring physician, Michael Jalloh DO

## 2021-08-19 LAB
ALBUMIN SERPL-MCNC: 3.6 G/DL (ref 3.5–5.2)
ALP BLD-CCNC: 170 U/L (ref 35–104)
ALT SERPL-CCNC: 78 U/L (ref 0–32)
ANION GAP SERPL CALCULATED.3IONS-SCNC: 10 MMOL/L (ref 7–16)
AST SERPL-CCNC: 78 U/L (ref 0–31)
BASOPHILS ABSOLUTE: 0.02 E9/L (ref 0–0.2)
BASOPHILS RELATIVE PERCENT: 0.5 % (ref 0–2)
BILIRUB SERPL-MCNC: 1.3 MG/DL (ref 0–1.2)
BUN BLDV-MCNC: 14 MG/DL (ref 6–20)
CALCIUM SERPL-MCNC: 9.8 MG/DL (ref 8.6–10.2)
CHLORIDE BLD-SCNC: 104 MMOL/L (ref 98–107)
CO2: 26 MMOL/L (ref 22–29)
CREAT SERPL-MCNC: 0.4 MG/DL (ref 0.5–1)
EOSINOPHILS ABSOLUTE: 0.08 E9/L (ref 0.05–0.5)
EOSINOPHILS RELATIVE PERCENT: 1.8 % (ref 0–6)
GFR AFRICAN AMERICAN: >60
GFR NON-AFRICAN AMERICAN: >60 ML/MIN/1.73
GLUCOSE BLD-MCNC: 90 MG/DL (ref 74–99)
HCT VFR BLD CALC: 35 % (ref 34–48)
HEMOGLOBIN: 11.5 G/DL (ref 11.5–15.5)
IMMATURE GRANULOCYTES #: 0.01 E9/L
IMMATURE GRANULOCYTES %: 0.2 % (ref 0–5)
LYMPHOCYTES ABSOLUTE: 1.66 E9/L (ref 1.5–4)
LYMPHOCYTES RELATIVE PERCENT: 37.9 % (ref 20–42)
MCH RBC QN AUTO: 28.2 PG (ref 26–35)
MCHC RBC AUTO-ENTMCNC: 32.9 % (ref 32–34.5)
MCV RBC AUTO: 85.8 FL (ref 80–99.9)
MONOCYTES ABSOLUTE: 0.5 E9/L (ref 0.1–0.95)
MONOCYTES RELATIVE PERCENT: 11.4 % (ref 2–12)
NEUTROPHILS ABSOLUTE: 2.11 E9/L (ref 1.8–7.3)
NEUTROPHILS RELATIVE PERCENT: 48.2 % (ref 43–80)
PDW BLD-RTO: 12.7 FL (ref 11.5–15)
PLATELET # BLD: 302 E9/L (ref 130–450)
PMV BLD AUTO: 11.3 FL (ref 7–12)
POTASSIUM SERPL-SCNC: 4.3 MMOL/L (ref 3.5–5)
RBC # BLD: 4.08 E12/L (ref 3.5–5.5)
SODIUM BLD-SCNC: 140 MMOL/L (ref 132–146)
TOTAL PROTEIN: 7.8 G/DL (ref 6.4–8.3)
WBC # BLD: 4.4 E9/L (ref 4.5–11.5)

## 2021-08-30 LAB
FUNGUS (MYCOLOGY) CULTURE: NORMAL
FUNGUS STAIN: NORMAL

## 2021-09-08 ENCOUNTER — OFFICE VISIT (OUTPATIENT)
Dept: PRIMARY CARE CLINIC | Age: 26
End: 2021-09-08
Payer: COMMERCIAL

## 2021-09-08 VITALS
WEIGHT: 263 LBS | SYSTOLIC BLOOD PRESSURE: 126 MMHG | TEMPERATURE: 97.8 F | DIASTOLIC BLOOD PRESSURE: 72 MMHG | OXYGEN SATURATION: 97 % | HEIGHT: 63 IN | HEART RATE: 115 BPM | BODY MASS INDEX: 46.6 KG/M2

## 2021-09-08 DIAGNOSIS — Z20.822 SUSPECTED COVID-19 VIRUS INFECTION: Primary | ICD-10-CM

## 2021-09-08 PROCEDURE — 1036F TOBACCO NON-USER: CPT | Performed by: NURSE PRACTITIONER

## 2021-09-08 PROCEDURE — 99213 OFFICE O/P EST LOW 20 MIN: CPT | Performed by: NURSE PRACTITIONER

## 2021-09-08 PROCEDURE — G8427 DOCREV CUR MEDS BY ELIG CLIN: HCPCS | Performed by: NURSE PRACTITIONER

## 2021-09-08 PROCEDURE — G8417 CALC BMI ABV UP PARAM F/U: HCPCS | Performed by: NURSE PRACTITIONER

## 2021-09-08 NOTE — PROGRESS NOTES
Supple. no anterior cervical adenopathy noted. Lungs: CTAB without wheezes, rales, or rhonchi. CV:  Regular rate and rhythm, normal heart sounds, without pathological murmurs, ectopy, gallops, or rubs. Skin:  Normal turgor. Warm, dry, without visible rash. Lymphatic: No lymphangitis or adenopathy noted. Neurological:  Oriented. Motor functions intact. Lab / Imaging Results   (All laboratory and radiology results have been personally reviewed by myself)  Labs:  No results found for this visit on 09/08/21. Imaging: All Radiology results interpreted by Radiologist unless otherwise noted. No results found. Medical Decision Making   Pt non-toxic, in no apparent distress and stable at time of discharge. Assessment/Plan   Diego Tavera was seen today for cough, headache, generalized body aches and other. Diagnoses and all orders for this visit:    Suspected COVID-19 virus infection  -     COVID-19; Future        COVID-19 swab obtained and pending, will call with results once available. Advised strict self-quarantine at home in the interim. Work excuse provided to patient today. Increase fluids and rest. Symptomatic relief discussed including Tylenol prn pain/fever. Schedule f/u with PCP in 7-10 days if symptoms persist. ED sooner if symptoms worsen or change. ED immediately with high or refractory fever, progressive SOB, dyspnea, CP, calf pain/swelling, shaking chills, vomiting, abdominal pain, lethargy, flank pain, or decreased urinary output. Pt verbalizes understanding and is in agreement with plan of care. All questions answered. Rosangela Zamorano, APRN - CNP    This visit was provided as a focused evaluation during the COVID -19 pandemic/national emergency. A comprehensive review of all previous patient history and testing was not conducted. Pertinent findings were elicited during the visit.

## 2021-09-08 NOTE — LETTER
[address] [facility] [place]  [address] [employer], [address]  [place] [address]  Phone: [phone]  Fax: [phone] [facility] [address], NANDA - CNP      9/8/2021     Patient: Victorine Lefort   YOB: 1995       To Whom It May Concern: It is my medical opinion that Victorine Lefort should remain out of work while acutely ill and awaiting COVID-19 test results. Return to work with no retesting should be followed if test is negative AND meets these 3 criteria as outlined by CDC/ODH:   a. No fever without the use of fever reducers for 24 hours  b. Improvement in symptoms  c. At least 3 days since the onset of symptoms     If tests positive for COVID-19, needs minimum of 10 days strict quarantine, improvement of symptoms and 24 hours fever free without fever reducing medications. If you have any questions or concerns, please don't hesitate to call.     Sincerely,        Milo Kebede, NANDA - CNP

## 2021-09-14 LAB
AFB CULTURE (MYCOBACTERIA): NORMAL
AFB SMEAR: NORMAL

## (undated) DEVICE — Device

## (undated) DEVICE — DRESSING COMP W4XL4IN N ADH PD W2.5XL2.5IN GZ BORDERED ADH

## (undated) DEVICE — MARKER,SKIN,WI/RULER AND LABELS: Brand: MEDLINE

## (undated) DEVICE — GLOVE SURG SZ 6 L12IN FNGR THK94MIL TRNSLUC YEL LTX HYDRGEL

## (undated) DEVICE — CHLORAPREP 26ML ORANGE

## (undated) DEVICE — GOWN,SIRUS,FABRNF,XL,20/CS: Brand: MEDLINE

## (undated) DEVICE — BAG SPECIMEN BIOHAZARD 6IN X 9IN

## (undated) DEVICE — COVER HNDL LT DISP

## (undated) DEVICE — CONTAINER VACUTAINER ANAER CULTURE SWAB

## (undated) DEVICE — APPLICATOR PREP 26ML 0.7% IOD POVACRYLEX 74% ISO ALC ST

## (undated) DEVICE — GLOVE ORANGE PI 7   MSG9070

## (undated) DEVICE — PACK PROCEDURE SURG GEN CUST

## (undated) DEVICE — TOWEL,OR,DSP,ST,BLUE,STD,6/PK,12PK/CS: Brand: MEDLINE

## (undated) DEVICE — 4-PORT MANIFOLD: Brand: NEPTUNE 2

## (undated) DEVICE — SOLUTION IV 1000ML 0.9% SOD CHL PH 5 INJ USP VIAFLX PLAS

## (undated) DEVICE — DOUBLE BASIN SET: Brand: MEDLINE INDUSTRIES, INC.

## (undated) DEVICE — DRAIN SURG 15FR SIL RND CHN W/ TRCR FULL FLUT DBL WRP TRAD

## (undated) DEVICE — PUMP SUC IRR TBNG L10FT W/ HNDPC ASSEMB STRYKEFLOW 2

## (undated) DEVICE — SHEET, T, LAPAROTOMY, STERILE: Brand: MEDLINE

## (undated) DEVICE — SOLUTION IV IRRIG POUR BRL 0.9% SODIUM CHL 2F7124